# Patient Record
Sex: FEMALE | Race: WHITE | NOT HISPANIC OR LATINO | Employment: OTHER | URBAN - METROPOLITAN AREA
[De-identification: names, ages, dates, MRNs, and addresses within clinical notes are randomized per-mention and may not be internally consistent; named-entity substitution may affect disease eponyms.]

---

## 2023-03-09 DIAGNOSIS — E03.9 HYPOTHYROIDISM, UNSPECIFIED TYPE: Primary | ICD-10-CM

## 2023-03-09 DIAGNOSIS — I10 PRIMARY HYPERTENSION: ICD-10-CM

## 2023-03-09 DIAGNOSIS — K21.9 GASTROESOPHAGEAL REFLUX DISEASE WITHOUT ESOPHAGITIS: ICD-10-CM

## 2023-03-10 RX ORDER — LISINOPRIL 20 MG/1
TABLET ORAL
Qty: 90 TABLET | Refills: 1 | Status: SHIPPED | OUTPATIENT
Start: 2023-03-10 | End: 2023-03-13 | Stop reason: SDUPTHER

## 2023-03-10 RX ORDER — LEVOTHYROXINE SODIUM 100 UG/1
TABLET ORAL
Qty: 90 TABLET | Refills: 1 | Status: SHIPPED | OUTPATIENT
Start: 2023-03-10 | End: 2023-03-13 | Stop reason: SDUPTHER

## 2023-03-10 RX ORDER — PANTOPRAZOLE SODIUM 40 MG/1
40 TABLET, DELAYED RELEASE ORAL
Qty: 90 TABLET | Refills: 1 | Status: SHIPPED | OUTPATIENT
Start: 2023-03-10 | End: 2023-03-13 | Stop reason: SDUPTHER

## 2023-03-14 RX ORDER — LISINOPRIL 20 MG/1
20 TABLET ORAL DAILY
Qty: 90 TABLET | Refills: 1 | Status: SHIPPED | OUTPATIENT
Start: 2023-03-14 | End: 2024-02-26 | Stop reason: SDUPTHER

## 2023-03-14 RX ORDER — PANTOPRAZOLE SODIUM 40 MG/1
40 TABLET, DELAYED RELEASE ORAL
Qty: 90 TABLET | Refills: 1 | Status: SHIPPED | OUTPATIENT
Start: 2023-03-14 | End: 2023-03-16 | Stop reason: SDUPTHER

## 2023-03-14 RX ORDER — LEVOTHYROXINE SODIUM 100 UG/1
100 TABLET ORAL DAILY
Qty: 90 TABLET | Refills: 1 | Status: SHIPPED | OUTPATIENT
Start: 2023-03-14 | End: 2023-10-06 | Stop reason: SDUPTHER

## 2023-03-16 DIAGNOSIS — K21.9 GASTROESOPHAGEAL REFLUX DISEASE WITHOUT ESOPHAGITIS: ICD-10-CM

## 2023-03-17 RX ORDER — PANTOPRAZOLE SODIUM 40 MG/1
40 TABLET, DELAYED RELEASE ORAL 2 TIMES DAILY
Qty: 180 TABLET | Refills: 1 | Status: SHIPPED | OUTPATIENT
Start: 2023-03-17 | End: 2023-11-29

## 2023-04-23 PROBLEM — R39.9 UTI SYMPTOMS: Status: ACTIVE | Noted: 2023-04-23

## 2023-04-23 PROBLEM — K14.8 TONGUE MASS: Status: ACTIVE | Noted: 2023-04-23

## 2023-04-23 PROBLEM — L03.032 PARONYCHIA OF TOENAIL OF LEFT FOOT: Status: ACTIVE | Noted: 2023-04-23

## 2023-04-23 PROBLEM — F32.A DEPRESSION: Status: ACTIVE | Noted: 2023-04-23

## 2023-04-23 PROBLEM — B18.2 CHRONIC VIRAL HEPATITIS C (MULTI): Status: ACTIVE | Noted: 2023-04-23

## 2023-04-23 PROBLEM — I10 HYPERTENSION: Status: ACTIVE | Noted: 2023-04-23

## 2023-04-23 PROBLEM — R29.3 POSTURE IMBALANCE: Status: ACTIVE | Noted: 2023-04-23

## 2023-04-23 PROBLEM — R13.10 DYSPHAGIA: Status: ACTIVE | Noted: 2023-04-23

## 2023-04-23 PROBLEM — M25.569 JOINT PAIN, KNEE: Status: ACTIVE | Noted: 2023-04-23

## 2023-04-23 PROBLEM — M17.11 ARTHRITIS OF RIGHT KNEE: Status: ACTIVE | Noted: 2023-04-23

## 2023-04-23 PROBLEM — R29.898 WEAKNESS OF BOTH LOWER EXTREMITIES: Status: ACTIVE | Noted: 2023-04-23

## 2023-04-23 PROBLEM — I67.1 BRAIN ANEURYSM (HHS-HCC): Status: ACTIVE | Noted: 2023-04-23

## 2023-04-23 PROBLEM — F10.20 ALCOHOLIC (MULTI): Status: ACTIVE | Noted: 2023-04-23

## 2023-04-23 PROBLEM — Z86.39 HISTORY OF ENLARGEMENT OF PITUITARY GLAND: Status: ACTIVE | Noted: 2023-04-23

## 2023-04-23 PROBLEM — E03.9 HYPOTHYROIDISM: Status: ACTIVE | Noted: 2023-04-23

## 2023-04-23 PROBLEM — A08.4 VIRAL GASTROENTERITIS: Status: ACTIVE | Noted: 2023-04-23

## 2023-04-23 PROBLEM — R29.898 NECK TIGHTNESS: Status: ACTIVE | Noted: 2023-04-23

## 2023-04-23 PROBLEM — M50.10 CERVICAL DISC DISORDER WITH RADICULOPATHY: Status: ACTIVE | Noted: 2023-04-23

## 2023-04-23 PROBLEM — M65.30 TRIGGER FINGER: Status: ACTIVE | Noted: 2023-04-23

## 2023-04-23 PROBLEM — H69.93 EUSTACHIAN TUBE DYSFUNCTION, BILATERAL: Status: ACTIVE | Noted: 2023-04-23

## 2023-04-23 PROBLEM — G45.9 TIA (TRANSIENT ISCHEMIC ATTACK): Status: ACTIVE | Noted: 2023-04-23

## 2023-04-23 PROBLEM — R79.89 ELEVATED LFTS: Status: ACTIVE | Noted: 2023-04-23

## 2023-04-23 PROBLEM — E78.5 HYPERLIPIDEMIA: Status: ACTIVE | Noted: 2023-04-23

## 2023-04-23 PROBLEM — K74.60 CIRRHOSIS (MULTI): Status: ACTIVE | Noted: 2023-04-23

## 2023-04-23 PROBLEM — L25.9 CONTACT DERMATITIS: Status: ACTIVE | Noted: 2023-04-23

## 2023-04-23 PROBLEM — T78.40XA ALLERGY: Status: ACTIVE | Noted: 2023-04-23

## 2023-04-23 PROBLEM — B37.31 VAGINAL CANDIDIASIS: Status: ACTIVE | Noted: 2023-04-23

## 2023-04-23 PROBLEM — E83.42 LOW SERUM MAGNESIUM LEVEL: Status: ACTIVE | Noted: 2023-04-23

## 2023-04-23 PROBLEM — M53.82 WEAKNESS OF NECK: Status: ACTIVE | Noted: 2023-04-23

## 2023-04-23 PROBLEM — J01.90 ACUTE SINUSITIS: Status: ACTIVE | Noted: 2023-04-23

## 2023-04-23 PROBLEM — D22.9 SUSPICIOUS NEVUS: Status: ACTIVE | Noted: 2023-04-23

## 2023-04-23 PROBLEM — Z86.19 HEPATITIS C VIRUS INFECTION CURED AFTER ANTIVIRAL DRUG THERAPY: Status: ACTIVE | Noted: 2023-04-23

## 2023-04-23 PROBLEM — H26.9 CATARACT: Status: ACTIVE | Noted: 2023-04-23

## 2023-04-23 PROBLEM — H44.519 ABSOLUTE GLAUCOMA: Status: ACTIVE | Noted: 2023-04-23

## 2023-04-23 PROBLEM — B37.0 THRUSH: Status: ACTIVE | Noted: 2023-04-23

## 2023-04-23 PROBLEM — E55.9 VITAMIN D DEFICIENCY: Status: ACTIVE | Noted: 2023-04-23

## 2023-04-23 PROBLEM — M54.2 NECK PAIN: Status: ACTIVE | Noted: 2023-04-23

## 2023-04-23 PROBLEM — K74.00 LIVER FIBROSIS: Status: ACTIVE | Noted: 2023-04-23

## 2023-04-23 PROBLEM — M53.3 PAIN, COCCYX: Status: ACTIVE | Noted: 2023-04-23

## 2023-04-23 PROBLEM — R55 SYNCOPE AND COLLAPSE: Status: ACTIVE | Noted: 2023-04-23

## 2023-04-23 PROBLEM — K14.6 TONGUE PAIN: Status: ACTIVE | Noted: 2023-04-23

## 2023-04-23 PROBLEM — N39.0 ACUTE UTI: Status: ACTIVE | Noted: 2023-04-23

## 2023-04-23 PROBLEM — M26.649 TMJ ARTHRITIS: Status: ACTIVE | Noted: 2023-04-23

## 2023-04-23 PROBLEM — H69.91 DYSFUNCTION OF RIGHT EUSTACHIAN TUBE: Status: ACTIVE | Noted: 2023-04-23

## 2023-04-23 PROBLEM — K59.09 CONSTIPATION, CHRONIC: Status: ACTIVE | Noted: 2023-04-23

## 2023-04-23 PROBLEM — B02.9 SHINGLES: Status: ACTIVE | Noted: 2023-04-23

## 2023-04-23 PROBLEM — K13.21 ORAL LEUKOPLAKIA: Status: ACTIVE | Noted: 2023-04-23

## 2023-04-23 PROBLEM — S41.109A: Status: ACTIVE | Noted: 2023-04-23

## 2023-04-23 PROBLEM — E23.6 PITUITARY CYST (MULTI): Status: ACTIVE | Noted: 2023-04-23

## 2023-04-23 PROBLEM — R73.01 IMPAIRED FASTING GLUCOSE: Status: ACTIVE | Noted: 2023-04-23

## 2023-04-23 PROBLEM — E87.1 HYPONATREMIA: Status: ACTIVE | Noted: 2023-04-23

## 2023-04-23 PROBLEM — R68.3 CLUBBING OF FINGERS: Status: ACTIVE | Noted: 2023-04-23

## 2023-04-23 PROBLEM — I88.9 LYMPHADENITIS: Status: ACTIVE | Noted: 2023-04-23

## 2023-04-23 PROBLEM — J02.9 SORE THROAT: Status: ACTIVE | Noted: 2023-04-23

## 2023-04-23 PROBLEM — R76.8 ANTI-TPO ANTIBODIES PRESENT: Status: ACTIVE | Noted: 2023-04-23

## 2023-04-23 RX ORDER — IBUPROFEN 600 MG/1
600 TABLET ORAL EVERY 6 HOURS
COMMUNITY
Start: 2023-02-11 | End: 2023-11-02 | Stop reason: ALTCHOICE

## 2023-04-23 RX ORDER — OLOPATADINE HYDROCHLORIDE 2 MG/ML
SOLUTION/ DROPS OPHTHALMIC
COMMUNITY
Start: 2021-11-11

## 2023-04-23 RX ORDER — TIZANIDINE 2 MG/1
TABLET ORAL
COMMUNITY
Start: 2023-01-02 | End: 2023-11-02 | Stop reason: ALTCHOICE

## 2023-04-23 RX ORDER — METHOCARBAMOL 750 MG/1
1 TABLET, FILM COATED ORAL
COMMUNITY
Start: 2023-02-11 | End: 2023-11-02 | Stop reason: ALTCHOICE

## 2023-04-23 RX ORDER — ASPIRIN 81 MG/1
1 TABLET ORAL DAILY
COMMUNITY
Start: 2018-06-26

## 2023-04-23 RX ORDER — ACETAMINOPHEN 500 MG
500 TABLET ORAL EVERY 6 HOURS
COMMUNITY
Start: 2023-02-11

## 2023-04-23 RX ORDER — SIMVASTATIN 10 MG/1
10 TABLET, FILM COATED ORAL NIGHTLY
COMMUNITY
Start: 2023-01-20 | End: 2023-04-24 | Stop reason: ALTCHOICE

## 2023-04-23 RX ORDER — FOLIC ACID 1 MG/1
1 TABLET ORAL DAILY
COMMUNITY

## 2023-04-23 RX ORDER — CALCIUM CARBONATE 600 MG
1200 TABLET ORAL
COMMUNITY

## 2023-04-23 RX ORDER — VIT C/E/ZN/COPPR/LUTEIN/ZEAXAN 250MG-90MG
50 CAPSULE ORAL
COMMUNITY
Start: 2021-12-23

## 2023-04-23 RX ORDER — ATORVASTATIN CALCIUM 40 MG/1
1 TABLET, FILM COATED ORAL NIGHTLY
COMMUNITY
Start: 2023-01-23 | End: 2023-07-17 | Stop reason: SDUPTHER

## 2023-04-23 RX ORDER — CLOPIDOGREL BISULFATE 75 MG/1
1 TABLET ORAL DAILY
COMMUNITY
Start: 2023-01-13 | End: 2024-03-20 | Stop reason: ALTCHOICE

## 2023-04-23 RX ORDER — GLUCOSAMINE/MSM/CHONDROIT SULF 500-166.6
1 TABLET ORAL DAILY
COMMUNITY
Start: 2021-11-11

## 2023-04-23 RX ORDER — VITAMIN B COMPLEX
1 CAPSULE ORAL DAILY
COMMUNITY
Start: 2021-11-11

## 2023-04-24 ENCOUNTER — OFFICE VISIT (OUTPATIENT)
Dept: PRIMARY CARE | Facility: CLINIC | Age: 68
End: 2023-04-24
Payer: MEDICARE

## 2023-04-24 VITALS
SYSTOLIC BLOOD PRESSURE: 137 MMHG | DIASTOLIC BLOOD PRESSURE: 67 MMHG | HEART RATE: 73 BPM | WEIGHT: 149 LBS | BODY MASS INDEX: 26.4 KG/M2 | HEIGHT: 63 IN

## 2023-04-24 DIAGNOSIS — E78.5 HYPERLIPIDEMIA, UNSPECIFIED HYPERLIPIDEMIA TYPE: Primary | ICD-10-CM

## 2023-04-24 DIAGNOSIS — Z12.31 ENCOUNTER FOR SCREENING MAMMOGRAM FOR MALIGNANT NEOPLASM OF BREAST: ICD-10-CM

## 2023-04-24 DIAGNOSIS — E03.9 HYPOTHYROIDISM, UNSPECIFIED TYPE: ICD-10-CM

## 2023-04-24 DIAGNOSIS — G45.9 TIA (TRANSIENT ISCHEMIC ATTACK): ICD-10-CM

## 2023-04-24 DIAGNOSIS — F17.210 CIGARETTE NICOTINE DEPENDENCE WITHOUT COMPLICATION: ICD-10-CM

## 2023-04-24 DIAGNOSIS — F10.20 ALCOHOLIC (MULTI): ICD-10-CM

## 2023-04-24 PROCEDURE — 1159F MED LIST DOCD IN RCRD: CPT | Performed by: INTERNAL MEDICINE

## 2023-04-24 PROCEDURE — 1160F RVW MEDS BY RX/DR IN RCRD: CPT | Performed by: INTERNAL MEDICINE

## 2023-04-24 PROCEDURE — 3075F SYST BP GE 130 - 139MM HG: CPT | Performed by: INTERNAL MEDICINE

## 2023-04-24 PROCEDURE — 99214 OFFICE O/P EST MOD 30 MIN: CPT | Performed by: INTERNAL MEDICINE

## 2023-04-24 PROCEDURE — 4004F PT TOBACCO SCREEN RCVD TLK: CPT | Performed by: INTERNAL MEDICINE

## 2023-04-24 PROCEDURE — 3078F DIAST BP <80 MM HG: CPT | Performed by: INTERNAL MEDICINE

## 2023-04-24 ASSESSMENT — PATIENT HEALTH QUESTIONNAIRE - PHQ9
1. LITTLE INTEREST OR PLEASURE IN DOING THINGS: NOT AT ALL
SUM OF ALL RESPONSES TO PHQ9 QUESTIONS 1 AND 2: 0
2. FEELING DOWN, DEPRESSED OR HOPELESS: NOT AT ALL

## 2023-04-24 NOTE — PATIENT INSTRUCTIONS
Mamm and CT Chest [phone]     Please complete fasting blood work. Fast for 10 hours, black coffee and water the morning of labs are OK.     Think about getting shingle vaccine     Come back in 6 months

## 2023-04-24 NOTE — PROGRESS NOTES
"Subjective   Patient ID: Luz Castillo is a 67 y.o. female who presents for 6 month follow up.    HPI     Following with Dr. Issa for cervical radiculopathy. Had recent MRI, will discuss results with Dr. Issa    Following with Dr. Logan Gee for low back pain with LLE radiculopathy, started on prednisone last week which did help with her back pain.     Noticed a mass on her right thumb about 2 months ago. Not getting bigger. Follows with Dr. NOEL Singh for trigger finger     Reports b/l LE edema cramps started a few months ago. Uses mustard and tonic water that does help.      Review of Systems   All other systems reviewed and are negative.      Objective   /67   Pulse 73   Ht 1.6 m (5' 3\")   Wt 89.4 kg (197 lb)   BMI 34.90 kg/m²     Physical Exam  Constitutional:       Appearance: Normal appearance.   HENT:      Head: Normocephalic and atraumatic.   Cardiovascular:      Rate and Rhythm: Normal rate and regular rhythm.      Heart sounds: Normal heart sounds. No murmur heard.     No gallop.   Pulmonary:      Effort: Pulmonary effort is normal. No respiratory distress.      Breath sounds: No wheezing or rales.   Skin:     General: Skin is warm and dry.      Findings: No rash.   Neurological:      Mental Status: She is alert and oriented to person, place, and time. Mental status is at baseline.   Psychiatric:         Mood and Affect: Mood normal.         Behavior: Behavior normal.         Assessment/Plan       #HLD, TIA   -No new neuro symptoms. Cont ASA and clopidogrel  -Cont atorvastatin 40mg daily      #Cervical radiculopathy and low back pain   -Follows with Dr. Issa and Dr. Logan Gee     #Tongue lesion  -bx benign, close f/u with Dr. Gallardo      #Hypothyroidism, multinodular goiter   -Continue levothyroxine, check TSH      #Hepatitis C,  -s/p Mavyret, Following with hepatology      #HTN  -Well controlled, cont lisinopril      #GERD  -cont PPI      #Polysubstance abuse, in remission   -Cont 12 step " meetings and outpatient treatment        Influenza: Declines   COVID: x2  Prevnar 13: Never   Pneumovax 23: 9/20/21  Shingrix: Never  Mamm: 2/7/22--order today   DEXA: 3/24/22  Pap: Never  Colorectal ca: 10/5/22- 5 years   Lung ca screening: never--ordered      RTC 6 mo

## 2023-04-26 ENCOUNTER — LAB (OUTPATIENT)
Dept: LAB | Facility: LAB | Age: 68
End: 2023-04-26
Payer: MEDICARE

## 2023-04-26 DIAGNOSIS — G45.9 TIA (TRANSIENT ISCHEMIC ATTACK): ICD-10-CM

## 2023-04-26 DIAGNOSIS — E03.9 HYPOTHYROIDISM, UNSPECIFIED TYPE: ICD-10-CM

## 2023-04-26 DIAGNOSIS — E78.5 HYPERLIPIDEMIA, UNSPECIFIED HYPERLIPIDEMIA TYPE: ICD-10-CM

## 2023-04-26 LAB
CHOLESTEROL (MG/DL) IN SER/PLAS: 150 MG/DL (ref 0–199)
CHOLESTEROL IN HDL (MG/DL) IN SER/PLAS: 66.3 MG/DL
CHOLESTEROL/HDL RATIO: 2.3
LDL: 67 MG/DL (ref 0–99)
THYROTROPIN (MIU/L) IN SER/PLAS BY DETECTION LIMIT <= 0.05 MIU/L: 2.56 MIU/L (ref 0.44–3.98)
TRIGLYCERIDE (MG/DL) IN SER/PLAS: 82 MG/DL (ref 0–149)
VLDL: 16 MG/DL (ref 0–40)

## 2023-04-26 PROCEDURE — 80061 LIPID PANEL: CPT

## 2023-04-26 PROCEDURE — 84443 ASSAY THYROID STIM HORMONE: CPT

## 2023-04-26 PROCEDURE — 36415 COLL VENOUS BLD VENIPUNCTURE: CPT

## 2023-07-17 DIAGNOSIS — E78.5 HYPERLIPIDEMIA, UNSPECIFIED HYPERLIPIDEMIA TYPE: Primary | ICD-10-CM

## 2023-07-19 RX ORDER — ATORVASTATIN CALCIUM 40 MG/1
40 TABLET, FILM COATED ORAL NIGHTLY
Qty: 90 TABLET | Refills: 0 | Status: SHIPPED | OUTPATIENT
Start: 2023-07-19 | End: 2023-10-16 | Stop reason: SDUPTHER

## 2023-09-22 RX ORDER — HYDROCODONE BITARTRATE AND ACETAMINOPHEN 5; 325 MG/1; MG/1
1 TABLET ORAL EVERY 6 HOURS PRN
COMMUNITY
End: 2023-11-02 | Stop reason: ALTCHOICE

## 2023-09-22 RX ORDER — METHYLPREDNISOLONE 4 MG/1
TABLET ORAL
COMMUNITY
Start: 2023-04-19 | End: 2023-11-02 | Stop reason: ALTCHOICE

## 2023-09-22 RX ORDER — MAGNESIUM GLYCINATE 100 MG
CAPSULE ORAL
COMMUNITY
Start: 2023-08-08

## 2023-09-22 RX ORDER — AMOXICILLIN 500 MG/1
CAPSULE ORAL
COMMUNITY
Start: 2022-12-29 | End: 2024-03-21 | Stop reason: SDUPTHER

## 2023-09-22 RX ORDER — CYCLOBENZAPRINE HCL 10 MG
10 TABLET ORAL EVERY 8 HOURS PRN
COMMUNITY
Start: 2017-03-25 | End: 2023-11-02 | Stop reason: ALTCHOICE

## 2023-09-22 RX ORDER — OXYCODONE AND ACETAMINOPHEN 5; 325 MG/1; MG/1
1 TABLET ORAL EVERY 4 HOURS PRN
COMMUNITY
Start: 2017-03-25 | End: 2023-11-02 | Stop reason: ALTCHOICE

## 2023-10-02 ENCOUNTER — ANCILLARY PROCEDURE (OUTPATIENT)
Dept: RADIOLOGY | Facility: CLINIC | Age: 68
End: 2023-10-02
Payer: MEDICARE

## 2023-10-02 ENCOUNTER — HOSPITAL ENCOUNTER (OUTPATIENT)
Dept: GASTROENTEROLOGY | Facility: CLINIC | Age: 68
Discharge: HOME | End: 2023-10-02
Payer: MEDICARE

## 2023-10-02 ENCOUNTER — LAB (OUTPATIENT)
Dept: LAB | Facility: LAB | Age: 68
End: 2023-10-02
Payer: MEDICARE

## 2023-10-02 DIAGNOSIS — K74.60 HEPATIC CIRRHOSIS, UNSPECIFIED HEPATIC CIRRHOSIS TYPE, UNSPECIFIED WHETHER ASCITES PRESENT (MULTI): ICD-10-CM

## 2023-10-02 DIAGNOSIS — Z11.59 ENCOUNTER FOR SCREENING FOR OTHER VIRAL DISEASES: ICD-10-CM

## 2023-10-02 DIAGNOSIS — K74.00 LIVER FIBROSIS: ICD-10-CM

## 2023-10-02 DIAGNOSIS — K74.00 HEPATIC FIBROSIS, UNSPECIFIED: ICD-10-CM

## 2023-10-02 DIAGNOSIS — K74.60 UNSPECIFIED CIRRHOSIS OF LIVER (MULTI): ICD-10-CM

## 2023-10-02 DIAGNOSIS — B18.2 CHRONIC VIRAL HEPATITIS C (MULTI): ICD-10-CM

## 2023-10-02 DIAGNOSIS — B18.2 CHRONIC HEPATITIS C WITH HEPATIC COMA (MULTI): ICD-10-CM

## 2023-10-02 DIAGNOSIS — B18.2 CHRONIC VIRAL HEPATITIS C (MULTI): Primary | ICD-10-CM

## 2023-10-02 LAB
ALBUMIN SERPL BCP-MCNC: 4.5 G/DL (ref 3.4–5)
ALP SERPL-CCNC: 47 U/L (ref 33–136)
ALT SERPL W P-5'-P-CCNC: 17 U/L (ref 7–45)
AST SERPL W P-5'-P-CCNC: 15 U/L (ref 9–39)
BILIRUB DIRECT SERPL-MCNC: 0.1 MG/DL (ref 0–0.3)
BILIRUB SERPL-MCNC: 0.3 MG/DL (ref 0–1.2)
HBV SURFACE AB SER-ACNC: 5.5 MIU/ML
PROT SERPL-MCNC: 7.1 G/DL (ref 6.4–8.2)

## 2023-10-02 PROCEDURE — 76705 ECHO EXAM OF ABDOMEN: CPT | Performed by: RADIOLOGY

## 2023-10-02 PROCEDURE — 91200 LIVER ELASTOGRAPHY: CPT | Performed by: INTERNAL MEDICINE

## 2023-10-02 PROCEDURE — 36415 COLL VENOUS BLD VENIPUNCTURE: CPT

## 2023-10-02 PROCEDURE — 80076 HEPATIC FUNCTION PANEL: CPT

## 2023-10-02 PROCEDURE — 76705 ECHO EXAM OF ABDOMEN: CPT

## 2023-10-02 PROCEDURE — 86706 HEP B SURFACE ANTIBODY: CPT

## 2023-10-02 PROCEDURE — 87522 HEPATITIS C REVRS TRNSCRPJ: CPT

## 2023-10-03 LAB
HCV RNA SERPL NAA+PROBE-ACNC: NOT DETECTED K[IU]/ML
HCV RNA SERPL NAA+PROBE-LOG IU: NORMAL {LOG_IU}/ML

## 2023-10-04 NOTE — PROCEDURES
1045)  Patient referred for Fibroscan study for diagnosis of Chronic Viral Hepatitis              Patient identified x 2 and verified the following:                 Has been NPO for at least 3 hours.                 Age 18 or older - yes                 If female, not pregnant - yes                                Fibroscan study completed using XL probe and 10 consecutive valid measurements obtained.             Patient tolerated procedure well.               RESULTS:    Median   =    4.0 kPa                                       IQR/med =  11  %                                 CAP       =  269   dB/m                Patient informed that Dr. AMRIK Isabel will read the study and contact her with the results.                   Chata Hernandez RN-BC    Attending Note:  This was a technically adequate study. The Fibrosis score is consistent with Metavir F0 . The CAP score is consistent with 5-33% hepatocyte steatosis (stage 1 of 3 steatosis).    Tristan Isabel MD   Hepatology

## 2023-10-06 DIAGNOSIS — E03.9 HYPOTHYROIDISM, UNSPECIFIED TYPE: ICD-10-CM

## 2023-10-06 NOTE — TELEPHONE ENCOUNTER
Requested Prescriptions     Pending Prescriptions Disp Refills    levothyroxine (Synthroid, Levoxyl) 100 mcg tablet 90 tablet 1     Sig: Take 1 tablet (100 mcg) by mouth once daily.

## 2023-10-09 RX ORDER — LEVOTHYROXINE SODIUM 100 UG/1
100 TABLET ORAL DAILY
Qty: 90 TABLET | Refills: 1 | Status: SHIPPED | OUTPATIENT
Start: 2023-10-09 | End: 2024-03-20 | Stop reason: SDUPTHER

## 2023-10-16 ENCOUNTER — TELEPHONE (OUTPATIENT)
Dept: PRIMARY CARE | Facility: CLINIC | Age: 68
End: 2023-10-16
Payer: MEDICARE

## 2023-10-16 DIAGNOSIS — E78.5 HYPERLIPIDEMIA, UNSPECIFIED HYPERLIPIDEMIA TYPE: ICD-10-CM

## 2023-10-16 RX ORDER — ATORVASTATIN CALCIUM 40 MG/1
40 TABLET, FILM COATED ORAL NIGHTLY
Qty: 90 TABLET | Refills: 1 | Status: SHIPPED | OUTPATIENT
Start: 2023-10-16 | End: 2024-04-17 | Stop reason: SDUPTHER

## 2023-10-16 NOTE — TELEPHONE ENCOUNTER
Requested Prescriptions     Pending Prescriptions Disp Refills    atorvastatin (Lipitor) 40 mg tablet 90 tablet 1     Sig: Take 1 tablet (40 mg) by mouth once daily at bedtime.

## 2023-10-25 ENCOUNTER — APPOINTMENT (OUTPATIENT)
Dept: PRIMARY CARE | Facility: CLINIC | Age: 68
End: 2023-10-25
Payer: MEDICARE

## 2023-10-26 ENCOUNTER — OFFICE VISIT (OUTPATIENT)
Dept: GASTROENTEROLOGY | Facility: CLINIC | Age: 68
End: 2023-10-26
Payer: MEDICARE

## 2023-10-26 VITALS
BODY MASS INDEX: 26.19 KG/M2 | DIASTOLIC BLOOD PRESSURE: 66 MMHG | SYSTOLIC BLOOD PRESSURE: 107 MMHG | HEIGHT: 63 IN | HEART RATE: 76 BPM | WEIGHT: 147.8 LBS | TEMPERATURE: 97.3 F

## 2023-10-26 DIAGNOSIS — Z71.6 ENCOUNTER FOR SMOKING CESSATION COUNSELING: ICD-10-CM

## 2023-10-26 DIAGNOSIS — K74.00 LIVER FIBROSIS: ICD-10-CM

## 2023-10-26 DIAGNOSIS — Z86.19 HEPATITIS C VIRUS INFECTION CURED AFTER ANTIVIRAL DRUG THERAPY: Primary | ICD-10-CM

## 2023-10-26 DIAGNOSIS — Z71.2 ENCOUNTER TO DISCUSS TEST RESULTS: ICD-10-CM

## 2023-10-26 PROCEDURE — 99406 BEHAV CHNG SMOKING 3-10 MIN: CPT | Performed by: INTERNAL MEDICINE

## 2023-10-26 PROCEDURE — 99214 OFFICE O/P EST MOD 30 MIN: CPT | Performed by: INTERNAL MEDICINE

## 2023-10-26 PROCEDURE — 1126F AMNT PAIN NOTED NONE PRSNT: CPT | Performed by: INTERNAL MEDICINE

## 2023-10-26 PROCEDURE — 1160F RVW MEDS BY RX/DR IN RCRD: CPT | Performed by: INTERNAL MEDICINE

## 2023-10-26 PROCEDURE — 4004F PT TOBACCO SCREEN RCVD TLK: CPT | Performed by: INTERNAL MEDICINE

## 2023-10-26 PROCEDURE — 1159F MED LIST DOCD IN RCRD: CPT | Performed by: INTERNAL MEDICINE

## 2023-10-26 PROCEDURE — 3078F DIAST BP <80 MM HG: CPT | Performed by: INTERNAL MEDICINE

## 2023-10-26 PROCEDURE — 3074F SYST BP LT 130 MM HG: CPT | Performed by: INTERNAL MEDICINE

## 2023-10-26 ASSESSMENT — PAIN SCALES - GENERAL: PAINLEVEL: 0-NO PAIN

## 2023-10-26 NOTE — PATIENT INSTRUCTIONS
"Welcome to Dr. [facility].  Dr. Isabel sees patients at the following sites:  [place] [address] [facility] at [facility], [address] at [facility], [facility] [address]    Dr. Isabel's hepatology care coordinator, Pamela EDMONDS, can be reached at [phone].  Dr. Isabel's , Ashley Ovalles, can be reached at [phone].     Get a Liver Ultrasound in 1 year.  Do not eat or drink anything 6 hours prior to your exam.  Call [phone] to schedule.    Get a FibroSCAN of your liver in 1 year.   Do not eat or drink anything 3 hours prior to your exam.  Schedule on your way out today, or call [phone]. When prompted, stated \"make an appointment\" and then \"gastro.\"     Get blood work in 1 year.     See me back in clinic in 1 year. Please make sure all testing is completed prior to your follow up visit.  Please call around April 2024 to schedule your follow up visit.  "

## 2023-10-26 NOTE — PROGRESS NOTES
Subjective     Reason for visit: follow up for chronic HCV infection and advanced fibrosis.    History of Present Illness:   Luz Castillo is a 68 y.o. female who presents to GI/Liver clinic for follow up of chronic Hepatitis C infection, advanced fibrosis.    Pt is a 68/F with PMH of chronic HCV infection, alcohol dependence in remission x > 2 years, hypothyroidism, HLD, HTN, cocaine dependence in remission who is here for follow up of chronic Hep C.     Pt got infected with Hep C in 90s from IV drug use.   She had GT 1a, had VL 2.48 million IU/mL,   Completed Mavyret x 8 weeks in March 2022 without missed doses, achieved SVR, neg HCV at 3 month and 6 months.     Baseline F3 fibrosis (10.6 kPa) on Fibroscan before treatment which improved to F0-1 following SVR in Sept 2022.  Repeat Fibroscan (10/2/2023): LSM 4.0 /     Pt has been sober from alcohol and quit cocaine since March 2021.   Smokes a pack of cigarettes a day, ~5 pack years in the last 5 years since care home.   Prior to that, she smoked 0.5 pack for 45 years.  Continue to smoke cigarettes.    Today, the pt denies any GI symptoms.     Labs:  AST and ALT elevation ~ 50s in 2021, normalized following treatment. Normal bilirubin, INR and platelets    Imaging:  Fibroscan S0, F0-1 3.7 kPa in Sept 2022  US liver Oct 2022: normal liver, no ascites  Proc  EGD 2018:   Erythematous duodenopathy, biopsy normal  Gastritis, no H pylori  Normal appearing esophagus for dysphagia, neg biopsy     Colonoscopy Oct 2022 (with Dr. Mejía): 10 mm SSA, 5 year follow up.    Continue to smoke, has discussed with me and her other physicians (Dr Driscoll, Cardiology)  2 + years of sobriety from alcohol use. Will be 3 years in March 2024.    Review of Systems  Review of Systems   Constitutional: Negative.    HENT: Negative.     Eyes: Negative.    Cardiovascular: Negative.    Gastrointestinal: Negative.    Endocrine: Negative.    Genitourinary: Negative.    All other  systems reviewed and are negative.      Past Medical History   has a past medical history of Alcohol dependence, in remission (CMS/HCC) (12/26/2019), Alcohol dependence, in remission (CMS/HCC) (09/20/2021), Anxiety disorder, unspecified, Chronic viral hepatitis C (CMS/HCC) (11/29/2021), Other long term (current) drug therapy (05/06/2021), Personal history of diseases of the blood and blood-forming organs and certain disorders involving the immune mechanism, Personal history of other diseases of the circulatory system, Personal history of other diseases of the digestive system (09/20/2021), Personal history of other diseases of the female genital tract, Personal history of other endocrine, nutritional and metabolic disease (03/10/2021), and Personal history of other infectious and parasitic diseases (03/17/2022).     Social History   reports that she has been smoking cigarettes. She has been smoking an average of 1 pack per day. She has never used smokeless tobacco. She reports that she does not drink alcohol and does not use drugs.     Family History  family history is not on file.     Allergies  Allergies   Allergen Reactions    Atorvastatin Hives and Unknown    Celecoxib Unknown     Comments: heart races    Diphenhydramine Unknown    Iodine Itching and Swelling    Pramipexole Unknown    Rofecoxib Other     tacjycardia       Medications  Current Outpatient Medications   Medication Instructions    acetaminophen (TYLENOL) 500 mg, oral, Every 6 hours    amoxicillin (Amoxil) 500 mg capsule oral    aspirin 81 mg EC tablet 1 tablet, oral, Daily    atorvastatin (LIPITOR) 40 mg, oral, Nightly    b complex vitamins capsule 1 capsule, oral, Daily    calcium carbonate 600 mg calcium (1,500 mg) tablet oral    cholecalciferol (VITAMIN D-3) 50 mcg, oral    clopidogrel (Plavix) 75 mg tablet 1 tablet, oral, Daily    cyclobenzaprine (FLEXERIL) 10 mg, oral, Every 8 hours PRN    folic acid (Folvite) 1 mg tablet 1 tablet, oral, Daily     HYDROcodone-acetaminophen (Norco) 5-325 mg tablet 1 tablet, oral, Every 6 hours PRN     mg, oral, Every 6 hours    levothyroxine (SYNTHROID, LEVOXYL) 100 mcg, oral, Daily    lisinopril 20 mg, oral, Daily    magnesium glycinate 100 mg magnesium capsule oral    methocarbamol (Robaxin) 750 mg tablet 1 tablet, oral, Every 6 hours during day    methylPREDNISolone (Medrol Dospak) 4 mg tablets TAKE AS DIRECTED PER PACKAGE    multivit-min/ferrous gluconate (CENTAMIN ORAL) oral    olopatadine (Pataday) 0.2 % ophthalmic solution ophthalmic (eye), Daily RT    omega 3-dha-epa-fish oil 360 mg-108 mg- 180 mg-1,200 mg capsule 1 capsule, oral, Daily    oxyCODONE-acetaminophen (Percocet) 5-325 mg tablet 1 tablet, oral, Every 4 hours PRN    pantoprazole (PROTONIX) 40 mg, oral, 2 times daily, take before breakfast and dinner    tiZANidine (Zanaflex) 2 mg tablet oral        Objective   Visit Vitals  /66   Pulse 76   Temp 36.3 °C (97.3 °F) (Temporal)      Physical Exam  Vitals and nursing note reviewed.   Constitutional:       Appearance: Normal appearance.   HENT:      Head: Normocephalic and atraumatic.      Mouth/Throat:      Mouth: Mucous membranes are moist.      Pharynx: Oropharynx is clear.   Eyes:      General: No scleral icterus.     Extraocular Movements: Extraocular movements intact.      Pupils: Pupils are equal, round, and reactive to light.   Cardiovascular:      Rate and Rhythm: Normal rate and regular rhythm.      Heart sounds: No murmur heard.  Pulmonary:      Effort: Pulmonary effort is normal.      Breath sounds: Normal breath sounds.   Abdominal:      General: Abdomen is flat. Bowel sounds are normal.      Palpations: Abdomen is soft.   Musculoskeletal:         General: No swelling. Normal range of motion.      Right lower leg: No edema.      Left lower leg: No edema.   Skin:     Coloration: Skin is not jaundiced.   Neurological:      General: No focal deficit present.      Mental Status: She is alert  "and oriented to person, place, and time. Mental status is at baseline.   Psychiatric:         Mood and Affect: Mood normal.         Thought Content: Thought content normal.         Judgment: Judgment normal.     Labs            No lab exists for component: \"LABALBU\"        Fibroscan 10/2/23             RESULTS:    Median   =    4.0 kPa                                       IQR/med =  11  %                                 CAP       =  269   dB/m    Liver US 10/2/23  FINDINGS:   LIVER:   The liver is homogeneous in echotexture. No focal hepatic lesion is   identified.  Liver measures 13.2 cm in sagittal dimension.       GALLBLADDER:   Gallbladder contains a round hyperechoic non mobile nonshadowing 3 mm   polyp which is likely benign, similar to prior. No shadowing   gallstones are seen. No gallbladder wall thickening. Sonographic   Reveles sign is negative.     Assessment/Plan   Luz Castillo is a 68 y.o. female who presents to GI / Liver clinic for follow up of chronic HCV infection (cured), advanced fibrosis.    68/ F with chronic Hep C, genotype 1a, s.p Mavyret x 8 weeks, completed 03/2022 with SVR.   She continues to remain sober and abstain from IVDU however continues to smoke.    It was explained to the patient that her Hep C antibody test will stay positive but a negative HCV RNA means her infection is cured.     Fibrosis improved from F3 to F0-1 within a year: pre-treatment fibrosis score/liver stiffness driven by inflammation? Major relevant question revolves around her future liver cancer risk, and the need for ongoing HCC screening and surveillance.     She had remote vaccination to Hep B. Pt has immunity to Hep A but not to Hep B.       Plan   - Counseled re:  smoking cessation   - Recommend booster for Hep B now and recheck HbsAb next year: HB S Ab level is low, can repeat the seriew.   - Follow up with hepatic panel, liver US and fibroscan next year, Sept/Oct 2024.  - Colonoscopy due in 2025 for a " 10 mm SSA     Instructions      Tristan Isabel MD

## 2023-10-29 ASSESSMENT — ENCOUNTER SYMPTOMS
ENDOCRINE NEGATIVE: 1
CONSTITUTIONAL NEGATIVE: 1
EYES NEGATIVE: 1
GASTROINTESTINAL NEGATIVE: 1
CARDIOVASCULAR NEGATIVE: 1

## 2023-11-02 ENCOUNTER — OFFICE VISIT (OUTPATIENT)
Dept: PRIMARY CARE | Facility: CLINIC | Age: 68
End: 2023-11-02
Payer: MEDICARE

## 2023-11-02 VITALS
OXYGEN SATURATION: 97 % | DIASTOLIC BLOOD PRESSURE: 71 MMHG | SYSTOLIC BLOOD PRESSURE: 136 MMHG | HEART RATE: 81 BPM | WEIGHT: 148 LBS | BODY MASS INDEX: 26.22 KG/M2

## 2023-11-02 DIAGNOSIS — Z00.00 ROUTINE GENERAL MEDICAL EXAMINATION AT HEALTH CARE FACILITY: Primary | ICD-10-CM

## 2023-11-02 DIAGNOSIS — E03.9 HYPOTHYROIDISM, UNSPECIFIED TYPE: ICD-10-CM

## 2023-11-02 DIAGNOSIS — E78.5 HYPERLIPIDEMIA, UNSPECIFIED HYPERLIPIDEMIA TYPE: ICD-10-CM

## 2023-11-02 DIAGNOSIS — I10 HYPERTENSION, UNSPECIFIED TYPE: ICD-10-CM

## 2023-11-02 PROCEDURE — 1159F MED LIST DOCD IN RCRD: CPT | Performed by: INTERNAL MEDICINE

## 2023-11-02 PROCEDURE — 3078F DIAST BP <80 MM HG: CPT | Performed by: INTERNAL MEDICINE

## 2023-11-02 PROCEDURE — 1160F RVW MEDS BY RX/DR IN RCRD: CPT | Performed by: INTERNAL MEDICINE

## 2023-11-02 PROCEDURE — 99213 OFFICE O/P EST LOW 20 MIN: CPT | Performed by: INTERNAL MEDICINE

## 2023-11-02 PROCEDURE — 3075F SYST BP GE 130 - 139MM HG: CPT | Performed by: INTERNAL MEDICINE

## 2023-11-02 PROCEDURE — 1170F FXNL STATUS ASSESSED: CPT | Performed by: INTERNAL MEDICINE

## 2023-11-02 PROCEDURE — G0439 PPPS, SUBSEQ VISIT: HCPCS | Performed by: INTERNAL MEDICINE

## 2023-11-02 PROCEDURE — 1126F AMNT PAIN NOTED NONE PRSNT: CPT | Performed by: INTERNAL MEDICINE

## 2023-11-02 PROCEDURE — 4004F PT TOBACCO SCREEN RCVD TLK: CPT | Performed by: INTERNAL MEDICINE

## 2023-11-02 ASSESSMENT — ACTIVITIES OF DAILY LIVING (ADL)
DOING_HOUSEWORK: INDEPENDENT
DRESSING: INDEPENDENT
TAKING_MEDICATION: INDEPENDENT
BATHING: INDEPENDENT
MANAGING_FINANCES: INDEPENDENT
GROCERY_SHOPPING: INDEPENDENT

## 2023-11-02 ASSESSMENT — PATIENT HEALTH QUESTIONNAIRE - PHQ9
SUM OF ALL RESPONSES TO PHQ9 QUESTIONS 1 AND 2: 0
1. LITTLE INTEREST OR PLEASURE IN DOING THINGS: NOT AT ALL
2. FEELING DOWN, DEPRESSED OR HOPELESS: NOT AT ALL

## 2023-11-02 NOTE — PROGRESS NOTES
Subjective   Reason for Visit: Luz Castillo is an 68 y.o. female here for a Medicare Wellness visit.               HPI    Patient Care Team:  Mark Higuera DO as PCP - General (Internal Medicine)  Mark Higuera DO as PCP - [employer] PCP     Review of Systems    Objective   Vitals:  /71   Pulse 81   Wt 67.1 kg (148 lb)   SpO2 97%   BMI 26.22 kg/m²       Physical Exam    Assessment/Plan   Problem List Items Addressed This Visit     Hyperlipidemia    Relevant Orders    Lipid panel    Hypertension - Primary    Relevant Orders    CBC    Comprehensive metabolic panel    Hypothyroidism    Relevant Orders    TSH   Other Visit Diagnoses     Routine general medical examination at health care facility

## 2023-11-02 NOTE — PATIENT INSTRUCTIONS
COVID and RSV vaccines  Recommend over the counter melatonin  Get your fasting blood work done  Return to clinic in 6 months with fasting blood work done a few days prior

## 2023-11-02 NOTE — PROGRESS NOTES
Subjective   Patient ID: Luz Castillo is a 68 y.o. female who presents for Medicare Annual Wellness Visit Subsequent.    HPI   -trigger finger bothering her, right hand, constant ache  -Scheduled to see Dr. Singh on 11/15 about her trigger finger  -he has told her that she cannot get anymore injections and will have to consider surgery as a next step    -saw GI for constipation  -sometimes takes miralax, doculax if that doesn't work, but she dislikes how it makes her feel  -taking 1200iu of calcium per day    -back pain has improved, but notes that she hasn't been keeping up with her home PT exercises    -reports difficulty sleeping, asking about melatonin    Review of Systems   All other systems reviewed and are negative.      Objective   /71   Pulse 81   Wt 67.1 kg (148 lb)   SpO2 97%   BMI 26.22 kg/m²     Physical Exam  Constitutional:       General: She is not in acute distress.     Appearance: Normal appearance. She is not ill-appearing.   HENT:      Head: Normocephalic and atraumatic.      Nose: Nose normal.      Mouth/Throat:      Mouth: Mucous membranes are moist.      Pharynx: Oropharynx is clear.   Eyes:      Conjunctiva/sclera: Conjunctivae normal.   Cardiovascular:      Rate and Rhythm: Normal rate and regular rhythm.      Pulses: Normal pulses.      Heart sounds: Normal heart sounds.   Pulmonary:      Effort: Pulmonary effort is normal.      Breath sounds: Normal breath sounds.   Abdominal:      General: Abdomen is flat.      Palpations: Abdomen is soft.      Tenderness: There is no abdominal tenderness.   Musculoskeletal:      Comments: Reduced extension of third and fourth digits of right hand.   Skin:     General: Skin is warm and dry.   Neurological:      Mental Status: She is alert.   Psychiatric:         Mood and Affect: Mood normal.         Behavior: Behavior normal.         Assessment/Plan   #Insomnia  -recommend otc melatonin    #HLD, TIA  -No new neuro symptoms. Cont ASA and  clopidogrel  -Cont atorvastatin 40mg daily     #Cervical radiculopathy and low back pain   -Follows with Dr. Issa and Dr. Logan Gee  -continue home PT exercises      #Tongue lesion  #Oral leukoplakia  -bx benign, close f/u with Dr. Gallardo      #Hypothyroidism, multinodular goiter   -Continue levothyroxine     #Hepatitis C,  -s/p Mavyret, Following with hepatology who plan to re-US liver and recheck LFTs in 1 year     #HTN  -Well controlled, cont lisinopril      #GERD  -cont PPI    #Constipation  -Following with GI  -Fiber and Miralax     #Polysubstance abuse, in remission   -Cont 12 step meetings and outpatient treatment    #Tobacco use disorder  -Discussed quitting and Chantix. Patient states not ready to try quitting at this time    Influenza: Declines   RSV: recommended  COVID: x2, recommended  Prevnar 13: Never  Pneumovax 23: 21  Shingrix: Never, recommended  Mamm: 23  DEXA: 3/24/22  Pap: Never  Colorectal ca: 10/5/22- 5 years   Lung ca screenin23 - repeat in 1 year    RTC 6 mo       William Combs DO  Internal Medicine PGY1

## 2023-11-29 DIAGNOSIS — K21.9 GASTROESOPHAGEAL REFLUX DISEASE WITHOUT ESOPHAGITIS: ICD-10-CM

## 2023-11-29 RX ORDER — PANTOPRAZOLE SODIUM 40 MG/1
40 TABLET, DELAYED RELEASE ORAL
Qty: 180 TABLET | Refills: 0 | Status: SHIPPED | OUTPATIENT
Start: 2023-11-29 | End: 2024-03-11 | Stop reason: SDUPTHER

## 2023-12-05 ENCOUNTER — OFFICE VISIT (OUTPATIENT)
Dept: OTOLARYNGOLOGY | Facility: CLINIC | Age: 68
End: 2023-12-05
Payer: MEDICARE

## 2023-12-05 VITALS — HEIGHT: 63 IN | WEIGHT: 145 LBS | BODY MASS INDEX: 25.69 KG/M2

## 2023-12-05 DIAGNOSIS — K13.21 LEUKOPLAKIA, TONGUE: Primary | ICD-10-CM

## 2023-12-05 DIAGNOSIS — F17.200 SMOKER: ICD-10-CM

## 2023-12-05 PROCEDURE — 1126F AMNT PAIN NOTED NONE PRSNT: CPT | Performed by: OTOLARYNGOLOGY

## 2023-12-05 PROCEDURE — 1159F MED LIST DOCD IN RCRD: CPT | Performed by: OTOLARYNGOLOGY

## 2023-12-05 PROCEDURE — 4004F PT TOBACCO SCREEN RCVD TLK: CPT | Performed by: OTOLARYNGOLOGY

## 2023-12-05 PROCEDURE — 1160F RVW MEDS BY RX/DR IN RCRD: CPT | Performed by: OTOLARYNGOLOGY

## 2023-12-05 PROCEDURE — 99214 OFFICE O/P EST MOD 30 MIN: CPT | Performed by: OTOLARYNGOLOGY

## 2023-12-05 NOTE — PROGRESS NOTES
Chief Complaint   Patient presents with    Follow-up     EP(6/13/23)- 6 MO F/U TONGUE MASS, LEUKOPLAKIA      HPI:  Luz Castillo is a 68 y.o. female who presents in follow-up today for continued evaluation of chronic left lateral tongue lesion initially seen and biopsied in May 2022.   She reports no changes.   She reports no change. No sore throat, hoarseness, dysphagia, neck pain, neck mass.  Continues to do well without any symptoms.  She is doing well without any pain. Only occasionally she will notice it when of some hard food touches it or pokes into it.  No changes  Bx 5/2022 showed some chronic inflammation and mild atypia. No obvious dysplasia or malignancy. Biopsy per report was adequate but superficial and malignancy cannot be entirely ruled out.      She is a smoker. 1 pack a day.   Clean and sober for over a year now.   Having surgery on her hand next week.    5/2022 Path:  TONGUE, LEFT LATERAL, EXCISION:        CHRONICALLY INFLAMED SQUAMOUS MUCOSA SHOWING PROLIFERATIVE CHANGES             WITH MILD ATYPIA,      HYPERKERATOSIS AND PARAKERATOSIS, SEE   COMMENT.     Comment: The excised tissue is relatively superficial and the lesion is   partially transected at the deep aspect and the base of the lesion is   not entirely represented. A neoplastic process is not excluded. Given   the clinical findings of mass, the current biopsy may not be   representative of the pathologic process. Clinical correlation is   recommended.   PMH:  Past Medical History:   Diagnosis Date    Alcohol dependence, in remission (CMS/HCC) 12/26/2019    History of alcoholism    Alcohol dependence, in remission (CMS/HCC) 09/20/2021    History of alcoholism    Anxiety disorder, unspecified     Anxiety and depression    Chronic viral hepatitis C (CMS/HCC) 11/29/2021    Chronic viral hepatitis C    Other long term (current) drug therapy 05/06/2021    High risk medication use    Personal history of diseases of the blood and  blood-forming organs and certain disorders involving the immune mechanism     History of autoimmune disorder    Personal history of other diseases of the circulatory system     History of hypertension    Personal history of other diseases of the digestive system 09/20/2021    History of gastroesophageal reflux (GERD)    Personal history of other diseases of the female genital tract     History of endometriosis    Personal history of other endocrine, nutritional and metabolic disease 03/10/2021    History of goiter    Personal history of other infectious and parasitic diseases 03/17/2022    History of hepatitis C virus infection     Past Surgical History:   Procedure Laterality Date    MR HEAD ANGIO WO IV CONTRAST  1/12/2023    MR HEAD ANGIO WO IV CONTRAST 1/12/2023 [facility]    MR HEAD ANGIO WO IV CONTRAST  4/19/2018    MR HEAD ANGIO WO IV CONTRAST [facility]    MR NECK ANGIO WO IV CONTRAST  1/12/2023    MR NECK ANGIO WO IV CONTRAST 1/12/2023 [facility]    OTHER SURGICAL HISTORY  11/11/2021    Appendectomy    OTHER SURGICAL HISTORY  11/11/2021    Hernia repair    OTHER SURGICAL HISTORY  11/11/2021    Laparoscopy    OTHER SURGICAL HISTORY  11/11/2021    Shoulder replacement    OTHER SURGICAL HISTORY  11/11/2021    Shoulder arthroscopy    OTHER SURGICAL HISTORY  11/11/2021    Tubal ligation    OTHER SURGICAL HISTORY  11/11/2021    Colonic polypectomy    OTHER SURGICAL HISTORY  11/11/2021    Colonoscopy         Medications:     Current Outpatient Medications:     acetaminophen (Tylenol) 500 mg tablet, Take 1 tablet (500 mg) by mouth every 6 hours., Disp: , Rfl:     amoxicillin (Amoxil) 500 mg capsule, Take by mouth., Disp: , Rfl:     aspirin 81 mg EC tablet, Take 1 tablet (81 mg) by mouth once daily., Disp: , Rfl:     atorvastatin (Lipitor) 40 mg tablet, Take 1 tablet (40 mg) by mouth once daily at bedtime., Disp: 90 tablet, Rfl: 1    b complex vitamins capsule, Take 1 capsule by mouth  "once daily., Disp: , Rfl:     calcium carbonate 600 mg calcium (1,500 mg) tablet, Take 2 tablets (1,200 mg) by mouth., Disp: , Rfl:     cholecalciferol (Vitamin D-3) 25 MCG (1000 UT) capsule, Take 2 capsules (50 mcg) by mouth., Disp: , Rfl:     clopidogrel (Plavix) 75 mg tablet, Take 1 tablet (75 mg) by mouth once daily., Disp: , Rfl:     folic acid (Folvite) 1 mg tablet, Take 1 tablet (1 mg) by mouth once daily., Disp: , Rfl:     levothyroxine (Synthroid, Levoxyl) 100 mcg tablet, Take 1 tablet (100 mcg) by mouth once daily., Disp: 90 tablet, Rfl: 1    lisinopril 20 mg tablet, Take 1 tablet (20 mg) by mouth once daily., Disp: 90 tablet, Rfl: 1    magnesium glycinate 100 mg magnesium capsule, Take by mouth., Disp: , Rfl:     multivit-min/ferrous gluconate (CENTAMIN ORAL), Take by mouth., Disp: , Rfl:     olopatadine (Pataday) 0.2 % ophthalmic solution, Administer into affected eye(s) once daily., Disp: , Rfl:     omega 3-dha-epa-fish oil 360 mg-108 mg- 180 mg-1,200 mg capsule, Take 1 capsule by mouth once daily., Disp: , Rfl:     pantoprazole (ProtoNix) 40 mg EC tablet, TAKE 1 TABLET BY MOUTH TWICE A DAY. 30 MINUTES BEFORE BREAKFAST AND DINNER, Disp: 180 tablet, Rfl: 0     Allergies:  Allergies   Allergen Reactions    Atorvastatin Hives and Unknown    Celecoxib Unknown     Comments: heart races    Diphenhydramine Unknown    Iodine Itching and Swelling    Pramipexole Unknown    Rofecoxib Other     tacjycardia        ROS:  Review of systems normal unless stated otherwise in the HPI and/or PMH.    Physical Exam:  Height 1.6 m (5' 3\"), weight 65.8 kg (145 lb). Body mass index is 25.69 kg/m².     GENERAL APPEARANCE: Well developed and well nourished.  Alert and oriented in no acute distress.  Normal vocal quality.      HEAD/FACE: No erythema or edema or facial tenderness.  Normal facial nerve function bilaterally.    EAR:       EXTERNAL: Normal pinnas and external auditory canals without lesion or obstructing wax.       " MIDDLE EAR: Tympanic membranes intact and mobile with normal landmarks.  Middle ear space appears well aerated.       TUBE STATUS: N/A       MASTOID CAVITY: N/A       HEARING: Gross hearing assessment is within normal limits.      NOSE:       VISUALIZED USING: Anterior rhinoscopy with headlight and nasal speculum.       DORSUM: Midline, nontraumatic appearance.       MUCOSA: Normal-appearing.       SECRETIONS: Normal.       SEPTUM: Midline and nonobstructing.       INFERIOR TURBINATES: Normal.       MIDDLE TURBINATES/MEATUS: N/A       BLEEDING: N/A         ORAL CAVITY/PHARYNX:       TEETH: Adequate dentition.       TONGUE: No change in the approximate 2 x 4 cm left lateral tongue plaque-like area of leukoplakia.  Without any palpable depth.  No ulceration.  No tenderness.  Normal mobility.       FLOOR OF MOUTH: No mass or lesion.       PALATE: Normal hard palate, soft palate, and uvula.       OROPHARYNX: Normal without mass or lesion.       BUCCAL MUCOSA/GBS: Normal without mass or lesion.       LIPS: Normal.    LARYNX/HYPOPHARYNX/NASOPHARYNX: N/A    NECK: No palpable masses or abnormal adenopathy.  Trachea is midline.    THYROID: No thyromegaly or palpable nodule.    SALIVARY GLANDS: Normal bilateral parotid and submandibular glands by inspection and palpation.    TMJ's: Normal.    NEURO: Cranial nerve exam grossly normal bilaterally.       Assessment/Plan   Luz was seen today for follow-up.  Diagnoses and all orders for this visit:  Leukoplakia, tongue (Primary)  Smoker   No changes. She continues to be aware that dysplasia, malignancy, or transformation to either of those can never be ruled out without surgical excision. She continues to not wish to do this. I do not see any changes and I think we can continue observation. In the future we may do another biopsy to keep on top of this to make sure there is no changes. We will see her again in 6 months or sooner with any changes she notices. Try to stop  smoking   Follow up in about 6 months (around 6/5/2024) for Recheck, sooner with changes or worsening symptoms.     Toy Gallardo MD

## 2024-02-26 DIAGNOSIS — I10 PRIMARY HYPERTENSION: ICD-10-CM

## 2024-02-27 RX ORDER — LISINOPRIL 20 MG/1
20 TABLET ORAL DAILY
Qty: 90 TABLET | Refills: 1 | Status: SHIPPED | OUTPATIENT
Start: 2024-02-27

## 2024-03-06 DIAGNOSIS — R73.01 IFG (IMPAIRED FASTING GLUCOSE): ICD-10-CM

## 2024-03-06 DIAGNOSIS — I10 HYPERTENSION, UNSPECIFIED TYPE: ICD-10-CM

## 2024-03-06 NOTE — PROGRESS NOTES
Orders Placed This Encounter   Procedures    Hemoglobin A1C     Standing Status:   Future     Standing Expiration Date:   3/6/2025     Order Specific Question:   Release result to [facility]     Answer:   Immediate

## 2024-03-11 DIAGNOSIS — K21.9 GASTROESOPHAGEAL REFLUX DISEASE WITHOUT ESOPHAGITIS: ICD-10-CM

## 2024-03-11 RX ORDER — PANTOPRAZOLE SODIUM 40 MG/1
40 TABLET, DELAYED RELEASE ORAL
Qty: 180 TABLET | Refills: 0 | Status: SHIPPED | OUTPATIENT
Start: 2024-03-11 | End: 2024-06-09

## 2024-03-13 ENCOUNTER — LAB (OUTPATIENT)
Dept: LAB | Facility: LAB | Age: 69
End: 2024-03-13
Payer: MEDICARE

## 2024-03-13 DIAGNOSIS — E78.5 HYPERLIPIDEMIA, UNSPECIFIED HYPERLIPIDEMIA TYPE: ICD-10-CM

## 2024-03-13 DIAGNOSIS — R73.01 IFG (IMPAIRED FASTING GLUCOSE): ICD-10-CM

## 2024-03-13 DIAGNOSIS — I10 HYPERTENSION, UNSPECIFIED TYPE: ICD-10-CM

## 2024-03-13 DIAGNOSIS — E03.9 HYPOTHYROIDISM, UNSPECIFIED TYPE: ICD-10-CM

## 2024-03-13 LAB
ALBUMIN SERPL BCP-MCNC: 4.5 G/DL (ref 3.4–5)
ALP SERPL-CCNC: 54 U/L (ref 33–136)
ALT SERPL W P-5'-P-CCNC: 68 U/L (ref 7–45)
ANION GAP SERPL CALC-SCNC: 17 MMOL/L (ref 10–20)
AST SERPL W P-5'-P-CCNC: 46 U/L (ref 9–39)
BILIRUB SERPL-MCNC: 0.3 MG/DL (ref 0–1.2)
BUN SERPL-MCNC: 16 MG/DL (ref 6–23)
CALCIUM SERPL-MCNC: 9.5 MG/DL (ref 8.6–10.3)
CHLORIDE SERPL-SCNC: 101 MMOL/L (ref 98–107)
CHOLEST SERPL-MCNC: 159 MG/DL (ref 0–199)
CHOLESTEROL/HDL RATIO: 2.5
CO2 SERPL-SCNC: 22 MMOL/L (ref 21–32)
CREAT SERPL-MCNC: 0.83 MG/DL (ref 0.5–1.05)
EGFRCR SERPLBLD CKD-EPI 2021: 77 ML/MIN/1.73M*2
ERYTHROCYTE [DISTWIDTH] IN BLOOD BY AUTOMATED COUNT: 12.7 % (ref 11.5–14.5)
EST. AVERAGE GLUCOSE BLD GHB EST-MCNC: 114 MG/DL
GLUCOSE SERPL-MCNC: 97 MG/DL (ref 74–99)
HBA1C MFR BLD: 5.6 %
HCT VFR BLD AUTO: 44.5 % (ref 36–46)
HDLC SERPL-MCNC: 63.5 MG/DL
HGB BLD-MCNC: 15 G/DL (ref 12–16)
LDLC SERPL CALC-MCNC: 79 MG/DL
MCH RBC QN AUTO: 31.9 PG (ref 26–34)
MCHC RBC AUTO-ENTMCNC: 33.7 G/DL (ref 32–36)
MCV RBC AUTO: 95 FL (ref 80–100)
NON HDL CHOLESTEROL: 96 MG/DL (ref 0–149)
NRBC BLD-RTO: 0 /100 WBCS (ref 0–0)
PLATELET # BLD AUTO: 234 X10*3/UL (ref 150–450)
POTASSIUM SERPL-SCNC: 4.5 MMOL/L (ref 3.5–5.3)
PROT SERPL-MCNC: 7.3 G/DL (ref 6.4–8.2)
RBC # BLD AUTO: 4.7 X10*6/UL (ref 4–5.2)
SODIUM SERPL-SCNC: 135 MMOL/L (ref 136–145)
TRIGL SERPL-MCNC: 82 MG/DL (ref 0–149)
TSH SERPL-ACNC: 0.27 MIU/L (ref 0.44–3.98)
VLDL: 16 MG/DL (ref 0–40)
WBC # BLD AUTO: 5 X10*3/UL (ref 4.4–11.3)

## 2024-03-13 PROCEDURE — 84443 ASSAY THYROID STIM HORMONE: CPT

## 2024-03-13 PROCEDURE — 80061 LIPID PANEL: CPT

## 2024-03-13 PROCEDURE — 80053 COMPREHEN METABOLIC PANEL: CPT

## 2024-03-13 PROCEDURE — 36415 COLL VENOUS BLD VENIPUNCTURE: CPT

## 2024-03-13 PROCEDURE — 85027 COMPLETE CBC AUTOMATED: CPT

## 2024-03-13 PROCEDURE — 83036 HEMOGLOBIN GLYCOSYLATED A1C: CPT

## 2024-03-18 PROBLEM — F17.200 SMOKES TOBACCO DAILY: Status: ACTIVE | Noted: 2024-03-18

## 2024-03-18 PROBLEM — Z86.79 HISTORY OF HYPERTENSION: Status: ACTIVE | Noted: 2024-03-18

## 2024-03-18 PROBLEM — F17.200 NICOTINE DEPENDENCE: Status: ACTIVE | Noted: 2024-03-18

## 2024-03-18 PROBLEM — K21.9 GASTROESOPHAGEAL REFLUX DISEASE: Status: ACTIVE | Noted: 2024-03-18

## 2024-03-18 PROBLEM — E66.9 OBESITY WITH BODY MASS INDEX 30 OR GREATER: Status: ACTIVE | Noted: 2024-03-18

## 2024-03-18 NOTE — PROGRESS NOTES
Subjective   Patient ID: Luz Castillo is a 68 y.o. female who presents for Follow-up (6 month follow up /) and Sinusitis.    HPI     Reports b/l ear pressure, worse on L> R. +sinus pressure and congestions. No fever, chills, night sweats  Need to review labs that were recently     Smoking PPD, not intersted in quitting     Otherwise she is doing well. Need to review labs that were recently completed     Review of Systems   All other systems reviewed and are negative.      Objective   Wt 66.2 kg (146 lb)   BMI 25.86 kg/m²     Physical Exam  Constitutional:       Appearance: Normal appearance.   HENT:      Head: Normocephalic and atraumatic.      Right Ear: Tympanic membrane, ear canal and external ear normal.      Left Ear: Tympanic membrane, ear canal and external ear normal.   Cardiovascular:      Rate and Rhythm: Normal rate and regular rhythm.      Heart sounds: Normal heart sounds. No murmur heard.     No gallop.   Pulmonary:      Effort: Pulmonary effort is normal. No respiratory distress.      Breath sounds: No wheezing or rales.   Skin:     General: Skin is warm and dry.      Findings: No rash.   Neurological:      Mental Status: She is alert and oriented to person, place, and time. Mental status is at baseline.   Psychiatric:         Mood and Affect: Mood normal.         Behavior: Behavior normal.         Assessment/Plan        #b/l ETD  -Recommended OTC Flonase      #HLD, TIA  -No new neuro symptoms. Cont ASA and clopidogrel  -Cont atorvastatin 40mg daily     #Cervical radiculopathy and low back pain   -Follows with Dr. Issa and Dr. Logan Gee     #Tongue lesion  #Oral leukoplakia  -bx benign, close f/u with Dr. Gallardo      #Hypothyroidism, multinodular goiter   -Decrease levothyroxine 100mcg to 6 days weekly      #Hepatitis C,  -s/p Mavyret, Following with hepatology   -Mild ALT/AST elevation- will see Dr. Isabel in October      #HTN  -Well controlled, cont lisinopril      #GERD  -cont PPI      #Constipation  -Following with GI  -Fiber and Miralax     #Polysubstance abuse, in remission   -Cont 12 step meetings and outpatient treatment     #Tobacco use disorder  -Discussed quitting and Chantix. Patient states not ready to try quitting at this time     Influenza: Declines   RSV: recommended  COVID: x2, recommended  Prevnar 13: Never  Pneumovax 23: 21  Shingrix: Never, recommended  Mamm: 23--ordered   DEXA: 3/24/22--ordered   Pap: Never  Colorectal ca: 10/5/22- 5 years   Lung ca screenin23 - ordered     RTC 6 mo

## 2024-03-20 ENCOUNTER — OFFICE VISIT (OUTPATIENT)
Dept: PRIMARY CARE | Facility: CLINIC | Age: 69
End: 2024-03-20
Payer: MEDICARE

## 2024-03-20 VITALS
SYSTOLIC BLOOD PRESSURE: 133 MMHG | OXYGEN SATURATION: 97 % | DIASTOLIC BLOOD PRESSURE: 80 MMHG | HEART RATE: 73 BPM | WEIGHT: 146 LBS | BODY MASS INDEX: 25.86 KG/M2

## 2024-03-20 DIAGNOSIS — M85.80 OSTEOPENIA, UNSPECIFIED LOCATION: ICD-10-CM

## 2024-03-20 DIAGNOSIS — Z12.31 ENCOUNTER FOR SCREENING MAMMOGRAM FOR MALIGNANT NEOPLASM OF BREAST: ICD-10-CM

## 2024-03-20 DIAGNOSIS — F17.210 CIGARETTE NICOTINE DEPENDENCE WITHOUT COMPLICATION: ICD-10-CM

## 2024-03-20 DIAGNOSIS — E03.9 HYPOTHYROIDISM, UNSPECIFIED TYPE: Primary | ICD-10-CM

## 2024-03-20 DIAGNOSIS — H69.93 DYSFUNCTION OF BOTH EUSTACHIAN TUBES: ICD-10-CM

## 2024-03-20 PROCEDURE — 99214 OFFICE O/P EST MOD 30 MIN: CPT | Performed by: INTERNAL MEDICINE

## 2024-03-20 PROCEDURE — 3079F DIAST BP 80-89 MM HG: CPT | Performed by: INTERNAL MEDICINE

## 2024-03-20 PROCEDURE — 3075F SYST BP GE 130 - 139MM HG: CPT | Performed by: INTERNAL MEDICINE

## 2024-03-20 PROCEDURE — 1160F RVW MEDS BY RX/DR IN RCRD: CPT | Performed by: INTERNAL MEDICINE

## 2024-03-20 PROCEDURE — 1159F MED LIST DOCD IN RCRD: CPT | Performed by: INTERNAL MEDICINE

## 2024-03-20 RX ORDER — LEVOTHYROXINE SODIUM 100 UG/1
TABLET ORAL
Qty: 90 TABLET | Refills: 1 | Status: SHIPPED | OUTPATIENT
Start: 2024-03-20

## 2024-03-20 NOTE — PATIENT INSTRUCTIONS
A1c and BP are normal  Decrease levothyroxine to 6 days /week  Use Flonase for sinus'  CT chest, mamm/DEXA [phone]  I will reach out to Dr. Isabel regarding elevated livery enzymes     6 months

## 2024-03-21 DIAGNOSIS — Z00.00 HEALTHCARE MAINTENANCE: Primary | ICD-10-CM

## 2024-03-21 RX ORDER — AMOXICILLIN 500 MG/1
CAPSULE ORAL
Qty: 4 CAPSULE | Refills: 0 | Status: SHIPPED | OUTPATIENT
Start: 2024-03-21

## 2024-03-21 NOTE — TELEPHONE ENCOUNTER
Requested Prescriptions     Pending Prescriptions Disp Refills    amoxicillin (Amoxil) 500 mg capsule 4 capsule 0     Sig: Take 4 tablets one time for dental procedure      \  
no

## 2024-04-17 DIAGNOSIS — E78.5 HYPERLIPIDEMIA, UNSPECIFIED HYPERLIPIDEMIA TYPE: ICD-10-CM

## 2024-04-18 RX ORDER — ATORVASTATIN CALCIUM 40 MG/1
40 TABLET, FILM COATED ORAL NIGHTLY
Qty: 90 TABLET | Refills: 1 | Status: SHIPPED | OUTPATIENT
Start: 2024-04-18

## 2024-05-02 ENCOUNTER — APPOINTMENT (OUTPATIENT)
Dept: PRIMARY CARE | Facility: CLINIC | Age: 69
End: 2024-05-02
Payer: MEDICARE

## 2024-05-06 ENCOUNTER — HOSPITAL ENCOUNTER (OUTPATIENT)
Dept: RADIOLOGY | Facility: HOSPITAL | Age: 69
Discharge: HOME | End: 2024-05-06
Payer: MEDICARE

## 2024-05-06 VITALS — WEIGHT: 148 LBS | HEIGHT: 63 IN | BODY MASS INDEX: 26.22 KG/M2

## 2024-05-06 DIAGNOSIS — M85.80 OSTEOPENIA, UNSPECIFIED LOCATION: ICD-10-CM

## 2024-05-06 DIAGNOSIS — F17.210 CIGARETTE NICOTINE DEPENDENCE WITHOUT COMPLICATION: ICD-10-CM

## 2024-05-06 DIAGNOSIS — Z12.31 ENCOUNTER FOR SCREENING MAMMOGRAM FOR MALIGNANT NEOPLASM OF BREAST: ICD-10-CM

## 2024-05-06 PROCEDURE — 77063 BREAST TOMOSYNTHESIS BI: CPT | Performed by: RADIOLOGY

## 2024-05-06 PROCEDURE — 77080 DXA BONE DENSITY AXIAL: CPT

## 2024-05-06 PROCEDURE — 71271 CT THORAX LUNG CANCER SCR C-: CPT

## 2024-05-06 PROCEDURE — 77080 DXA BONE DENSITY AXIAL: CPT | Performed by: STUDENT IN AN ORGANIZED HEALTH CARE EDUCATION/TRAINING PROGRAM

## 2024-05-06 PROCEDURE — 77067 SCR MAMMO BI INCL CAD: CPT | Performed by: RADIOLOGY

## 2024-05-06 PROCEDURE — 77067 SCR MAMMO BI INCL CAD: CPT

## 2024-05-09 ENCOUNTER — OFFICE VISIT (OUTPATIENT)
Dept: PRIMARY CARE | Facility: CLINIC | Age: 69
End: 2024-05-09
Payer: MEDICARE

## 2024-05-09 VITALS
OXYGEN SATURATION: 97 % | DIASTOLIC BLOOD PRESSURE: 72 MMHG | HEART RATE: 72 BPM | SYSTOLIC BLOOD PRESSURE: 134 MMHG | WEIGHT: 150 LBS | BODY MASS INDEX: 26.57 KG/M2

## 2024-05-09 DIAGNOSIS — M54.2 NECK PAIN: Primary | ICD-10-CM

## 2024-05-09 PROCEDURE — 1159F MED LIST DOCD IN RCRD: CPT | Performed by: INTERNAL MEDICINE

## 2024-05-09 PROCEDURE — 99213 OFFICE O/P EST LOW 20 MIN: CPT | Performed by: INTERNAL MEDICINE

## 2024-05-09 PROCEDURE — 1160F RVW MEDS BY RX/DR IN RCRD: CPT | Performed by: INTERNAL MEDICINE

## 2024-05-09 PROCEDURE — 3075F SYST BP GE 130 - 139MM HG: CPT | Performed by: INTERNAL MEDICINE

## 2024-05-09 PROCEDURE — 3078F DIAST BP <80 MM HG: CPT | Performed by: INTERNAL MEDICINE

## 2024-05-09 RX ORDER — PREDNISONE 20 MG/1
20 TABLET ORAL DAILY
Qty: 5 TABLET | Refills: 0 | Status: SHIPPED | OUTPATIENT
Start: 2024-05-09 | End: 2024-05-14

## 2024-05-09 NOTE — PATIENT INSTRUCTIONS
Take prednisone      Physical Therapy main number [phone]  [facility] ([facility]) [phone]    Get OTC  or see dentist for teeth grinding

## 2024-05-09 NOTE — PROGRESS NOTES
Subjective   Patient ID: Luz Castillo is a 68 y.o. female who presents for No chief complaint on file..    HPI   Patient is here for issue with neck that began on Sunday.   She has pain in right occipital region that radiates to her right jaw. Most of the time the pain is dull but sometimes gets a shooting sharp pain. Has radiation down to shoulder, but not down arm. She has been taking ibuprofen and using a heating pad, they have both been helping. Moving her head from side to side causes a popping noise in her neck so she has tried to limit neck movement. She has been getting mild headaches once a day, depending on how she moves her head throughout the day. The headaches start in occipital region and moves to frontal region. She has had a similar neck pain in the past but usually it resolves within a couple of days. Was experiencing right sided jaw pain that radiated down to her mandible, also had achy teeth. She thinks she grinds her teeth at night and endorses popping in jaw  She went to physical therapy years ago and it helped. She has not been doing the exercises  Denies numbness, tingling, vision changes, speech changes.  Denies chest pain, SOB, leg edema.    April 2023 MRI cervical spine showed Degenerative changes of the cervical spine as above described worst at C5-6 where there is minimal flattening of the ventral spinal cord due to disc osteophyte complex without spinal cord signal abnormality identified with additional degenerative changes as above     Imaging results from 5/6/24:  Mammogram showed no evidence of malignancy  DEXA showed osteopenia  CT lung showed extensive smoking-related parenchymal changes without suspicious lung nodules. Repeat in 1 year    Review of Systems    Objective   There were no vitals taken for this visit.    Physical Exam  CONSTITUTIONAL - alert, in no acute distress, not ill-appearing  SKIN - normal skin color, warm  HEAD - no trauma, normocephalic  CARDIAC - regular  rate and regular rhythm, no murmur  EXTREMITIES - no edema, no deformities  MUSCULOSKELETAL: right occipital and trapezius hypertonicity. Reduced AROM of cervical spine on left rotation, flexion, left and right side bending  NEUROLOGICAL - alert, oriented x3 and no acute focal signs  PSYCHIATRIC - alert, pleasant and cordial, age-appropriate       Assessment/Plan   #Cervicalgia, TMJ  -Begin Prednisone 20 mg once a day for 5 days  -Physical therapy referral placed  -Instructed on use of nightguard  -Follow up with ENT in June    Note written by CHAD Phillip-III. Medical student note requires physician co-sign.

## 2024-05-14 ENCOUNTER — TELEPHONE (OUTPATIENT)
Dept: PRIMARY CARE | Facility: CLINIC | Age: 69
End: 2024-05-14
Payer: MEDICARE

## 2024-05-29 ENCOUNTER — OFFICE VISIT (OUTPATIENT)
Dept: PAIN MEDICINE | Facility: CLINIC | Age: 69
End: 2024-05-29
Payer: MEDICARE

## 2024-05-29 VITALS
DIASTOLIC BLOOD PRESSURE: 54 MMHG | BODY MASS INDEX: 26.58 KG/M2 | HEIGHT: 63 IN | WEIGHT: 150 LBS | OXYGEN SATURATION: 98 % | SYSTOLIC BLOOD PRESSURE: 110 MMHG | RESPIRATION RATE: 18 BRPM | HEART RATE: 58 BPM

## 2024-05-29 DIAGNOSIS — G44.86 CERVICOGENIC HEADACHE: ICD-10-CM

## 2024-05-29 DIAGNOSIS — M47.812 ARTHROPATHY OF CERVICAL FACET JOINT: ICD-10-CM

## 2024-05-29 DIAGNOSIS — M54.81 OCCIPITAL NEURALGIA OF RIGHT SIDE: Primary | ICD-10-CM

## 2024-05-29 PROCEDURE — 99214 OFFICE O/P EST MOD 30 MIN: CPT | Performed by: STUDENT IN AN ORGANIZED HEALTH CARE EDUCATION/TRAINING PROGRAM

## 2024-05-29 PROCEDURE — 99204 OFFICE O/P NEW MOD 45 MIN: CPT | Performed by: STUDENT IN AN ORGANIZED HEALTH CARE EDUCATION/TRAINING PROGRAM

## 2024-05-29 PROCEDURE — 3074F SYST BP LT 130 MM HG: CPT | Performed by: STUDENT IN AN ORGANIZED HEALTH CARE EDUCATION/TRAINING PROGRAM

## 2024-05-29 PROCEDURE — 3078F DIAST BP <80 MM HG: CPT | Performed by: STUDENT IN AN ORGANIZED HEALTH CARE EDUCATION/TRAINING PROGRAM

## 2024-05-29 PROCEDURE — 1125F AMNT PAIN NOTED PAIN PRSNT: CPT | Performed by: STUDENT IN AN ORGANIZED HEALTH CARE EDUCATION/TRAINING PROGRAM

## 2024-05-29 PROCEDURE — 1159F MED LIST DOCD IN RCRD: CPT | Performed by: STUDENT IN AN ORGANIZED HEALTH CARE EDUCATION/TRAINING PROGRAM

## 2024-05-29 PROCEDURE — 1160F RVW MEDS BY RX/DR IN RCRD: CPT | Performed by: STUDENT IN AN ORGANIZED HEALTH CARE EDUCATION/TRAINING PROGRAM

## 2024-05-29 RX ORDER — MELOXICAM 15 MG/1
15 TABLET ORAL DAILY
Qty: 30 TABLET | Refills: 2 | Status: SHIPPED | OUTPATIENT
Start: 2024-05-29 | End: 2024-08-27

## 2024-05-29 ASSESSMENT — PAIN SCALES - GENERAL
PAINLEVEL_OUTOF10: 4
PAINLEVEL: 4

## 2024-05-29 ASSESSMENT — PAIN DESCRIPTION - DESCRIPTORS: DESCRIPTORS: TIGHTNESS;SHARP

## 2024-05-29 ASSESSMENT — PAIN - FUNCTIONAL ASSESSMENT: PAIN_FUNCTIONAL_ASSESSMENT: 0-10

## 2024-05-29 ASSESSMENT — LIFESTYLE VARIABLES: TOTAL SCORE: 7

## 2024-05-29 NOTE — PROGRESS NOTES
"[facility]  New Patient Office Visit Note 2024    Patient Information: Luz Castillo, MRN: [id number], : 1955   Primary Care/Referring Physician: Mark Higuera DO, [id number] [facility] [address] [facility] [address]     Chief Complaint: Right neck and jaw pain  History of Pain: Ms. Luz Castillo is a 68 y.o. female with a PMHx of prior HTN, HLD, GERD, cirrhosis, depression, EtOH use/polysubstance use disorder (3 years sober) who presents for right neck and jaw pain.    She reports issues with neck pain for many years, but it has generally been self limited and improves with conservative care. She states that on 24 this flared up again. She woke up in the morning and had significant pain and stiffness. The pain starts in her right occiput and radiates to her right jaw and occasionally over the top of her right head. She describes the pain as \"annoying\" and it occasionally is sharp. She reports worsening of pain with extension of her neck, and with lateral bending of her head in either direction. She denies any vision or hearing changes when she has headaches. She occasionally has pain radiating to the right shoulder but not further down the arm. She denies any jaw clicking but feels \"crackling\" in her neck    Current Pain Medications: OTC Ibuprofen 400/600 mg  Previously Tried Pain Medications: PO steroids - cause her to be angry, Tylenol - no benefit, Tramadol. Thinks she has tried muscle relaxants in the past    Relevant Surgeries: Denies cervical spine surgery. History of bilateral shoulder replacement  Injections: Denies  Physical/Occupational Therapy: Is planning to start PT for this in a 1 week    Medications:   Current Outpatient Medications   Medication Instructions    acetaminophen (TYLENOL) 500 mg, oral, Every 6 hours    amoxicillin (Amoxil) 500 mg capsule Take 4 tablets one time for dental procedure    aspirin 81 mg EC tablet 1 tablet, oral, Daily    atorvastatin (LIPITOR) 40 " mg, oral, Nightly    b complex vitamins capsule 1 capsule, oral, Daily    calcium carbonate 1,200 mg, oral    cholecalciferol (VITAMIN D-3) 50 mcg, oral    folic acid (Folvite) 1 mg tablet 1 tablet, oral, Daily    levothyroxine (Synthroid, Levoxyl) 100 mcg tablet Take one tablet by mouth 6 days per week    lisinopril 20 mg, oral, Daily    magnesium glycinate 100 mg magnesium capsule oral    meloxicam (MOBIC) 15 mg, oral, Daily    multivit-min/ferrous gluconate (CENTAMIN ORAL) oral    olopatadine (Pataday) 0.2 % ophthalmic solution ophthalmic (eye), Daily RT    omega 3-dha-epa-fish oil 360 mg-108 mg- 180 mg-1,200 mg capsule 1 capsule, oral, Daily    pantoprazole (PROTONIX) 40 mg, oral, 2 times daily before meals, Take 30 minutes before breakfast and dinner    TURMERIC ORAL 500 mg, oral      Allergies:   Allergies   Allergen Reactions    Atorvastatin Hives and Unknown    Celecoxib Unknown     Comments: heart races    Diphenhydramine Unknown    Iodine Itching and Swelling    Pramipexole Unknown    Rofecoxib Other     tacjycardia       Past Medical & Surgical History:  Past Medical History:   Diagnosis Date    Alcohol dependence, in remission (Multi) 12/26/2019    History of alcoholism    Alcohol dependence, in remission (Multi) 09/20/2021    History of alcoholism    Anxiety disorder, unspecified     Anxiety and depression    Chronic viral hepatitis C (Multi) 11/29/2021    Chronic viral hepatitis C    Hepatitis C     Osteopenia     Other long term (current) drug therapy 05/06/2021    High risk medication use    Personal history of diseases of the blood and blood-forming organs and certain disorders involving the immune mechanism     History of autoimmune disorder    Personal history of other diseases of the circulatory system     History of hypertension    Personal history of other diseases of the digestive system 09/20/2021    History of gastroesophageal reflux (GERD)    Personal history of other diseases of the female  genital tract     History of endometriosis    Personal history of other endocrine, nutritional and metabolic disease 03/10/2021    History of goiter    Personal history of other infectious and parasitic diseases 03/17/2022    History of hepatitis C virus infection      Past Surgical History:   Procedure Laterality Date    MR HEAD ANGIO WO IV CONTRAST  1/12/2023    MR HEAD ANGIO WO IV CONTRAST 1/12/2023 [facility]    MR HEAD ANGIO WO IV CONTRAST  4/19/2018    MR HEAD ANGIO WO IV CONTRAST [facility]    MR NECK ANGIO WO IV CONTRAST  1/12/2023    MR NECK ANGIO WO IV CONTRAST 1/12/2023 [facility]    OTHER SURGICAL HISTORY  11/11/2021    Appendectomy    OTHER SURGICAL HISTORY  11/11/2021    Hernia repair    OTHER SURGICAL HISTORY  11/11/2021    Laparoscopy    OTHER SURGICAL HISTORY  11/11/2021    Shoulder replacement    OTHER SURGICAL HISTORY  11/11/2021    Shoulder arthroscopy    OTHER SURGICAL HISTORY  11/11/2021    Tubal ligation    OTHER SURGICAL HISTORY  11/11/2021    Colonic polypectomy    OTHER SURGICAL HISTORY  11/11/2021    Colonoscopy       Family History   Problem Relation Name Age of Onset    Heart disease Father      Diabetes Father       Social History     Socioeconomic History    Marital status: Single     Spouse name: Not on file    Number of children: Not on file    Years of education: Not on file    Highest education level: Not on file   Occupational History    Not on file   Tobacco Use    Smoking status: Every Day     Current packs/day: 1.00     Types: Cigarettes    Smokeless tobacco: Never   Substance and Sexual Activity    Alcohol use: Never    Drug use: Never    Sexual activity: Not on file   Other Topics Concern    Not on file   Social History Narrative    Not on file     Social Determinants of Health     Financial Resource Strain: Not on file   Food Insecurity: Not on file   Transportation Needs: Not on file   Physical Activity: Not on file   Stress: Not on file  "  Social Connections: Not on file   Intimate Partner Violence: Not on file   Housing Stability: Not on file       Problems, Past medical history, past surgical history, Medications, allergies, social and family history reviewed and as per the electronic medical record from today's encounter    Review of Systems:  CONST: No fever, chills, fatigue, weight changes  EYES: No loss of vision  ENT: No hearing loss, tinnitus  CV: No chest pain, palpitations  RESP: No dyspnea, shortness of breath, cough  GI: No stool incontinence, nausea, vomiting  : No urinary incontinence  MSK: No joint swelling  SKIN: No rash, no hives  NEURO: No dizziness, weakness, paresthesias  PSYCH: Hx of depression  HEM/LYMPH: No easy bruising or bleeding  All other systems reviewed are negative     Physical Exam:  Vitals: /54   Pulse 58   Resp 18   Ht 1.6 m (5' 3\")   Wt 68 kg (150 lb)   SpO2 98%   BMI 26.57 kg/m²   General: No apparent distress. Alert, appropriate, oriented x 3. Mood and affect normal. Speaking in full sentences.  HENT: Normocephalic, atraumatic. Hearing intact.  Eyes: Extraocular movements grossly intact. Pupils equal and round.   Neck: Supple, trachea midline.  Lungs: Symmetric respiratory excursion on visual exam, nonlabored breathing.  Extremities: No clubbing, cyanosis, or edema noted in arms or legs.  Skin: No rashes, lesions, alopecia noted on back or extremities.   Neck: Reports pain with extension and lateral  bending in either direction (R<L). No tenderness over the spinous processes, cervical paraspinal muscles, or bilateral trapezius muscles. Reports pain to palpation of her right occiput.   Neuro: Alert and appropriate. Motor strength 5/5 throughout bilateral upper extremities. Sensory intact to light touch bilateral upper extremities. Gait within normal limits. Bulk and tone within normal limits.    Laboratory Data:  The following laboratory data were reviewed during this visit:   Lab Results   Component " Value Date    WBC 5.0 03/13/2024    RBC 4.70 03/13/2024    HGB 15.0 03/13/2024    HCT 44.5 03/13/2024     03/13/2024      Lab Results   Component Value Date    INR 1.0 02/03/2023    INR 1.0 09/12/2022    INR 1.0 11/11/2021     Lab Results   Component Value Date    CREATININE 0.83 03/13/2024    HGBA1C 5.6 03/13/2024       Imaging:  The following imaging impressions were reviewed by me during this visit:    -4/20/23 cervical spine MRI shows C3-C4 uncovertebral joint hypertrophy, facet hypertrophy, and mild posterior disc protrusion along the ventral thecal sac with resulting mild canal stenosis and moderate bilateral neural foraminal stenosis. C4-C5 mild uncovertebral joint hypertrophy, facet hypertrophy, and mild posterior disc bulge/pseudo bulge. C5-C6 prominent disc osteophyte complex including disc bulge/pseudo bulge minimally flattening the ventral spinal cord with mild to moderate canal stenosis and mild-to-moderate left neural foraminal narrowing.     I also personally reviewed the images from the above studies myself. These images and my interpretation of them contributed to the management and decision making of the patient's medical plan.    ASSESSMENT:  Ms. Luz Castillo is a 68 y.o. female with right neck and jaw pain that is consistent with:    1. Occipital neuralgia of right side    2. Arthropathy of cervical facet joint    3. Cervicogenic headache        PLAN:    Diagnostics:   - I reviewed her most recent cervical spine MRI (see above for details). No further diagnostics are indicated at this time.    Physical Therapy and Rehabilitation:     - She is planning to start PT soon    Psychologically:  - No needs at this time    Medications  - Recommend trial of Meloxicam 15 mg daily.  Education on this medication provided today.  Side effects reviewed.  She will avoid all other NSAIDs while taking this.    Duration  - Multiple years, with significant worsening 1 month ago    Interventions:  - I  suspect her neck pain and headaches are secondary to occipital neuralgia, with possible contributions from cervical facet arthropathy and cervicogenic headache. I did recommend an ultrasound guided right greater/lesser occipital nerve block for diagnostic and therapeutic purposes but she would like to think about this  - Would consider right cervical mbb's/RFA in the future        Sincerely,  Cesar Sawant MD  [facility] - [place]

## 2024-06-03 ENCOUNTER — APPOINTMENT (OUTPATIENT)
Dept: RADIOLOGY | Facility: HOSPITAL | Age: 69
End: 2024-06-03
Payer: MEDICARE

## 2024-06-03 ENCOUNTER — HOSPITAL ENCOUNTER (EMERGENCY)
Facility: HOSPITAL | Age: 69
Discharge: HOME | End: 2024-06-04
Attending: STUDENT IN AN ORGANIZED HEALTH CARE EDUCATION/TRAINING PROGRAM
Payer: MEDICARE

## 2024-06-03 DIAGNOSIS — M19.90 OSTEOARTHRITIS, UNSPECIFIED OSTEOARTHRITIS TYPE, UNSPECIFIED SITE: ICD-10-CM

## 2024-06-03 DIAGNOSIS — M25.551 PAIN OF RIGHT HIP: Primary | ICD-10-CM

## 2024-06-03 PROCEDURE — 99283 EMERGENCY DEPT VISIT LOW MDM: CPT

## 2024-06-03 PROCEDURE — 73502 X-RAY EXAM HIP UNI 2-3 VIEWS: CPT | Mod: RT

## 2024-06-03 PROCEDURE — 2500000001 HC RX 250 WO HCPCS SELF ADMINISTERED DRUGS (ALT 637 FOR MEDICARE OP): Performed by: STUDENT IN AN ORGANIZED HEALTH CARE EDUCATION/TRAINING PROGRAM

## 2024-06-03 RX ORDER — OXYCODONE HYDROCHLORIDE 5 MG/1
5 TABLET ORAL ONCE
Status: COMPLETED | OUTPATIENT
Start: 2024-06-03 | End: 2024-06-03

## 2024-06-03 RX ADMIN — OXYCODONE HYDROCHLORIDE 5 MG: 5 TABLET ORAL at 23:59

## 2024-06-03 ASSESSMENT — COLUMBIA-SUICIDE SEVERITY RATING SCALE - C-SSRS
1. IN THE PAST MONTH, HAVE YOU WISHED YOU WERE DEAD OR WISHED YOU COULD GO TO SLEEP AND NOT WAKE UP?: NO
2. HAVE YOU ACTUALLY HAD ANY THOUGHTS OF KILLING YOURSELF?: NO
6. HAVE YOU EVER DONE ANYTHING, STARTED TO DO ANYTHING, OR PREPARED TO DO ANYTHING TO END YOUR LIFE?: NO

## 2024-06-03 ASSESSMENT — LIFESTYLE VARIABLES
TOTAL SCORE: 0
HAVE PEOPLE ANNOYED YOU BY CRITICIZING YOUR DRINKING: NO
EVER HAD A DRINK FIRST THING IN THE MORNING TO STEADY YOUR NERVES TO GET RID OF A HANGOVER: NO
HAVE YOU EVER FELT YOU SHOULD CUT DOWN ON YOUR DRINKING: NO
EVER FELT BAD OR GUILTY ABOUT YOUR DRINKING: NO

## 2024-06-03 ASSESSMENT — PAIN - FUNCTIONAL ASSESSMENT: PAIN_FUNCTIONAL_ASSESSMENT: 0-10

## 2024-06-03 ASSESSMENT — PAIN SCALES - GENERAL: PAINLEVEL_OUTOF10: 7

## 2024-06-04 ENCOUNTER — TELEPHONE (OUTPATIENT)
Dept: OTOLARYNGOLOGY | Facility: CLINIC | Age: 69
End: 2024-06-04

## 2024-06-04 ENCOUNTER — OFFICE VISIT (OUTPATIENT)
Dept: OTOLARYNGOLOGY | Facility: CLINIC | Age: 69
End: 2024-06-04
Payer: MEDICARE

## 2024-06-04 VITALS
OXYGEN SATURATION: 97 % | HEART RATE: 52 BPM | RESPIRATION RATE: 18 BRPM | TEMPERATURE: 97.5 F | WEIGHT: 150 LBS | DIASTOLIC BLOOD PRESSURE: 55 MMHG | BODY MASS INDEX: 26.58 KG/M2 | HEIGHT: 63 IN | SYSTOLIC BLOOD PRESSURE: 124 MMHG

## 2024-06-04 VITALS — WEIGHT: 153.6 LBS | TEMPERATURE: 96.9 F | BODY MASS INDEX: 27.21 KG/M2 | HEIGHT: 63 IN

## 2024-06-04 DIAGNOSIS — F17.200 SMOKER: ICD-10-CM

## 2024-06-04 DIAGNOSIS — K13.21 LEUKOPLAKIA, TONGUE: Primary | ICD-10-CM

## 2024-06-04 PROCEDURE — 1159F MED LIST DOCD IN RCRD: CPT | Performed by: OTOLARYNGOLOGY

## 2024-06-04 PROCEDURE — 4004F PT TOBACCO SCREEN RCVD TLK: CPT | Performed by: OTOLARYNGOLOGY

## 2024-06-04 PROCEDURE — 1160F RVW MEDS BY RX/DR IN RCRD: CPT | Performed by: OTOLARYNGOLOGY

## 2024-06-04 PROCEDURE — 73502 X-RAY EXAM HIP UNI 2-3 VIEWS: CPT | Mod: RIGHT SIDE | Performed by: RADIOLOGY

## 2024-06-04 PROCEDURE — 99214 OFFICE O/P EST MOD 30 MIN: CPT | Performed by: OTOLARYNGOLOGY

## 2024-06-04 RX ORDER — HYDROCODONE BITARTRATE AND ACETAMINOPHEN 5; 325 MG/1; MG/1
1 TABLET ORAL EVERY 6 HOURS PRN
Qty: 5 TABLET | Refills: 0 | Status: SHIPPED | OUTPATIENT
Start: 2024-06-04 | End: 2024-06-07

## 2024-06-04 NOTE — ED PROVIDER NOTES
History/Exam limitations: none  HPI was provided by patient    HPI:    Chief Complaint   Patient presents with    Hip Pain        Luz Castillo is a 69 y.o. female presents with chief complaint of hip pain.  Patient's pain is to the right hip.  Began a few hours ago.  She tried taking her meloxicam she takes for her neck pain but has had minor to little improvement.  States she has had pain in her leg before but none to this extent.  Denies any trauma or exacerbating but states she was just sitting and then went to get up and felt the pain in the hip.  Denies any leg swelling recent travel recent trauma recent mobilization.  Pain worse with movement and walking.      ROS: All other review of systems are negative except as noted above and HPI or ROS.   CONSTITUTIONAL: fever, chills  CARDIOVASCULAR: chest pain, palpitations, swelling  RESPIRATORY: cough, wheeze, shortness of breath  MUSCULOSKELETAL: deformity, neck pain,  hip pain  SKIN: rash, color change  NEUROLOGIC: headache, numbness, focal weakness  NOTES: All systems reviewed, negative except as described above       Physical Exam:  GENERAL: Alert, oriented , cooperative,  in no acute distress.  HEAD: normocephalic, atraumatic  SKIN: Intact,  dry skin, no lesions.  PULMONARY: Clear bilaterally. No crackles, rhonchi, wheezing.  No respiratory distress.  No work of breathing.  CARDIAC: Regular rate and rhythm.  Pulses 2+ and radials.  No murmur, rub.  MUSCULOSKELETAL: Full range of motion throughout, no deformity.   Right hip without erythema, swelling, crepitus, pain with active or passive range of motion.    No tenderness palpation.   Compartments soft  NEUROLOGICAL:  no focal neuro deficits.  Exam limited secondary to pain elicited with passive and active movement.  Neurovascular intact distal to injury  PSYCHIATRIC: Appropriate mood and affect. Calm.       MDM/ED COURSE:    The patient presented for evaluation hip pain.  Differential includes but not  limited to fracture, sprain, strain, dislocation.  Imaging studies if performed were independently reviewed and interpreted by myself and confirmed by radiologist. X-ray shows degenerative findings.  Patient was able to ambulate without any problem here in the ED department.  She was given Percocet here for pain with good improvement.  Plan to be discharged home.  She was given a few Norco for breakthrough pain at home.  Advised to continue anti-inflammatories as needed.   The patient  has stable vs and will be discharge home.   The patient is in agreement with the plan and given instructions to follow up with their orthopedist.          I discussed the differential, results and plan with the patient and/or family/friend/caregiver if present.       I emphasized the importance of follow-up with the physician I referred them to in the timeframe recommended.  I explained reasons for the patient to return to the Emergency Department. Additional verbal discharge instructions were also given and discussed with the patient to supplement those generated by the EMR. We also discussed medications that were prescribed (if any) including common side effects and interactions. The patient was advised to abstain from driving, operating heavy machinery or making significant decisions while taking medications such as opiates and muscle relaxers that may impair this. All questions were addressed.  They understand return precautions and discharge instructions. The patient and/or family/friend/caregiver expressed understanding.     Note: This note was dictated by speech recognition. Minor errors in transcription may be present.    Diagnoses as of 06/04/24 2115   Pain of right hip   Osteoarthritis, unspecified osteoarthritis type, unspecified site         Past Medical History:   Diagnosis Date    Alcohol dependence, in remission (Multi) 12/26/2019    History of alcoholism    Alcohol dependence, in remission (Multi) 09/20/2021    History  of alcoholism    Anxiety disorder, unspecified     Anxiety and depression    Chronic viral hepatitis C (Multi) 11/29/2021    Chronic viral hepatitis C    Hepatitis C     Osteopenia     Other long term (current) drug therapy 05/06/2021    High risk medication use    Personal history of diseases of the blood and blood-forming organs and certain disorders involving the immune mechanism     History of autoimmune disorder    Personal history of other diseases of the circulatory system     History of hypertension    Personal history of other diseases of the digestive system 09/20/2021    History of gastroesophageal reflux (GERD)    Personal history of other diseases of the female genital tract     History of endometriosis    Personal history of other endocrine, nutritional and metabolic disease 03/10/2021    History of goiter    Personal history of other infectious and parasitic diseases 03/17/2022    History of hepatitis C virus infection      Social History     Socioeconomic History    Marital status: Single     Spouse name: None    Number of children: None    Years of education: None    Highest education level: None   Occupational History    None   Tobacco Use    Smoking status: Every Day     Current packs/day: 1.00     Types: Cigarettes    Smokeless tobacco: Never   Substance and Sexual Activity    Alcohol use: Never    Drug use: Never    Sexual activity: None   Other Topics Concern    None   Social History Narrative    None     Social Determinants of Health     Financial Resource Strain: Not on file   Food Insecurity: Not on file   Transportation Needs: Not on file   Physical Activity: Not on file   Stress: Not on file   Social Connections: Not on file   Intimate Partner Violence: Not on file   Housing Stability: Not on file     Current Outpatient Medications   Medication Instructions    acetaminophen (TYLENOL) 500 mg, oral, Every 6 hours    amoxicillin (Amoxil) 500 mg capsule Take 4 tablets one time for dental  procedure    aspirin 81 mg EC tablet 1 tablet, oral, Daily    atorvastatin (LIPITOR) 40 mg, oral, Nightly    b complex vitamins capsule 1 capsule, oral, Daily    calcium carbonate 1,200 mg, oral    cholecalciferol (VITAMIN D-3) 50 mcg, oral    folic acid (Folvite) 1 mg tablet 1 tablet, oral, Daily    HYDROcodone-acetaminophen (Norco) 5-325 mg tablet 1 tablet, oral, Every 6 hours PRN    levothyroxine (Synthroid, Levoxyl) 100 mcg tablet Take one tablet by mouth 6 days per week    lisinopril 20 mg, oral, Daily    magnesium glycinate 100 mg magnesium capsule oral    meloxicam (MOBIC) 15 mg, oral, Daily    multivit-min/ferrous gluconate (CENTAMIN ORAL) oral    olopatadine (Pataday) 0.2 % ophthalmic solution ophthalmic (eye), Daily RT    omega 3-dha-epa-fish oil 360 mg-108 mg- 180 mg-1,200 mg capsule 1 capsule, oral, Daily    pantoprazole (PROTONIX) 40 mg, oral, 2 times daily before meals, Take 30 minutes before breakfast and dinner    TURMERIC ORAL 500 mg, oral     Allergies   Allergen Reactions    Atorvastatin Hives and Unknown    Celecoxib Unknown     Comments: heart races    Diphenhydramine Unknown    Iodine Itching and Swelling    Pramipexole Unknown    Rofecoxib Other     tacjycardia             ED Triage Vitals [06/03/24 2343]   Temperature Heart Rate Respirations BP   36.4 °C (97.5 °F) 54 18 155/72      Pulse Ox Temp src Heart Rate Source Patient Position   98 % -- -- --      BP Location FiO2 (%)     -- --               Labs and Imaging  XR hip right with pelvis when performed 2 or 3 views   Final Result   Degenerative changes present. No acute osseous abnormality identified.             MACRO:   None        Signed by: Kandi Mishra 6/4/2024 4:13 AM   Dictation workstation:   [id number]        Labs Reviewed - No data to display        Procedure  Procedures                  Shiraz Cuevas DO  06/04/24 [id number]

## 2024-06-04 NOTE — TELEPHONE ENCOUNTER
L/M that I will contact to schedule within the next 2-3 weeks. Enc to call with further questions.

## 2024-06-04 NOTE — ED TRIAGE NOTES
"Pt presents ambulatory via POV for right hip pain that started at aprox 2100 today. Pt denies any fall/injury, states she has \"a bad hip\" on the right side.   "

## 2024-06-04 NOTE — PROGRESS NOTES
Chief Complaint   Patient presents with    Follow-up     FU TONGUE CHECK     HPI:  Luz Castillo is a 69 y.o. female who presents in follow-up today for continued evaluation of chronic left lateral tongue lesion initially seen and biopsied in May 2022.   She reports no changes.   She reports no change. No sore throat, hoarseness, dysphagia, neck pain, neck mass.  Continues to do well without any symptoms.  She is doing well without any pain. Only occasionally she will notice it when of some hard food touches it or pokes into it.    Bx 5/2022 showed some chronic inflammation and mild atypia. No obvious dysplasia or malignancy. Biopsy per report was adequate but superficial and malignancy cannot be entirely ruled out.  She is aware of this and has elected not to pursue any excision.      She is a smoker. 1 pack a day.   Was in the emergency room last night for right hip pain.    5/2022 Path:  TONGUE, LEFT LATERAL, EXCISION:        CHRONICALLY INFLAMED SQUAMOUS MUCOSA SHOWING PROLIFERATIVE CHANGES             WITH MILD ATYPIA,      HYPERKERATOSIS AND PARAKERATOSIS, SEE   COMMENT.     Comment: The excised tissue is relatively superficial and the lesion is   partially transected at the deep aspect and the base of the lesion is   not entirely represented. A neoplastic process is not excluded. Given   the clinical findings of mass, the current biopsy may not be   representative of the pathologic process. Clinical correlation is   recommended.   PMH:  Past Medical History:   Diagnosis Date    Alcohol dependence, in remission (Multi) 12/26/2019    History of alcoholism    Alcohol dependence, in remission (Multi) 09/20/2021    History of alcoholism    Anxiety disorder, unspecified     Anxiety and depression    Chronic viral hepatitis C (Multi) 11/29/2021    Chronic viral hepatitis C    Hepatitis C     Osteopenia     Other long term (current) drug therapy 05/06/2021    High risk medication use    Personal history of  diseases of the blood and blood-forming organs and certain disorders involving the immune mechanism     History of autoimmune disorder    Personal history of other diseases of the circulatory system     History of hypertension    Personal history of other diseases of the digestive system 09/20/2021    History of gastroesophageal reflux (GERD)    Personal history of other diseases of the female genital tract     History of endometriosis    Personal history of other endocrine, nutritional and metabolic disease 03/10/2021    History of goiter    Personal history of other infectious and parasitic diseases 03/17/2022    History of hepatitis C virus infection     Past Surgical History:   Procedure Laterality Date    MR HEAD ANGIO WO IV CONTRAST  1/12/2023    MR HEAD ANGIO WO IV CONTRAST 1/12/2023 [facility]    MR HEAD ANGIO WO IV CONTRAST  4/19/2018    MR HEAD ANGIO WO IV CONTRAST [facility]    MR NECK ANGIO WO IV CONTRAST  1/12/2023    MR NECK ANGIO WO IV CONTRAST 1/12/2023 [facility]    OTHER SURGICAL HISTORY  11/11/2021    Appendectomy    OTHER SURGICAL HISTORY  11/11/2021    Hernia repair    OTHER SURGICAL HISTORY  11/11/2021    Laparoscopy    OTHER SURGICAL HISTORY  11/11/2021    Shoulder replacement    OTHER SURGICAL HISTORY  11/11/2021    Shoulder arthroscopy    OTHER SURGICAL HISTORY  11/11/2021    Tubal ligation    OTHER SURGICAL HISTORY  11/11/2021    Colonic polypectomy    OTHER SURGICAL HISTORY  11/11/2021    Colonoscopy         Medications:     Current Outpatient Medications:     acetaminophen (Tylenol) 500 mg tablet, Take 1 tablet (500 mg) by mouth every 6 hours., Disp: , Rfl:     amoxicillin (Amoxil) 500 mg capsule, Take 4 tablets one time for dental procedure, Disp: 4 capsule, Rfl: 0    aspirin 81 mg EC tablet, Take 1 tablet (81 mg) by mouth once daily., Disp: , Rfl:     atorvastatin (Lipitor) 40 mg tablet, Take 1 tablet (40 mg) by mouth once daily at bedtime., Disp: 90  tablet, Rfl: 1    b complex vitamins capsule, Take 1 capsule by mouth once daily., Disp: , Rfl:     calcium carbonate 600 mg calcium (1,500 mg) tablet, Take 2 tablets (1,200 mg) by mouth., Disp: , Rfl:     cholecalciferol (Vitamin D-3) 25 MCG (1000 UT) capsule, Take 2 capsules (50 mcg) by mouth., Disp: , Rfl:     folic acid (Folvite) 1 mg tablet, Take 1 tablet (1 mg) by mouth once daily., Disp: , Rfl:     levothyroxine (Synthroid, Levoxyl) 100 mcg tablet, Take one tablet by mouth 6 days per week, Disp: 90 tablet, Rfl: 1    lisinopril 20 mg tablet, Take 1 tablet (20 mg) by mouth once daily., Disp: 90 tablet, Rfl: 1    magnesium glycinate 100 mg magnesium capsule, Take by mouth., Disp: , Rfl:     meloxicam (Mobic) 15 mg tablet, Take 1 tablet (15 mg) by mouth once daily., Disp: 30 tablet, Rfl: 2    multivit-min/ferrous gluconate (CENTAMIN ORAL), Take by mouth., Disp: , Rfl:     olopatadine (Pataday) 0.2 % ophthalmic solution, Administer into affected eye(s) once daily., Disp: , Rfl:     omega 3-dha-epa-fish oil 360 mg-108 mg- 180 mg-1,200 mg capsule, Take 1 capsule by mouth once daily., Disp: , Rfl:     pantoprazole (ProtoNix) 40 mg EC tablet, Take 1 tablet (40 mg) by mouth 2 times a day before meals. Take 30 minutes before breakfast and dinner, Disp: 180 tablet, Rfl: 0    TURMERIC ORAL, Take 500 mg by mouth., Disp: , Rfl:     HYDROcodone-acetaminophen (Norco) 5-325 mg tablet, Take 1 tablet by mouth every 6 hours if needed for severe pain (7 - 10) for up to 3 days., Disp: 5 tablet, Rfl: 0  No current facility-administered medications for this visit.     Allergies:  Allergies   Allergen Reactions    Atorvastatin Hives and Unknown    Celecoxib Unknown     Comments: heart races    Diphenhydramine Unknown    Iodine Itching and Swelling    Pramipexole Unknown    Rofecoxib Other     tacjycardia        ROS:  Review of systems normal unless stated otherwise in the HPI and/or PMH.    Physical Exam:  Temperature 36.1 °C (96.9  "°F), height 1.6 m (5' 3\"), weight 69.7 kg (153 lb 9.6 oz). Body mass index is 27.21 kg/m².     GENERAL APPEARANCE: Well developed and well nourished.  Alert and oriented in no acute distress.  Normal vocal quality.      HEAD/FACE: No erythema or edema or facial tenderness.  Normal facial nerve function bilaterally.    EAR:       EXTERNAL: Normal pinnas and external auditory canals without lesion or obstructing wax.       MIDDLE EAR: Tympanic membranes intact and mobile with normal landmarks.  Middle ear space appears well aerated.       TUBE STATUS: N/A       MASTOID CAVITY: N/A       HEARING: Gross hearing assessment is within normal limits.      NOSE:       VISUALIZED USING: Anterior rhinoscopy with headlight and nasal speculum.       DORSUM: Midline, nontraumatic appearance.       MUCOSA: Normal-appearing.       SECRETIONS: Normal.       SEPTUM: Midline and nonobstructing.       INFERIOR TURBINATES: Normal.       MIDDLE TURBINATES/MEATUS: N/A       BLEEDING: N/A         ORAL CAVITY/PHARYNX:       TEETH: Adequate dentition.       TONGUE: No change in the approximate 2 x 4 cm left lateral tongue plaque-like area of leukoplakia.  Without any palpable depth.  No ulceration.  No tenderness.  Normal mobility.  No change       FLOOR OF MOUTH: No mass or lesion.       PALATE: Normal hard palate, soft palate, and uvula.       OROPHARYNX: Normal without mass or lesion.       BUCCAL MUCOSA/GBS: Normal without mass or lesion.       LIPS: Normal.    LARYNX/HYPOPHARYNX/NASOPHARYNX: N/A    NECK: No palpable masses or abnormal adenopathy.  Trachea is midline.    THYROID: No thyromegaly or palpable nodule.    SALIVARY GLANDS: Normal bilateral parotid and submandibular glands by inspection and palpation.    TMJ's: Normal.    NEURO: Cranial nerve exam grossly normal bilaterally.       Assessment/Plan   Luz was seen today for follow-up.  Diagnoses and all orders for this visit:  Leukoplakia, tongue (Primary)  Smoker     No " changes. She continues to be aware that dysplasia, malignancy, or transformation to either of those can never be ruled out without surgical excision. She continues to not wish to do this.   Is been 2 years since her last biopsy I recommend we go ahead and rebiopsy a few areas that to continue to confirm there is no current malignancy.  She understands and agrees.  We will schedule something in the office within the next month or 2.  Follow up in about 6 months (around 12/4/2024) for Recheck- biopsy prior.     Toy Gallardo MD

## 2024-06-04 NOTE — TELEPHONE ENCOUNTER
----- Message from Toy Gallardo MD sent at 6/4/2024  9:40 AM EDT -----  Regarding: Tongue biopsy  Please call her and put her on the list for a Wednesday morning tongue biopsy in the office.

## 2024-06-05 ENCOUNTER — EVALUATION (OUTPATIENT)
Dept: PHYSICAL THERAPY | Facility: CLINIC | Age: 69
End: 2024-06-05
Payer: MEDICARE

## 2024-06-05 DIAGNOSIS — M54.2 NECK PAIN: Primary | ICD-10-CM

## 2024-06-05 DIAGNOSIS — M62.89 MUSCLE TIGHTNESS: ICD-10-CM

## 2024-06-05 PROCEDURE — 97110 THERAPEUTIC EXERCISES: CPT | Mod: GP | Performed by: PHYSICAL THERAPIST

## 2024-06-05 PROCEDURE — 97161 PT EVAL LOW COMPLEX 20 MIN: CPT | Mod: GP | Performed by: PHYSICAL THERAPIST

## 2024-06-05 ASSESSMENT — ENCOUNTER SYMPTOMS
OCCASIONAL FEELINGS OF UNSTEADINESS: 0
DEPRESSION: 0
LOSS OF SENSATION IN FEET: 0

## 2024-06-05 NOTE — PROGRESS NOTES
Physical Therapy    Physical Therapy Evaluation    Patient Name: Luz Castillo  MRN: [id number]  Today's Date: 6/5/2024  Time Calculation  Start Time: 1015  Stop Time: 1100  Time Calculation (min): 45 min    Assessment: The patient is a 69 y.o. female with chief complaint of pain in right mastoid/occipital and suboccipital area that radiates to her jaw and up around her head causing headaches behind her right eye.  This is constant at low level, but increases with with cervical spine movement.  She presents with forward head and rounded shoulder posture, impaired muscular endurance to maintain good posture, impaired cervical ROM and significant tightness and tenderness in the right upper trapezius muscles, cervical paraspinals and suboccipitals. These physical impairments are negatively impacting her ability to sleep and drive,  She will likely benefit from skilled physical therapy to address these impairments and reduce pain to return to PLOF and improve QOL.        Plan  Treatment/Interventions: Cryotherapy, Education/ Instruction, Electrical stimulation, Hot pack, Manual therapy, Neuromuscular re-education, Self care/ home management, Taping techniques, Therapeutic activities, Therapeutic exercises  PT Plan: Skilled PT  PT Frequency: 2 times per week  Duration: 4-6 weeks  Onset Date: 05/09/24  Certification Period Start Date: 06/05/24  Certification Period End Date: 09/03/24  Number of Treatments Authorized: No Authorization Required  Rehab Potential: Good  Plan of Care Agreement: Patient    Current Problem  1. Neck pain  Referral to Physical Therapy    Follow Up In Physical Therapy      2. Muscle tightness            Subjective   General: Woke up one morning last month (May)  and had pain in bone behind ear and radiates to the jaw and right side of neck.  Some times to comes up over head and gives her a headache.  Started on Meloxicam, which is helpful  If she turns her head too fast, she will feel pain. Saw  "a pain management doctor who prescribed to meloxicam.  Will consider an injection to occipital nerve if therapy isn't helpful.  Unable to take Prednisone.  Is also on hydrocodone , as she has an episode of right hip pain and was in the ER 2 days ago.  Denies N/T in arms or hands. No current xray taken. This has not been the first episode, but this time it just doesn't want to go away. Denies dizziness Goal: get rid of pain.   General  Reason for Referral: Neck pain  Referred By: Dr. Higuera  Past Medical History Relevant to Rehab: HTN, Osteopenia, Thyroid Disorder, Liver Disease, Hepatitis, R/L TSR, Hernia Repair, Cateract removal  Precautions: R/L TSR, denies hx of CA and denies pacemaker, osteopenia, OA and pain in right hip        Pain: 2/10 currently at rest, At worst 5-6/10, feels sharp tinge of pain then goes away.      Home Living: Lives with significant other, independent in all areas, Multi-story home, no problem with stairs.      Prior Function Per Pt/Caregiver Report:        Objective   Shoulder ROM: WFL R/L, limited at end range due to B TSR    Cervical AROM:  Flex: 53 deg  Ext: 52 deg  SB R:25 deg  SB L:22 deg  Rot R:55 deg   Rot L:52 deg    UE/Scapular strength:  Shoulder Flex:4+  Shoulder Ext: 4+  Shoulder Abd: 4  Elbow Flex: 5  Elbow Ext:5  Rhomboids: strong retraction in sitting    Posture: forward head, rounded shoulders, slight dowagers, loss of lordosis.           Flexibility: + tightness in left> right upper traps, cervical paraspinals and suboccipital muscles.      Palpation: +TTP right suboccipitals, cervical paraspinal muscles, C6-7 TP, Upper traps R>L     Special Tests: No change in pain with cervical joint compression, no change in pain with manual traction \"feels good.\"     Gait: WNL, independent               Bed Mobility: independent     Transfers: Independent         Outcome Measures:  Other Measures  Neck Disability Index: 8/50(16%)     OP EDUCATION: Patient performed the following " exercises and was provided with printed handout.  She was educated on importance of maintaining good posture to reduce symptoms.      Access Code: [email or web address]  URL: [email or web address]  Date: 06/05/2024  Prepared by: Griselda Garrett    Exercises  - Seated Cervical Retraction  - 1-2 x daily - 7 x weekly - 1 sets - 10 reps - 5 seconds hold  - Seated Upper Trapezius Stretch  - 1-2 x daily - 7 x weekly - 1 sets - 5 reps - 10 seconds hold  - Seated Scapular Retraction  - 1-2 x daily - 7 x weekly - 1 sets - 10 reps  Outpatient Education  Individual(s) Educated: Patient  Education Provided: Anatomy, Home Exercise Program, POC, Posture  Patient/Caregiver Demonstrated Understanding: yes  Plan of Care Discussed and Agreed Upon: yes  Patient Response to Education: Patient/Caregiver Verbalized Understanding of Information, Patient/Caregiver Performed Return Demonstration of Exercises/Activities  Education Comment: Patient was advised to cease any exercise that increases neck or HA or Jaw pain.  She was instructed to use a heating pad to neck for 10-15 minutes prior to performing the exercises.    Goals:  Active       Neck Pain       Patient will report reduction of neck,occipital and jaw pain by 75% or greater to enhance ability to sleep and perform cervical motion.        Start:  06/05/24    Expected End:  07/17/24            Patient will demonstrate an 8% Improvement on NDI, indicating clinically important difference in function from reduction of neck pain.        Start:  06/05/24    Expected End:  07/17/24            Patient will be independent and adherent to HEP to enhance functional progress and long term management of condition.        Start:  06/05/24    Expected End:  07/17/24            Patient will demonstrate pain-free and full cervical ROM in all planes.       Start:  06/05/24    Expected End:  07/17/24            Patient will demonstrate good working knowledge of proper posture in relation to  symptom management/reduction.        Start:  06/05/24    Expected End:  07/17/24            Patient will demonstrate resolution of tight tender myofascial restrictions in cervical musculature.        Start:  06/05/24    Expected End:  07/17/24

## 2024-06-05 NOTE — LETTER
June 5, 2024    [place]  [id number]   [facility]  [facility] [id number]    Patient: Luz Castillo   YOB: 1955   Date of Visit: 6/5/2024       Dear Mark Higuera DO  [address] [facility] [address]  [place] [address]    The attached plan of care is being sent to you because your patient’s medical reimbursement requires that you certify the plan of care. Your signature is required to allow uninterrupted insurance coverage.      You may indicate your approval by signing below and faxing this form back to us at Dept Fax: [phone].    Please call Dept: [phone] with any questions or concerns.    Thank you for this referral,        Griselda Garrett, PT  [facility] [address] [facility]  [address] [facility] [address]    Payer: Payor: [employer] MEDICARE / Plan: [employer] MEDICARE / Product Type: *No Product type* /                                                                         Date:     Dear Griselda Garrett, PT,     Re: Ms. Luz Castillo, MRN:[id number]    I certify that I have reviewed the attached plan of care and it is medically necessary for Ms. Luz Castillo (1955) who is under my care.          ______________________________________                    _________________  Provider name and credentials                                           Date and time                                                                                           Plan of Care 6/5/24   Effective from: 6/5/2024  Effective to: 9/3/2024    Plan ID: [id number]            Participants as of Finalize on 6/5/2024    Name Type Comments Contact Info    Mark Higuera DO PCP - General  [phone]    Griselda Garrett, PT Physical Therapist  [phone]       Last Plan Note     Author: Griselda Garrett PT Status: Incomplete Last edited: 6/5/2024 10:15 AM       Physical Therapy    Physical Therapy Evaluation    Patient Name: Luz GARAY  Jonathan  MRN: [id number]  Today's Date: 6/5/2024  Time Calculation  Start Time: 1015  Stop Time: 1100  Time Calculation (min): 45 min    Assessment: The patient is a 69 y.o. female with chief complaint of pain in right mastoid/occipital and suboccipital area that radiates to her jaw and up around her head causing headaches behind her right eye.  This is constant at low level, but increases with with cervical spine movement.  She presents with forward head and rounded shoulder posture, impaired muscular endurance to maintain good posture, impaired cervical ROM and significant tightness and tenderness in the right upper trapezius muscles, cervical paraspinals and suboccipitals. These physical impairments are negatively impacting her ability to sleep and drive,  She will likely benefit from skilled physical therapy to address these impairments and reduce pain to return to PLOF and improve QOL.        Plan  Treatment/Interventions: Cryotherapy, Education/ Instruction, Electrical stimulation, Hot pack, Manual therapy, Neuromuscular re-education, Self care/ home management, Taping techniques, Therapeutic activities, Therapeutic exercises  PT Plan: Skilled PT  PT Frequency: 2 times per week  Duration: 4-6 weeks  Onset Date: 05/09/24  Certification Period Start Date: 06/05/24  Certification Period End Date: 09/03/24  Number of Treatments Authorized: No Authorization Required  Rehab Potential: Good  Plan of Care Agreement: Patient    Current Problem  1. Neck pain  Referral to Physical Therapy    Follow Up In Physical Therapy      2. Muscle tightness            Subjective  General: Woke up one morning last month (May)  and had pain in bone behind ear and radiates to the jaw and right side of neck.  Some times to comes up over head and gives her a headache.  Started on Meloxicam, which is helpful  If she turns her head too fast, she will feel pain. Saw a pain management doctor who prescribed to meloxicam.  Will consider an  "injection to occipital nerve if therapy isn't helpful.  Unable to take Prednisone.  Is also on hydrocodone , as she has an episode of right hip pain and was in the ER 2 days ago.  Denies N/T in arms or hands. No current xray taken. This has not been the first episode, but this time it just doesn't want to go away. Denies dizziness Goal: get rid of pain.   General  Reason for Referral: Neck pain  Referred By: Dr. Higuera  Past Medical History Relevant to Rehab: HTN, Osteopenia, Thyroid Disorder, Liver Disease, Hepatitis, R/L TSR, Hernia Repair, Cateract removal  Precautions: R/L TSR, denies hx of CA and denies pacemaker, osteopenia, OA and pain in right hip        Pain: 2/10 currently at rest, At worst 5-6/10, feels sharp tinge of pain then goes away.      Home Living: Lives with significant other, independent in all areas, Multi-story home, no problem with stairs.      Prior Function Per Pt/Caregiver Report:        Objective  Shoulder ROM: WFL R/L, limited at end range due to B TSR    Cervical AROM:  Flex: 53 deg  Ext: 52 deg  SB R:25 deg  SB L:22 deg  Rot R:55 deg   Rot L:52 deg    UE/Scapular strength:  Shoulder Flex:4+  Shoulder Ext: 4+  Shoulder Abd: 4  Elbow Flex: 5  Elbow Ext:5  Rhomboids: strong retraction in sitting    Posture: forward head, rounded shoulders, slight dowagers, loss of lordosis.           Flexibility: + tightness in left> right upper traps, cervical paraspinals and suboccipital muscles.      Palpation: +TTP right suboccipitals, cervical paraspinal muscles, C6-7 TP, Upper traps R>L     Special Tests: No change in pain with cervical joint compression, no change in pain with manual traction \"feels good.\"     Gait: WNL, independent               Bed Mobility: independent     Transfers: Independent         Outcome Measures:  Other Measures  Neck Disability Index: 8/50(16%)     OP EDUCATION: Patient performed the following exercises and was provided with printed handout.  She was educated on " importance of maintaining good posture to reduce symptoms.      Access Code: [email or web address]  URL: [email or web address]  Date: 06/05/2024  Prepared by: Griselda Garrett    Exercises  - Seated Cervical Retraction  - 1-2 x daily - 7 x weekly - 1 sets - 10 reps - 5 seconds hold  - Seated Upper Trapezius Stretch  - 1-2 x daily - 7 x weekly - 1 sets - 5 reps - 10 seconds hold  - Seated Scapular Retraction  - 1-2 x daily - 7 x weekly - 1 sets - 10 reps  Outpatient Education  Individual(s) Educated: Patient  Education Provided: Anatomy, Home Exercise Program, POC, Posture  Patient/Caregiver Demonstrated Understanding: yes  Plan of Care Discussed and Agreed Upon: yes  Patient Response to Education: Patient/Caregiver Verbalized Understanding of Information, Patient/Caregiver Performed Return Demonstration of Exercises/Activities  Education Comment: Patient was advised to cease any exercise that increases neck or HA or Jaw pain.  She was instructed to use a heating pad to neck for 10-15 minutes prior to performing the exercises.    Goals:  Active       Neck Pain       Patient will report reduction of neck,occipital and jaw pain by 75% or greater to enhance ability to sleep and perform cervical motion.        Start:  06/05/24    Expected End:  07/17/24            Patient will demonstrate an 8% Improvement on NDI, indicating clinically important difference in function from reduction of neck pain.        Start:  06/05/24    Expected End:  07/17/24            Patient will be independent and adherent to HEP to enhance functional progress and long term management of condition.        Start:  06/05/24    Expected End:  07/17/24            Patient will demonstrate pain-free and full cervical ROM in all planes.       Start:  06/05/24    Expected End:  07/17/24            Patient will demonstrate good working knowledge of proper posture in relation to symptom management/reduction.        Start:  06/05/24    Expected  End:  07/17/24            Patient will demonstrate resolution of tight tender myofascial restrictions in cervical musculature.        Start:  06/05/24    Expected End:  07/17/24                                Current Participants as of 6/5/2024    Name Type Comments Contact Info    Mark Higuera DO PCP - General  [phone]    Signature pending    Griselda Garrett PT Physical Therapist  [phone]    Signature pending

## 2024-06-08 DIAGNOSIS — K21.9 GASTROESOPHAGEAL REFLUX DISEASE WITHOUT ESOPHAGITIS: ICD-10-CM

## 2024-06-09 RX ORDER — PANTOPRAZOLE SODIUM 40 MG/1
TABLET, DELAYED RELEASE ORAL
Qty: 180 TABLET | Refills: 1 | Status: SHIPPED | OUTPATIENT
Start: 2024-06-09

## 2024-06-10 ENCOUNTER — TREATMENT (OUTPATIENT)
Dept: PHYSICAL THERAPY | Facility: CLINIC | Age: 69
End: 2024-06-10
Payer: MEDICARE

## 2024-06-10 DIAGNOSIS — M54.2 NECK PAIN: ICD-10-CM

## 2024-06-10 PROCEDURE — 97110 THERAPEUTIC EXERCISES: CPT | Mod: GP

## 2024-06-10 NOTE — PROGRESS NOTES
Physical Therapy    Physical Therapy Treatment    Patient Name: Luz Castillo  MRN: [id number]    Today's Date: 6/10/2024  Time Calculation  Start Time: 0845  Stop Time: 0933  Time Calculation (min): 48 min     PT Therapeutic Procedures Time Entry  Manual Therapy Time Entry: 3  Therapeutic Exercise Time Entry: 45    Assessment:  Initially during cervical retraction,  patient reported  increase in R suboccipital pain, but no worse afterwards indicative of Andrea classification of cervical dysfunction.  By the end of session after exercises, the patient reported increase in R suboccipital pain to 5/10 with movement.  Patient advised to apply heat to cervical region if needed when she gets home.  Patient educated in expected  response to ROM exercises, and  advised pain level may increase for several hours today but should return to baseline or less.   She will likely benefit from skilled physical therapy to address impairments and reduce pain to return to PLOF and improve QOL.     Plan:  Assess response to today's session and duration of increased pain and adjust POC as needed.  Continue per POC , progressing as tolerated.      Current Problem  1. Neck pain  Referral to Physical Therapy     Follow Up In Physical Therapy       2. Muscle tightness       General  General  Reason for Referral: Neck pain  Referred By: Dr. Higuera  Past Medical History Relevant to Rehab: HTN, Osteopenia, Thyroid Disorder, Liver Disease, Hepatitis, R/L TSR, Hernia Repair, Cateract removal   Visit 2        Subjective    States pain medication is helping somewhat. Less pain at rest.  Pain  presently rated 3-4/10 right mastoid/occipital and suboccipital area that radiates to her jaw. Slight headache present.     Precautions  R/L TSR, denies hx of CA and denies pacemaker, osteopenia, OA and pain in right hip     Pain   3-4/10 at the start of session with movement  5/10 at the end of session with movement.    Treatments:  Therapeutic  "Exercise  Therapeutic Exercise Performed: Yes  Therapeutic Exercise Activity 1: seated cervical retraction 5' hold x 10 reps  Therapeutic Exercise Activity 2: seated cervical retraction with self overpressure 5\" hold 2 x 10 reps  Therapeutic Exercise Activity 3: seated B shoulder shrugs 1 x 20 reps  Therapeutic Exercise Activity 4: seated B shoulder circles BWD 1 x 20 reps  Therapeutic Exercise Activity 5: supine cervical retraction 5\" hold 2 x 10 reps  Therapeutic Exercise Activity 6: supine B scapular retraction 5\" hold 2 x 10 reps  Therapeutic Exercise Activity 7: supine R/L cervical rotation with self overpressure 5\" hold x 10 reps  Therapeutic Exercise Activity 8: Supine B shoulder horizontal ABD with palms up/down  with orange theraband 2 x 10 reps  Therapeutic Exercise Activity 9: supine B shoulder ER with orange theraband 2 x 10 reps  Therapeutic Exercise Activity 10: second time: seated cervical retraction with self overpressure 5\" hold 2 x 10 reps  Therapeutic Exercise Activity 11: seated R/L levator scapulae stretch 2 x 20\" hold    Manual therapy  TP release to R scalenes, SCM in sitting x 3 mins.    OP EDUCATION:  Access Code: [email or web address]  URL: [email or web address]  Date: 06/05/2024  Prepared by: Griselda Garrett     Exercises  - Seated Cervical Retraction  - 1-2 x daily - 7 x weekly - 1 sets - 10 reps - 5 seconds hold  - Seated Upper Trapezius Stretch  - 1-2 x daily - 7 x weekly - 1 sets - 5 reps - 10 seconds hold  - Seated Scapular Retraction  - 1-2 x daily - 7 x weekly - 1 sets - 10 reps    Reminded patient of the following 6/10/24:  Education Comment: Patient was advised to cease any exercise that increases neck or HA or Jaw pain.  She was instructed to use a heating pad to neck for 10-15 minutes prior to performing the exercises.     Goals:  Active       Neck Pain       Patient will report reduction of neck,occipital and jaw pain by 75% or greater to enhance ability to sleep and " perform cervical motion.        Start:  06/05/24    Expected End:  07/17/24            Patient will demonstrate an 8% Improvement on NDI, indicating clinically important difference in function from reduction of neck pain.        Start:  06/05/24    Expected End:  07/17/24            Patient will be independent and adherent to HEP to enhance functional progress and long term management of condition.        Start:  06/05/24    Expected End:  07/17/24            Patient will demonstrate pain-free and full cervical ROM in all planes.       Start:  06/05/24    Expected End:  07/17/24            Patient will demonstrate good working knowledge of proper posture in relation to symptom management/reduction.        Start:  06/05/24    Expected End:  07/17/24            Patient will demonstrate resolution of tight tender myofascial restrictions in cervical musculature.        Start:  06/05/24    Expected End:  07/17/24

## 2024-06-25 NOTE — PROGRESS NOTES
"Formerly [facility], later [facility]  Follow Up Office Visit Note 2024    Patient Information: Luz Castillo, MRN: [id number], : 1955   Primary Care/Referring Physician: Mark Higuera DO, [id number] [address] [address]     Chief Complaint: Right neck and jaw pain    Interval History:   Ms. Castillo presents today for follow up. At her last visit I started Meloxicam and she was about to start PT    She has since started PT and feels it is providing some relief so far. She reports mild improvement since starting Meloxicam. She denies any side effects from this.     Of note, she states that she is likely getting her right hip replaced soon but is awaiting an MRI     Brief History of Pain: Ms. Luz Castillo is a 69 y.o. female with a PMHx of prior HTN, HLD, GERD, cirrhosis, depression, EtOH use/polysubstance use disorder (3 years sober) who presents for right neck and jaw pain.    She reports issues with neck pain for many years, but it has generally been self limited and improves with conservative care. She states that on 24 this flared up again. She woke up in the morning and had significant pain and stiffness. The pain starts in her right occiput and radiates to her right jaw and occasionally over the top of her right head. She describes the pain as \"annoying\" and it occasionally is sharp. She reports worsening of pain with extension of her neck, and with lateral bending of her head in either direction. She denies any vision or hearing changes when she has headaches. She occasionally has pain radiating to the right shoulder but not further down the arm. She denies any jaw clicking but feels \"crackling\" in her neck    Current Pain Medications: Meloxicam 15 daily  Previously Tried Pain Medications: PO steroids - cause her to be angry, Tylenol - no benefit, Tramadol. Thinks she has tried muscle relaxants in the past, Ibuprofen    Relevant Surgeries: Denies cervical spine surgery. History of " bilateral shoulder replacement  Injections: Denies  Physical/Occupational Therapy: Is planning to start PT for this in a 1 week    Medications:   Current Outpatient Medications   Medication Instructions    acetaminophen (TYLENOL) 500 mg, oral, Every 6 hours    amoxicillin (Amoxil) 500 mg capsule Take 4 tablets one time for dental procedure    aspirin 81 mg EC tablet 1 tablet, oral, Daily    atorvastatin (LIPITOR) 40 mg, oral, Nightly    b complex vitamins capsule 1 capsule, oral, Daily    calcium carbonate 1,200 mg, oral    cholecalciferol (VITAMIN D-3) 50 mcg, oral    folic acid (Folvite) 1 mg tablet 1 tablet, oral, Daily    levothyroxine (Synthroid, Levoxyl) 100 mcg tablet Take one tablet by mouth 6 days per week    lisinopril 20 mg, oral, Daily    magnesium glycinate 100 mg magnesium capsule oral    meloxicam (MOBIC) 15 mg, oral, Daily    multivit-min/ferrous gluconate (CENTAMIN ORAL) oral    olopatadine (Pataday) 0.2 % ophthalmic solution ophthalmic (eye), Daily RT    omega 3-dha-epa-fish oil 360 mg-108 mg- 180 mg-1,200 mg capsule 1 capsule, oral, Daily    pantoprazole (ProtoNix) 40 mg EC tablet TAKE ONE TABLET BY MOUTH TWO TIMES A DAY BEFORE MEALS. TAKE 30 MINUTES BEFORE BREAKFAST AND DINNER.    TURMERIC ORAL 500 mg, oral      Allergies:   Allergies   Allergen Reactions    Atorvastatin Hives and Unknown    Celecoxib Unknown     Comments: heart races    Diphenhydramine Unknown    Iodine Itching and Swelling    Pramipexole Unknown    Rofecoxib Other     tacjycardia       Past Medical & Surgical History:  Past Medical History:   Diagnosis Date    Alcohol dependence, in remission (Multi) 12/26/2019    History of alcoholism    Alcohol dependence, in remission (Multi) 09/20/2021    History of alcoholism    Anxiety disorder, unspecified     Anxiety and depression    Chronic viral hepatitis C (Multi) 11/29/2021    Chronic viral hepatitis C    Hepatitis C     Osteopenia     Other long term (current) drug therapy  05/06/2021    High risk medication use    Personal history of diseases of the blood and blood-forming organs and certain disorders involving the immune mechanism     History of autoimmune disorder    Personal history of other diseases of the circulatory system     History of hypertension    Personal history of other diseases of the digestive system 09/20/2021    History of gastroesophageal reflux (GERD)    Personal history of other diseases of the female genital tract     History of endometriosis    Personal history of other endocrine, nutritional and metabolic disease 03/10/2021    History of goiter    Personal history of other infectious and parasitic diseases 03/17/2022    History of hepatitis C virus infection      Past Surgical History:   Procedure Laterality Date    MR HEAD ANGIO WO IV CONTRAST  1/12/2023    MR HEAD ANGIO WO IV CONTRAST 1/12/2023 [facility]    MR HEAD ANGIO WO IV CONTRAST  4/19/2018    MR HEAD ANGIO WO IV CONTRAST [facility]    MR NECK ANGIO WO IV CONTRAST  1/12/2023    MR NECK ANGIO WO IV CONTRAST 1/12/2023 [facility]    OTHER SURGICAL HISTORY  11/11/2021    Appendectomy    OTHER SURGICAL HISTORY  11/11/2021    Hernia repair    OTHER SURGICAL HISTORY  11/11/2021    Laparoscopy    OTHER SURGICAL HISTORY  11/11/2021    Shoulder replacement    OTHER SURGICAL HISTORY  11/11/2021    Shoulder arthroscopy    OTHER SURGICAL HISTORY  11/11/2021    Tubal ligation    OTHER SURGICAL HISTORY  11/11/2021    Colonic polypectomy    OTHER SURGICAL HISTORY  11/11/2021    Colonoscopy       Family History   Problem Relation Name Age of Onset    Heart disease Father      Diabetes Father       Social History     Socioeconomic History    Marital status: Single     Spouse name: Not on file    Number of children: Not on file    Years of education: Not on file    Highest education level: Not on file   Occupational History    Not on file   Tobacco Use    Smoking status: Every Day      "Current packs/day: 1.00     Types: Cigarettes    Smokeless tobacco: Never   Substance and Sexual Activity    Alcohol use: Never    Drug use: Never    Sexual activity: Not on file   Other Topics Concern    Not on file   Social History Narrative    Not on file     Social Determinants of Health     Financial Resource Strain: Not on file   Food Insecurity: Not on file   Transportation Needs: Not on file   Physical Activity: Not on file   Stress: Not on file   Social Connections: Not on file   Intimate Partner Violence: Not on file   Housing Stability: Not on file       Problems, Past medical history, past surgical history, Medications, allergies, social and family history reviewed and as per the electronic medical record from today's encounter    Review of Systems:  CONST: No fever, chills, fatigue, weight changes  EYES: No loss of vision  ENT: No hearing loss, tinnitus  CV: No chest pain, palpitations  RESP: No dyspnea, shortness of breath, cough  GI: No stool incontinence, nausea, vomiting  : No urinary incontinence  MSK: No joint swelling  SKIN: No rash, no hives  NEURO: No dizziness, weakness, paresthesias  PSYCH: Hx of depression  HEM/LYMPH: No easy bruising or bleeding  All other systems reviewed are negative     Physical Exam:  Vitals: /82   Pulse 86   Resp 18   Ht 1.6 m (5' 3\")   Wt 69.4 kg (153 lb)   SpO2 98%   BMI 27.10 kg/m²   General: No apparent distress. Alert, appropriate, oriented x 3. Mood and affect normal. Speaking in full sentences.  HENT: Normocephalic, atraumatic. Hearing intact.  Eyes: Extraocular movements grossly intact. Pupils equal and round.   Neck: Supple, trachea midline.  Lungs: Symmetric respiratory excursion on visual exam, nonlabored breathing.  Extremities: No clubbing, cyanosis, or edema noted in arms or legs.  Skin: No rashes, lesions, alopecia noted on back or extremities.   Neuro: Alert and appropriate. Gait within normal limits. Bulk and tone within normal " limits.    Laboratory Data:  The following laboratory data were reviewed during this visit:   Lab Results   Component Value Date    WBC 5.0 03/13/2024    RBC 4.70 03/13/2024    HGB 15.0 03/13/2024    HCT 44.5 03/13/2024     03/13/2024      Lab Results   Component Value Date    INR 1.0 02/03/2023    INR 1.0 09/12/2022    INR 1.0 11/11/2021     Lab Results   Component Value Date    CREATININE 0.83 03/13/2024    HGBA1C 5.6 03/13/2024       Imaging:  The following imaging impressions were reviewed by me during this visit:    -4/20/23 cervical spine MRI shows C3-C4 uncovertebral joint hypertrophy, facet hypertrophy, and mild posterior disc protrusion along the ventral thecal sac with resulting mild canal stenosis and moderate bilateral neural foraminal stenosis. C4-C5 mild uncovertebral joint hypertrophy, facet hypertrophy, and mild posterior disc bulge/pseudo bulge. C5-C6 prominent disc osteophyte complex including disc bulge/pseudo bulge minimally flattening the ventral spinal cord with mild to moderate canal stenosis and mild-to-moderate left neural foraminal narrowing.     I also personally reviewed the images from the above studies myself. These images and my interpretation of them contributed to the management and decision making of the patient's medical plan.    ASSESSMENT:  Ms. Luz Castillo is a 69 y.o. female with right neck and jaw pain that is consistent with:    1. Occipital neuralgia of right side    2. Arthropathy of cervical facet joint    3. Cervicogenic headache          PLAN:    Diagnostics:   - I reviewed her most recent cervical spine MRI (see above for details). No further diagnostics are indicated at this time.    Physical Therapy and Rehabilitation:     - Continue current PT    Psychologically:  - No needs at this time    Medications  - Continue Meloxicam 15 mg daily.  Refills provided today    Duration  - Multiple years, with significant worsening 1 month ago    Interventions:  - I  suspect her neck pain and headaches are secondary to occipital neuralgia, with possible contributions from cervical facet arthropathy and cervicogenic headache. I did recommend an ultrasound guided right greater/lesser occipital nerve block for diagnostic and therapeutic purposes but she would like to think about this  - Would consider right cervical mbb's/RFA in the future        Sincerely,  Cesar Sawant MD  [facility] [place]

## 2024-06-26 ENCOUNTER — OFFICE VISIT (OUTPATIENT)
Dept: PAIN MEDICINE | Facility: CLINIC | Age: 69
End: 2024-06-26
Payer: MEDICARE

## 2024-06-26 VITALS
DIASTOLIC BLOOD PRESSURE: 82 MMHG | SYSTOLIC BLOOD PRESSURE: 126 MMHG | RESPIRATION RATE: 18 BRPM | BODY MASS INDEX: 27.11 KG/M2 | WEIGHT: 153 LBS | HEART RATE: 86 BPM | OXYGEN SATURATION: 98 % | HEIGHT: 63 IN

## 2024-06-26 DIAGNOSIS — G44.86 CERVICOGENIC HEADACHE: ICD-10-CM

## 2024-06-26 DIAGNOSIS — M54.81 OCCIPITAL NEURALGIA OF RIGHT SIDE: Primary | ICD-10-CM

## 2024-06-26 DIAGNOSIS — M47.812 ARTHROPATHY OF CERVICAL FACET JOINT: ICD-10-CM

## 2024-06-26 PROCEDURE — 99214 OFFICE O/P EST MOD 30 MIN: CPT | Performed by: STUDENT IN AN ORGANIZED HEALTH CARE EDUCATION/TRAINING PROGRAM

## 2024-06-26 PROCEDURE — 1160F RVW MEDS BY RX/DR IN RCRD: CPT | Performed by: STUDENT IN AN ORGANIZED HEALTH CARE EDUCATION/TRAINING PROGRAM

## 2024-06-26 PROCEDURE — 4004F PT TOBACCO SCREEN RCVD TLK: CPT | Performed by: STUDENT IN AN ORGANIZED HEALTH CARE EDUCATION/TRAINING PROGRAM

## 2024-06-26 PROCEDURE — 1125F AMNT PAIN NOTED PAIN PRSNT: CPT | Performed by: STUDENT IN AN ORGANIZED HEALTH CARE EDUCATION/TRAINING PROGRAM

## 2024-06-26 PROCEDURE — 1159F MED LIST DOCD IN RCRD: CPT | Performed by: STUDENT IN AN ORGANIZED HEALTH CARE EDUCATION/TRAINING PROGRAM

## 2024-06-26 PROCEDURE — 3079F DIAST BP 80-89 MM HG: CPT | Performed by: STUDENT IN AN ORGANIZED HEALTH CARE EDUCATION/TRAINING PROGRAM

## 2024-06-26 PROCEDURE — 3074F SYST BP LT 130 MM HG: CPT | Performed by: STUDENT IN AN ORGANIZED HEALTH CARE EDUCATION/TRAINING PROGRAM

## 2024-06-26 RX ORDER — MELOXICAM 15 MG/1
15 TABLET ORAL DAILY
Qty: 30 TABLET | Refills: 2 | Status: SHIPPED | OUTPATIENT
Start: 2024-06-26 | End: 2024-09-24

## 2024-06-26 ASSESSMENT — PATIENT HEALTH QUESTIONNAIRE - PHQ9
1. LITTLE INTEREST OR PLEASURE IN DOING THINGS: NOT AT ALL
2. FEELING DOWN, DEPRESSED OR HOPELESS: NOT AT ALL
SUM OF ALL RESPONSES TO PHQ9 QUESTIONS 1 AND 2: 0

## 2024-06-26 ASSESSMENT — PAIN DESCRIPTION - DESCRIPTORS: DESCRIPTORS: ACHING

## 2024-06-26 ASSESSMENT — PAIN - FUNCTIONAL ASSESSMENT: PAIN_FUNCTIONAL_ASSESSMENT: 0-10

## 2024-06-26 ASSESSMENT — PAIN SCALES - GENERAL
PAINLEVEL: 2
PAINLEVEL_OUTOF10: 2

## 2024-07-02 ENCOUNTER — TREATMENT (OUTPATIENT)
Dept: PHYSICAL THERAPY | Facility: CLINIC | Age: 69
End: 2024-07-02
Payer: MEDICARE

## 2024-07-02 DIAGNOSIS — M62.89 MUSCLE TIGHTNESS: Primary | ICD-10-CM

## 2024-07-02 DIAGNOSIS — M54.2 NECK PAIN: ICD-10-CM

## 2024-07-02 PROCEDURE — 97140 MANUAL THERAPY 1/> REGIONS: CPT | Mod: GP | Performed by: PHYSICAL THERAPIST

## 2024-07-02 PROCEDURE — 97110 THERAPEUTIC EXERCISES: CPT | Mod: GP | Performed by: PHYSICAL THERAPIST

## 2024-07-02 NOTE — PROGRESS NOTES
Physical Therapy    Physical Therapy Treatment    Patient Name: Luz Castillo  MRN: [id number]    Today's Date: 7/2/2024  Time Calculation  Start Time: 0916  Stop Time: 0956  Time Calculation (min): 40 min     PT Therapeutic Procedures Time Entry  Manual Therapy Time Entry: 8  Therapeutic Exercise Time Entry: 32                   Assessment: The patient tolerated the treatment well, with slight left shoulder discomfort with band exercises, which improved with modifications as noted.  She is reporting significant reduction of pain in neck and in jaw, despite decreased adherence with her home program.  She continues to have muscle tightness and tenderness in shoulder and neck muscles and will continue to benefit from stretching and manual therapy to reduce.     Plan: Continue per POC, progress as tolerated.        Current Problem  1. Muscle tightness        2. Neck pain  Follow Up In Physical Therapy          General:  General  Reason for Referral: Neck pain  Referred By: Dr. Higuera  Past Medical History Relevant to Rehab: HTN, Osteopenia, Thyroid Disorder, Liver Disease, Hepatitis, R/L TSR, Hernia Repair, Cateract removal   Visit 3          Subjective  Patient states that she had a follow up with the pain management doctor.  She feels she wants to continue with PT before considering an injection.  Did not take Meloxicam today due to problems with constipation.  Neck pain is feeling much better and jaw pain is nearly gone.  Has been difficult to keep up with cervical retractions every 2 hours.   Has not her HEP in about 1-2 weeks. Will see surgeon in September for her hip.    Precautions: R/L TSR, denies hx of CA and denies pacemaker, osteopenia, OA and pain in right hip         Pain: 0/10 at beginning of session.  1-2/10 only with movement of the neck at end of session.       Objective   Treatments:  Therapeutic Exercise Performed: Yes x 32 minutes  Therapeutic Exercise Activity 1: seated cervical retraction 5' hold  "x 10 reps  Therapeutic Exercise Activity 2: seated cervical retraction with self overpressure 5\" hold 1 x 10 reps  Therapeutic Exercise Activity 3: seated B shoulder shrugs 1 x 20 reps  Therapeutic Exercise Activity 4: seated B shoulder circles BWD 1 x 20 reps  Therapeutic Exercise Activity 5: supine cervical retraction 5\" hold 1 x 10 reps  Therapeutic Exercise Activity 6: supine B scapular retraction 5\" hold 2 x 10 reps  Therapeutic Exercise Activity 7: supine R/L cervical rotation with gentle self overpressure 5\" hold x 10 reps  Therapeutic Exercise Activity 8: Supine B shoulder horizontal ABD with palms up/down  with orange theraband 2 x 10 reps  Therapeutic Exercise Activity 9: supine B shoulder ER with orange theraband 2 x 10 reps (holding left still for isometric due to pain with movement)  Therapeutic Exercise Activity 10: second time: seated cervical retraction with self overpressure 5\" hold 2 x 10 reps  Therapeutic Exercise Activity 11: seated R/L levator scapulae stretch 2 x 20\" hold      Manual therapy: x 8 minutes  STM to R/L upper trapezius, levator scapulae, cervical paraspinals, patient was seated, used fragrance-free massage lotion.     OP EDUCATION: continue with current HEP, with more regular performance of cervical retractions as prescribed by previous therapist.        Goals:  Active       Neck Pain       Patient will report reduction of neck,occipital and jaw pain by 75% or greater to enhance ability to sleep and perform cervical motion.        Start:  06/05/24    Expected End:  07/17/24            Patient will demonstrate an 8% Improvement on NDI, indicating clinically important difference in function from reduction of neck pain.        Start:  06/05/24    Expected End:  07/17/24            Patient will be independent and adherent to HEP to enhance functional progress and long term management of condition.        Start:  06/05/24    Expected End:  07/17/24            Patient will demonstrate " pain-free and full cervical ROM in all planes.       Start:  06/05/24    Expected End:  07/17/24            Patient will demonstrate good working knowledge of proper posture in relation to symptom management/reduction.        Start:  06/05/24    Expected End:  07/17/24            Patient will demonstrate resolution of tight tender myofascial restrictions in cervical musculature.        Start:  06/05/24    Expected End:  07/17/24

## 2024-07-08 ENCOUNTER — APPOINTMENT (OUTPATIENT)
Dept: PHYSICAL THERAPY | Facility: CLINIC | Age: 69
End: 2024-07-08
Payer: MEDICARE

## 2024-07-10 ENCOUNTER — APPOINTMENT (OUTPATIENT)
Dept: OTOLARYNGOLOGY | Facility: CLINIC | Age: 69
End: 2024-07-10
Payer: MEDICARE

## 2024-07-10 VITALS — BODY MASS INDEX: 27.11 KG/M2 | HEIGHT: 63 IN | WEIGHT: 153 LBS

## 2024-07-10 DIAGNOSIS — K13.21 LEUKOPLAKIA, TONGUE: Primary | ICD-10-CM

## 2024-07-10 PROCEDURE — 41100 BIOPSY OF TONGUE: CPT | Performed by: OTOLARYNGOLOGY

## 2024-07-10 PROCEDURE — 88305 TISSUE EXAM BY PATHOLOGIST: CPT

## 2024-07-10 NOTE — PROGRESS NOTES
Luz Castillo is a 69 y.o. female who presents in follow-up today for continued evaluation of chronic left lateral tongue lesion initially seen and biopsied in May 2022.   She reports no changes.   She reports no change. No sore throat, hoarseness, dysphagia, neck pain, neck mass.  Continues to do well without any symptoms.  She is doing well without any pain. Only occasionally she will notice it when of some hard food touches it or pokes into it.    Bx 5/2022 showed some chronic inflammation and mild atypia. No obvious dysplasia or malignancy. Biopsy per report was adequate but superficial and malignancy cannot be entirely ruled out.  She is aware of this and has elected not to pursue any excision.      She is a smoker. 1 pack a day.   Was in the emergency room last night for right hip pain.     5/2022 Path:  TONGUE, LEFT LATERAL, EXCISION:        CHRONICALLY INFLAMED SQUAMOUS MUCOSA SHOWING PROLIFERATIVE CHANGES             WITH MILD ATYPIA,      HYPERKERATOSIS AND PARAKERATOSIS, SEE   COMMENT.     Comment: The excised tissue is relatively superficial and the lesion is   partially transected at the deep aspect and the base of the lesion is   not entirely represented. A neoplastic process is not excluded. Given   the clinical findings of mass, the current biopsy may not be   representative of the pathologic process. Clinical correlation is   recommended.

## 2024-07-10 NOTE — PROGRESS NOTES
Luz Castillo is a 69 y.o. female who presents today for left lateral tongue biopsy.    She has a history of a chronic left lateral tongue lesion initially seen and biopsied in May 2022.   She reports no changes.   She reports no change. No sore throat, hoarseness, dysphagia, neck pain, neck mass.  Continues to do well without any symptoms.  She is doing well without any pain. Only occasionally she will notice it when of some hard food touches it or pokes into it.    Bx 5/2022 showed some chronic inflammation and mild atypia. No obvious dysplasia or malignancy. Biopsy per report was adequate but superficial and malignancy cannot be entirely ruled out.  She is aware of this and has elected not to pursue any excision.      She is a smoker. 1 pack a day.      5/2022 Path:  TONGUE, LEFT LATERAL, EXCISION:        CHRONICALLY INFLAMED SQUAMOUS MUCOSA SHOWING PROLIFERATIVE CHANGES             WITH MILD ATYPIA,      HYPERKERATOSIS AND PARAKERATOSIS, SEE   COMMENT.     Comment: The excised tissue is relatively superficial and the lesion is   partially transected at the deep aspect and the base of the lesion is   not entirely represented. A neoplastic process is not excluded. Given   the clinical findings of mass, the current biopsy may not be   representative of the pathologic process. Clinical correlation is   recommended.     Procedure:  Diagnosis of left lateral tongue lesion/leukoplakia  Procedure biopsy of left lateral tongue lesion, x 2  Local anesthesia with 1% Xylocaine with epinephrine  Specimens were sent, 2 samples from different areas of the same lesion sent in 1 specimen cup.  No significant bleeding  No complication  She is aware the procedure including the risk such as bleeding and infection.  She does give consent.  I anesthetized the left 8 lateral tongue with 1% Xylocaine with epinephrine.  After period of 5 minutes use a large punch biopsy to create 2 large punch incisions in the left lateral tongue  lesion.  1 was more anterior 1 is more mid to posterior of the lesion.  Both specimens were then removed with pickups and scissors and placed in the same specimen cup.  Care was taken to go deeper down to the muscular layer.  Both areas needed closure with suture and I placed a 3-0 chromic horizontal mattress suture in each of the lesions to close it.  No significant bleeding.    Instructions were given.  Follow-up in 2 weeks.

## 2024-07-12 ENCOUNTER — TREATMENT (OUTPATIENT)
Dept: PHYSICAL THERAPY | Facility: CLINIC | Age: 69
End: 2024-07-12
Payer: MEDICARE

## 2024-07-12 DIAGNOSIS — M54.2 NECK PAIN: ICD-10-CM

## 2024-07-12 PROCEDURE — 97110 THERAPEUTIC EXERCISES: CPT | Mod: GP | Performed by: PHYSICAL THERAPIST

## 2024-07-12 PROCEDURE — 97140 MANUAL THERAPY 1/> REGIONS: CPT | Mod: GP | Performed by: PHYSICAL THERAPIST

## 2024-07-12 NOTE — PROGRESS NOTES
"Physical Therapy    Physical Therapy Treatment    Patient Name: Luz Castillo  MRN: [id number]    Today's Date: 7/12/2024  Time Calculation  Start Time: 0930  Stop Time: 1013  Time Calculation (min): 43 min     PT Therapeutic Procedures Time Entry  Manual Therapy Time Entry: 10  Therapeutic Exercise Time Entry: 33                 Onset Date:5/9/24  Insurance: No Auth  Visit Number: 4  Start/Stop Time:  9:30-10:13    Assessment: Patient continues to be non-adherent with HEP, but reports improved neck and jaw pain.  She reported no pain throughout the session.  She had a palpable trigger point in right UT that loosened somewhat with manual therapy.  She left in no distress.  She will continue to benefit from therapy to address physical impairments and pain identified on the initial evaluation.      Plan: Continue with POC.  Consider early recheck next Thursday with evaluating therapist if symptoms of neck and jaw pain remain at current level.         Current Problem  1. Neck pain  Follow Up In Physical Therapy          General:  General  Reason for Referral: Neck pain  Referred By: Dr. Higuera  Past Medical History Relevant to Rehab: HTN, Osteopenia, Thyroid Disorder, Liver Disease, Hepatitis, R/L TSR, Hernia Repair, Cateract removal   Visit 4          Subjective  Had biopsy of tongue on Wednesday is having pain and swelling.  Has not been doing her exercises, but notes that her neck and jaw pain have not been bothering her much. She can't remember the last time she had jaw pain. She felt fine after last session.     Precautions: R/L TSR, denies hx of CA and denies pacemaker, osteopenia, OA and pain in right hip         Pain: 0/10        Objective   Treatments:  Therapeutic Exercise Performed: Yes x  33 minutes  Therapeutic Exercise Activity 1: seated cervical retraction 5' hold x 10 reps  Therapeutic Exercise Activity 2: seated cervical retraction with self overpressure 5\" hold 1 x 10 reps  Therapeutic Exercise " "Activity 3: seated B shoulder shrugs 1 x 20 reps  Therapeutic Exercise Activity 4: seated B shoulder circles BWD 1 x 20 reps  Therapeutic Exercise Activity 5: supine cervical retraction 5\" hold 1 x 10 reps  Therapeutic Exercise Activity 6: supine B scapular retraction 5\" hold 2 x 10 reps  Therapeutic Exercise Activity 7: supine R/L cervical rotation with gentle self overpressure 5\" hold x 10 reps  Therapeutic Exercise Activity 8: Supine B shoulder horizontal ABD with palms up/down  with orange theraband 2 x 10 reps  Therapeutic Exercise Activity 9: supine B shoulder ER with orange theraband 2 x 10 reps (holding left still for isometric due to pain with movement)  Therapeutic Exercise Activity 10: second time: seated cervical retraction with self overpressure 5\" hold 2 x 10 reps  Therapeutic Exercise Activity 11: seated R/L levator scapulae stretch 2 x 20\" hold   -Standing row with orange tband 2 x 10     Manual therapy: x 10 minutes  STM to R/L upper trapezius, levator scapulae, cervical paraspinals, patient was seated, used fragrance-free massage lotion.     OP EDUCATION: Importance of adherence with HEP for long-term relief.        Goals:  Active       Neck Pain       Patient will report reduction of neck,occipital and jaw pain by 75% or greater to enhance ability to sleep and perform cervical motion.        Start:  06/05/24    Expected End:  07/17/24            Patient will demonstrate an 8% Improvement on NDI, indicating clinically important difference in function from reduction of neck pain.        Start:  06/05/24    Expected End:  07/17/24            Patient will be independent and adherent to HEP to enhance functional progress and long term management of condition.        Start:  06/05/24    Expected End:  07/17/24            Patient will demonstrate pain-free and full cervical ROM in all planes.       Start:  06/05/24    Expected End:  07/17/24            Patient will demonstrate good working knowledge of " proper posture in relation to symptom management/reduction.        Start:  06/05/24    Expected End:  07/17/24            Patient will demonstrate resolution of tight tender myofascial restrictions in cervical musculature.        Start:  06/05/24    Expected End:  07/17/24

## 2024-07-15 ENCOUNTER — TREATMENT (OUTPATIENT)
Dept: PHYSICAL THERAPY | Facility: CLINIC | Age: 69
End: 2024-07-15
Payer: MEDICARE

## 2024-07-15 ENCOUNTER — TELEPHONE (OUTPATIENT)
Dept: OTOLARYNGOLOGY | Facility: CLINIC | Age: 69
End: 2024-07-15

## 2024-07-15 DIAGNOSIS — M54.2 NECK PAIN: ICD-10-CM

## 2024-07-15 PROCEDURE — 97140 MANUAL THERAPY 1/> REGIONS: CPT | Mod: GP

## 2024-07-15 PROCEDURE — 97110 THERAPEUTIC EXERCISES: CPT | Mod: GP

## 2024-07-15 NOTE — PROGRESS NOTES
Physical Therapy    Physical Therapy Treatment    Patient Name: Luz Castillo  MRN: [id number]    Today's Date: 7/15/2024  Time Calculation  Start Time: 0845  Stop Time: 0929  Time Calculation (min): 44 min     PT Therapeutic Procedures Time Entry  Manual Therapy Time Entry: 10  Therapeutic Exercise Time Entry: 34                   Assessment:  Patient reports tongue and jaw pain and minimal neck pain today.  States she plans to contact MD today regarding tongue pain and swelling.  No reports of increased pain during or at the end of session.  Again, she had a palpable trigger point in right UT and muscle tightness R>L that loosened somewhat with manual therapy.  She left in no distress.  She will continue to benefit from therapy to address physical impairments and pain identified on the initial evaluation.      Plan:  Continue with POC.  Consider early recheck next Thursday with evaluating therapist if symptoms of neck and jaw pain remain at current level.            Current Problem  1. Neck pain  Follow Up In Physical Therapy          General  General  Reason for Referral: Neck pain  Referred By: Dr. Higuera  Past Medical History Relevant to Rehab: HTN, Osteopenia, Thyroid Disorder, Liver Disease, Hepatitis, R/L TSR, Hernia Repair, Cateract removal   Visit 5    Subjective    Patient reports she had a biopsy of tongue last week.  Reports pain and swelling left side of tongue, along with left ear and jaw.  Reports a lot of stress yesterday. Today has 1-2/10 pain right neck.  Denies shoulder pain.  No problems reported following last session.     Precautions   R/L TSR, denies hx of CA and denies pacemaker, osteopenia, OA and pain in right hip     Pain  1-2/10 at the start of session.  Better at the end of session.     Treatments:  Therapeutic Exercise  Therapeutic Exercise Performed: Yes  Therapeutic Exercise Activity 1: seated cervical retraction 5' hold x 10 reps  Therapeutic Exercise Activity 2: seated cervical  "retraction with self overpressure 5\" hold 2 x 10 reps  Therapeutic Exercise Activity 3: seated cervical extension 2 x 10 reps  Therapeutic Exercise Activity 4: seated B shoulder shrugs 1 x 20 reps  Therapeutic Exercise Activity 5: seated B circles BWD x 20 reps  Therapeutic Exercise Activity 6: supine cervical retraction 5\" hold 2 x 10 reps  Therapeutic Exercise Activity 7: supine B scapular retraction 5\" hold 2 x 10 reps  Therapeutic Exercise Activity 8: Supine B shoulder horizontal ABD with palms up/down  with orange theraband 2 x 10 reps  Therapeutic Exercise Activity 9: supine R>L  shoulder ER with orange theraband 1 x 12  reps  Therapeutic Exercise Activity 10: second time: seated cervical retraction with self overpressure 5\" hold 1  x 10 reps  Therapeutic Exercise Activity 11: seated R/L levator scapulae stretch 3 x 20\" hold     Manual therapy: x 10 minutes  STM to R/L scalenes, upper trapezius, levator scapulae, cervical paraspinals, patient was seated, used fragrance-free massage lotion.     OP EDUCATION:  Reviewed importance of adherence with HEP for long-term relief.        Goals:  Active       Neck Pain       Patient will report reduction of neck,occipital and jaw pain by 75% or greater to enhance ability to sleep and perform cervical motion.        Start:  06/05/24    Expected End:  07/17/24            Patient will demonstrate an 8% Improvement on NDI, indicating clinically important difference in function from reduction of neck pain.        Start:  06/05/24    Expected End:  07/17/24            Patient will be independent and adherent to HEP to enhance functional progress and long term management of condition.        Start:  06/05/24    Expected End:  07/17/24            Patient will demonstrate pain-free and full cervical ROM in all planes.       Start:  06/05/24    Expected End:  07/17/24            Patient will demonstrate good working knowledge of proper posture in relation to symptom " management/reduction.        Start:  06/05/24    Expected End:  07/17/24            Patient will demonstrate resolution of tight tender myofascial restrictions in cervical musculature.        Start:  06/05/24    Expected End:  07/17/24

## 2024-07-15 NOTE — TELEPHONE ENCOUNTER
Pt called and said that the back of her tongue started to hurt on and off yesterday. No other problems just if she moves it a certain way there is pain. It does not last long. It is just a quick stab of pain.  I told her to keep her follow up for next week and take tylenol. Watch how she is moving her tongue.

## 2024-07-17 LAB
LABORATORY COMMENT REPORT: NORMAL
PATH REPORT.FINAL DX SPEC: NORMAL
PATH REPORT.GROSS SPEC: NORMAL
PATH REPORT.RELEVANT HX SPEC: NORMAL
PATH REPORT.TOTAL CANCER: NORMAL

## 2024-07-18 ENCOUNTER — TREATMENT (OUTPATIENT)
Dept: PHYSICAL THERAPY | Facility: CLINIC | Age: 69
End: 2024-07-18
Payer: MEDICARE

## 2024-07-18 DIAGNOSIS — M54.2 NECK PAIN: ICD-10-CM

## 2024-07-18 PROCEDURE — 97110 THERAPEUTIC EXERCISES: CPT | Mod: GP | Performed by: PHYSICAL THERAPIST

## 2024-07-18 PROCEDURE — 97140 MANUAL THERAPY 1/> REGIONS: CPT | Mod: GP | Performed by: PHYSICAL THERAPIST

## 2024-07-18 PROCEDURE — 97530 THERAPEUTIC ACTIVITIES: CPT | Mod: GP,59 | Performed by: PHYSICAL THERAPIST

## 2024-07-18 NOTE — PROGRESS NOTES
Physical Therapy    Physical Therapy Treatment/Recheck    Patient Name: Luz Castillo  MRN: [id number]  Today's Date: 7/18/2024    Time Entry:   Time Calculation  Start Time: 0900  Stop Time: 0950  Time Calculation (min): 50 min     PT Therapeutic Procedures Time Entry  Manual Therapy Time Entry: 15  Therapeutic Exercise Time Entry: 10  Therapeutic Activity Time Entry: 25                 Onset Date: 5/9/24  Insurance: No Auth Required  Visit Number:6  Start/Stop Time:  09:00-9:50    Assessment: The patient has completed 6 visits of physical therapy focusing on cervical ROM, stretching, extension-biased exercises and postural muscle strengthening and postural education to address neck and right tate pain.  She demonstrated improved sidebending ROM and reports significant reduction of pain in neck and right jaw.  She scored 2 points better on the NDI survey. She continues to require cueing for good posture, although she has better awareness.  She has persistent trigger points in upper traps. She lacks adherence to her home program, but despite this she has very little pain, over 75% improved.  She will benefit from completion of 2 more schedule visits, focusing on these residual impairments and then discharge to home program.     Plan: Complete 2 visits, focusing on remaining impariments and finalizing home program.       Current Problem  1. Neck pain  Follow Up In Physical Therapy          General:  Reason for Referral: Neck pain  Referred By: Dr. Higuera  Past Medical History Relevant to Rehab: HTN, Osteopenia, Thyroid Disorder, Liver Disease, Hepatitis, R/L TSR, Hernia Repair, Cateract removal   Visit 6          Subjective  Her right jaw has not bothered grace, and her neck only bothers her if she turns the wrong way. No HA's.    Precautions:  R/L TSR, denies hx of CA and denies pacemaker, osteopenia, OA and pain in right hip         Pain: very slight pain in neck.       Objective     Treatments:  Therapeutic  Activity: Reassessment measures, completion of NDI survey,   Cervical AROM:  Flex: 55  Ext: 52  SB R: 32  SB L: 36  Rot R:56  Rot L:55    NDI: 8/50    Manual therapy: x 15 minutes  -STM to R/L upper trapezius, levator scapulae, cervical paraspinals, patient was seated, used fragrance-free massage lotion.     Therapeutic Exercises:  -Standing low row with orange t-band 2 x 10  -Standing LAE with yellow tband 2 x 10   -HEP update as seen below      OP EDUCATION: Updated her HEP and provided printed handout of exercises.   Access Code: [email or web address]  URL: [email or web address]  Date: 07/18/2024  Prepared by: Griselda Garrett    Exercises  - Shoulder extension with resistance - Neutral  - 1 x daily - 5-7 x weekly - 2-3 sets - 10 reps  - Standing Shoulder Row with Anchored Resistance  - 1 x daily - 5-7 x weekly - 2-3 sets - 10 reps  - Supine Shoulder Horizontal Abduction with Resistance  - 1 x daily - 5-7 x weekly - 2-3 sets - 10 reps  - Supine Shoulder External Rotation with Resistance  - 1 x daily - 5-7 x weekly - 2 sets - 10 reps     Goals:  Active       Neck Pain       Patient will report reduction of neck,occipital and jaw pain by 75% or greater to enhance ability to sleep and perform cervical motion.  (Met)       Start:  06/05/24    Expected End:  07/17/24    Resolved:  07/18/24         Patient will demonstrate an 8% Improvement on NDI, indicating clinically important difference in function from reduction of neck pain.  (Progressing)       Start:  06/05/24    Expected End:  07/17/24            Patient will be independent and adherent to HEP to enhance functional progress and long term management of condition.  (Progressing)       Start:  06/05/24    Expected End:  07/17/24            Patient will demonstrate pain-free and full cervical ROM in all planes. (Progressing)       Start:  06/05/24    Expected End:  07/17/24            Patient will demonstrate good working knowledge of proper posture in  relation to symptom management/reduction.  (Progressing)       Start:  06/05/24    Expected End:  07/17/24            Patient will demonstrate resolution of tight tender myofascial restrictions in cervical musculature.  (Progressing)       Start:  06/05/24    Expected End:  07/17/24

## 2024-07-22 ENCOUNTER — TREATMENT (OUTPATIENT)
Dept: PHYSICAL THERAPY | Facility: CLINIC | Age: 69
End: 2024-07-22
Payer: MEDICARE

## 2024-07-22 DIAGNOSIS — M54.2 NECK PAIN: ICD-10-CM

## 2024-07-22 PROCEDURE — 97140 MANUAL THERAPY 1/> REGIONS: CPT | Mod: GP

## 2024-07-22 PROCEDURE — 97110 THERAPEUTIC EXERCISES: CPT | Mod: GP

## 2024-07-22 NOTE — PROGRESS NOTES
"Physical Therapy    Physical Therapy Treatment    Patient Name: Luz Castillo  MRN: [id number]  Today's Date: 7/22/2024    Time Entry:   Time Calculation  Start Time: 0849  Stop Time: 0930  Time Calculation (min): 41 min     PT Therapeutic Procedures Time Entry  Manual Therapy Time Entry: 10  Therapeutic Exercise Time Entry: 31    Assessment:Patient tolerated treatment well without increased complaints of pain during or after session. Significant muscle tighntess and TP right UT.  Better following manual therapy.        Plan:   Complete 1 visit, focusing on remaining impariments and finalizing home program.        Current Problem  1. Neck pain  Follow Up In Physical Therapy          General  Reason for Referral: Neck pain  Referred By: Dr. Higuera  Past Medical History Relevant to Rehab: HTN, Osteopenia, Thyroid Disorder, Liver Disease, Hepatitis, R/L TSR, Hernia Repair, Cateract removal   Visit 6     Subjective    Returns to ENT tomorrow to discuss results of tongue biopsy and POC.  States they identified pre-cancerous cells. Patient concerned with findings.  Presently reports 1-2/10 right neck pain.     Precautions   R/L TSR, denies hx of CA and denies pacemaker, osteopenia, OA and pain in right hip      General   Reason for Referral: Neck pain  Referred By: Dr. Higuera  Past Medical History Relevant to Rehab: HTN, Osteopenia, Thyroid Disorder, Liver Disease, Hepatitis, R/L TSR, Hernia Repair, Cateract removal   Visit 7     Pain  1-2/10 right neck at the start of session  0-1/10 at the end of session.       PTreatments:  Therapeutic exercises    Seated  Cervical retraction 3\" hold 1 x 10 reps; with self overpressure 3-5\" hold 2 x 10 reps  Cervical retraction extension 1 x 10 reps  Cervical retraction extension with self overpressure (R/L rotation) 2 x 10 reps  Cervical rotation R with self overpressure 3-5\" hold 2 x 10 reps   B shoulder circles BWD 1 x 20 reps   B scapular retraction with shoulder ER 5\" hold 2 x 10 " reps    Standing  B mid rows with red theratubing 2 x 10 reps  B LAE with red theratubing 1 x 10 reps    Manual therapy  -STM to R/L upper trapezius, levator scapulae, cervical paraspinals, patient was seated, used fragrance-free massage lotion.       OP EDUCATION:  Access Code: [email or web address]  URL: [email or web address]  Date: 07/18/2024  Prepared by: Griselda Garrett     Exercises  - Shoulder extension with resistance - Neutral  - 1 x daily - 5-7 x weekly - 2-3 sets - 10 reps  - Standing Shoulder Row with Anchored Resistance  - 1 x daily - 5-7 x weekly - 2-3 sets - 10 reps  - Supine Shoulder Horizontal Abduction with Resistance  - 1 x daily - 5-7 x weekly - 2-3 sets - 10 reps  - Supine Shoulder External Rotation with Resistance  - 1 x daily - 5-7 x weekly - 2 sets - 10 reps        Goals:  Active       Neck Pain       Patient will report reduction of neck,occipital and jaw pain by 75% or greater to enhance ability to sleep and perform cervical motion.  (Met)       Start:  06/05/24    Expected End:  07/17/24    Resolved:  07/18/24         Patient will demonstrate an 8% Improvement on NDI, indicating clinically important difference in function from reduction of neck pain.  (Progressing)       Start:  06/05/24    Expected End:  07/17/24            Patient will be independent and adherent to HEP to enhance functional progress and long term management of condition.  (Progressing)       Start:  06/05/24    Expected End:  07/17/24            Patient will demonstrate pain-free and full cervical ROM in all planes. (Progressing)       Start:  06/05/24    Expected End:  07/17/24            Patient will demonstrate good working knowledge of proper posture in relation to symptom management/reduction.  (Progressing)       Start:  06/05/24    Expected End:  07/17/24            Patient will demonstrate resolution of tight tender myofascial restrictions in cervical musculature.  (Progressing)       Start:  06/05/24     Expected End:  07/17/24

## 2024-07-23 ENCOUNTER — APPOINTMENT (OUTPATIENT)
Dept: OTOLARYNGOLOGY | Facility: CLINIC | Age: 69
End: 2024-07-23
Payer: MEDICARE

## 2024-07-23 VITALS — TEMPERATURE: 96.4 F | BODY MASS INDEX: 26.22 KG/M2 | WEIGHT: 148 LBS | HEIGHT: 63 IN

## 2024-07-23 DIAGNOSIS — F17.200 SMOKER: ICD-10-CM

## 2024-07-23 DIAGNOSIS — K13.21 LEUKOPLAKIA, TONGUE: ICD-10-CM

## 2024-07-23 DIAGNOSIS — K14.9 TONGUE DYSPLASIA: Primary | ICD-10-CM

## 2024-07-23 PROCEDURE — 4004F PT TOBACCO SCREEN RCVD TLK: CPT | Performed by: OTOLARYNGOLOGY

## 2024-07-23 PROCEDURE — 99214 OFFICE O/P EST MOD 30 MIN: CPT | Performed by: OTOLARYNGOLOGY

## 2024-07-23 PROCEDURE — 1160F RVW MEDS BY RX/DR IN RCRD: CPT | Performed by: OTOLARYNGOLOGY

## 2024-07-23 PROCEDURE — 3008F BODY MASS INDEX DOCD: CPT | Performed by: OTOLARYNGOLOGY

## 2024-07-23 PROCEDURE — 1159F MED LIST DOCD IN RCRD: CPT | Performed by: OTOLARYNGOLOGY

## 2024-07-23 NOTE — PROGRESS NOTES
Chief Complaint   Patient presents with    Follow-up     Follow up on tongue bx   doing fine     HPI:  Luz Castillo is a 69 y.o. female who presents in follow-up today for continued evaluation of chronic left lateral tongue lesion initially seen and biopsied in May 2022.   Also just recently biopsied July 10, 2024.  This showed severe dysplasia.  Healing well without any surgical complications.  She reports no change. No sore throat, hoarseness, dysphagia, neck pain, neck mass.  Continues to do well without any symptoms.  She is doing well without any pain. Only occasionally she will notice it when of some hard food touches it or pokes into it.    Bx 5/2022 showed some chronic inflammation and mild atypia. No obvious dysplasia or malignancy. Biopsy per report was adequate but superficial and malignancy cannot be entirely ruled out.        She is a smoker. 1 pack a day.     7/10/24 Path:  FINAL DIAGNOSIS   Tongue, left ventrolateral, biopsy:  - Severe dysplasia with verrucous hyperkeratosis, see note.     Note: Severe dysplasia involves both tissue fragments.       5/2022 Path:  TONGUE, LEFT LATERAL, EXCISION:        CHRONICALLY INFLAMED SQUAMOUS MUCOSA SHOWING PROLIFERATIVE CHANGES             WITH MILD ATYPIA,      HYPERKERATOSIS AND PARAKERATOSIS, SEE   COMMENT.     Comment: The excised tissue is relatively superficial and the lesion is   partially transected at the deep aspect and the base of the lesion is   not entirely represented. A neoplastic process is not excluded. Given   the clinical findings of mass, the current biopsy may not be   representative of the pathologic process. Clinical correlation is   recommended.   PMH:  Past Medical History:   Diagnosis Date    Alcohol dependence, in remission (Multi) 12/26/2019    History of alcoholism    Alcohol dependence, in remission (Multi) 09/20/2021    History of alcoholism    Anxiety disorder, unspecified     Anxiety and depression    Chronic viral  hepatitis C (Multi) 11/29/2021    Chronic viral hepatitis C    Hepatitis C     Osteopenia     Other long term (current) drug therapy 05/06/2021    High risk medication use    Personal history of diseases of the blood and blood-forming organs and certain disorders involving the immune mechanism     History of autoimmune disorder    Personal history of other diseases of the circulatory system     History of hypertension    Personal history of other diseases of the digestive system 09/20/2021    History of gastroesophageal reflux (GERD)    Personal history of other diseases of the female genital tract     History of endometriosis    Personal history of other endocrine, nutritional and metabolic disease 03/10/2021    History of goiter    Personal history of other infectious and parasitic diseases 03/17/2022    History of hepatitis C virus infection     Past Surgical History:   Procedure Laterality Date    MR HEAD ANGIO WO IV CONTRAST  1/12/2023    MR HEAD ANGIO WO IV CONTRAST 1/12/2023 [facility]    MR HEAD ANGIO WO IV CONTRAST  4/19/2018    MR HEAD ANGIO WO IV CONTRAST [facility]    MR NECK ANGIO WO IV CONTRAST  1/12/2023    MR NECK ANGIO WO IV CONTRAST 1/12/2023 [facility]    OTHER SURGICAL HISTORY  11/11/2021    Appendectomy    OTHER SURGICAL HISTORY  11/11/2021    Hernia repair    OTHER SURGICAL HISTORY  11/11/2021    Laparoscopy    OTHER SURGICAL HISTORY  11/11/2021    Shoulder replacement    OTHER SURGICAL HISTORY  11/11/2021    Shoulder arthroscopy    OTHER SURGICAL HISTORY  11/11/2021    Tubal ligation    OTHER SURGICAL HISTORY  11/11/2021    Colonic polypectomy    OTHER SURGICAL HISTORY  11/11/2021    Colonoscopy         Medications:     Current Outpatient Medications:     acetaminophen (Tylenol) 500 mg tablet, Take 1 tablet (500 mg) by mouth every 6 hours., Disp: , Rfl:     aspirin 81 mg EC tablet, Take 1 tablet (81 mg) by mouth once daily., Disp: , Rfl:     atorvastatin (Lipitor)  40 mg tablet, Take 1 tablet (40 mg) by mouth once daily at bedtime., Disp: 90 tablet, Rfl: 1    b complex vitamins capsule, Take 1 capsule by mouth once daily., Disp: , Rfl:     calcium carbonate 600 mg calcium (1,500 mg) tablet, Take 2 tablets (1,200 mg) by mouth., Disp: , Rfl:     cholecalciferol (Vitamin D-3) 25 MCG (1000 UT) capsule, Take 2 capsules (50 mcg) by mouth., Disp: , Rfl:     folic acid (Folvite) 1 mg tablet, Take 1 tablet (1 mg) by mouth once daily., Disp: , Rfl:     levothyroxine (Synthroid, Levoxyl) 100 mcg tablet, Take one tablet by mouth 6 days per week, Disp: 90 tablet, Rfl: 1    lisinopril 20 mg tablet, Take 1 tablet (20 mg) by mouth once daily., Disp: 90 tablet, Rfl: 1    magnesium glycinate 100 mg magnesium capsule, Take by mouth., Disp: , Rfl:     meloxicam (Mobic) 15 mg tablet, Take 1 tablet (15 mg) by mouth once daily., Disp: 30 tablet, Rfl: 2    multivit-min/ferrous gluconate (CENTAMIN ORAL), Take by mouth., Disp: , Rfl:     olopatadine (Pataday) 0.2 % ophthalmic solution, Administer into affected eye(s) once daily., Disp: , Rfl:     omega 3-dha-epa-fish oil 360 mg-108 mg- 180 mg-1,200 mg capsule, Take 1 capsule by mouth once daily., Disp: , Rfl:     pantoprazole (ProtoNix) 40 mg EC tablet, TAKE ONE TABLET BY MOUTH TWO TIMES A DAY BEFORE MEALS. TAKE 30 MINUTES BEFORE BREAKFAST AND DINNER., Disp: 180 tablet, Rfl: 1    TURMERIC ORAL, Take 500 mg by mouth., Disp: , Rfl:     amoxicillin (Amoxil) 500 mg capsule, Take 4 tablets one time for dental procedure (Patient not taking: Reported on 7/23/2024), Disp: 4 capsule, Rfl: 0     Allergies:  Allergies   Allergen Reactions    Atorvastatin Hives and Unknown    Celecoxib Unknown     Comments: heart races    Diphenhydramine Unknown    Iodine Itching and Swelling    Pramipexole Unknown    Rofecoxib Other     tacjycardia        ROS:  Review of systems normal unless stated otherwise in the HPI and/or PMH.    Physical Exam:  Temperature 35.8 °C (96.4  "°F), height 1.6 m (5' 3\"), weight 67.1 kg (148 lb). Body mass index is 26.22 kg/m².     GENERAL APPEARANCE: Well developed and well nourished.  Alert and oriented in no acute distress.  Normal vocal quality.      HEAD/FACE: No erythema or edema or facial tenderness.  Normal facial nerve function bilaterally.    EAR:       EXTERNAL: Normal pinnas and external auditory canals without lesion or obstructing wax.       MIDDLE EAR: Tympanic membranes intact and mobile with normal landmarks.  Middle ear space appears well aerated.       TUBE STATUS: N/A       MASTOID CAVITY: N/A       HEARING: Gross hearing assessment is within normal limits.      NOSE:       VISUALIZED USING: Anterior rhinoscopy with headlight and nasal speculum.       DORSUM: Midline, nontraumatic appearance.       MUCOSA: Normal-appearing.       SECRETIONS: Normal.       SEPTUM: Midline and nonobstructing.       INFERIOR TURBINATES: Normal.       MIDDLE TURBINATES/MEATUS: N/A       BLEEDING: N/A         ORAL CAVITY/PHARYNX:       TEETH: Adequate dentition.       TONGUE: No change in the approximate 2 x 4 cm left lateral tongue plaque-like area of leukoplakia.  Without any palpable depth.  No ulceration.  No tenderness.  Normal mobility.  No change.  Biopsy sites are healed.       FLOOR OF MOUTH: No mass or lesion.       PALATE: Normal hard palate, soft palate, and uvula.       OROPHARYNX: Normal without mass or lesion.       BUCCAL MUCOSA/GBS: Normal without mass or lesion.       LIPS: Normal.    LARYNX/HYPOPHARYNX/NASOPHARYNX: N/A    NECK: No palpable masses or abnormal adenopathy.  Trachea is midline.    THYROID: No thyromegaly or palpable nodule.    SALIVARY GLANDS: Normal bilateral parotid and submandibular glands by inspection and palpation.    TMJ's: Normal.    NEURO: Cranial nerve exam grossly normal bilaterally.       Assessment/Plan   Luz was seen today for follow-up.  Diagnoses and all orders for this visit:  Tongue dysplasia " (Primary)  Leukoplakia, tongue  Smoker    Provide education support about the diagnosis of the dysplasia and that this is a precancerous lesion.  This certainly can develop into malignancy in the future and cause some significant problems.  We did go over options of watchful waiting with close clinical observation versus surgical removal versus [place] opinion [place] [facility] [place]  At this point knowing the pros and cons of each approach including the risk of delayed diagnosis and morbidity and mortality associated with that, she does wish to continue active observation and does not wish any invasive procedure or consultation at this time.  I will see her again in 4 months or sooner with any changes such as but not limited to pain, ulceration, neck mass.  Would also be nice to see the dentist to make sure that there is no teeth rubbing on it.  She is aware that she should definitely stop smoking.  Follow up in about 4 months (around 11/23/2024) for Recheck, sooner with changes or worsening symptoms.     Toy Gallardo MD

## 2024-07-25 ENCOUNTER — APPOINTMENT (OUTPATIENT)
Dept: PHYSICAL THERAPY | Facility: CLINIC | Age: 69
End: 2024-07-25
Payer: MEDICARE

## 2024-07-30 ENCOUNTER — APPOINTMENT (OUTPATIENT)
Dept: PHYSICAL THERAPY | Facility: CLINIC | Age: 69
End: 2024-07-30
Payer: MEDICARE

## 2024-07-30 DIAGNOSIS — M54.2 NECK PAIN: Primary | ICD-10-CM

## 2024-07-30 PROCEDURE — 97140 MANUAL THERAPY 1/> REGIONS: CPT | Mod: GP

## 2024-07-30 PROCEDURE — 97530 THERAPEUTIC ACTIVITIES: CPT | Mod: GP

## 2024-07-30 NOTE — PROGRESS NOTES
Physical Therapy    Physical Therapy Treatment/PT Discharge Summary    Patient Name: Luz Castillo  MRN: [id number]  Today's Date: 7/30/2024    Time Entry:   Time Calculation  Start Time: 1730  Stop Time: 1800  Time Calculation (min): 30 min     PT Therapeutic Procedures Time Entry  Manual Therapy Time Entry: 10  Therapeutic Activity Time Entry: 20       Assessment:  Patient has completed 8 sessions of PT.  She reports 75% improvement in pain and states neck pain is now 2/10 and intermittent.  HA and jaw pain much improved. Improvements have been noted in cervical AROM, NDI survey score and resolution of trigger points in upper traps. See below for final goal status.     Plan:  Discharge PT.  Patient to continue indep with HEP and follow up with MD as needed.        Current Problem  1. Neck pain            General  Reason for Referral: Neck pain  Referred By: Dr. Higuera  Past Medical History Relevant to Rehab: HTN, Osteopenia, Thyroid Disorder, Liver Disease, Hepatitis, R/L TSR, Hernia Repair, Cateract removal   Visit 8     Subjective    No neck pain at present.  States she is doing a few of her home exercises maybe 2-3x/week.  She states her right jaw has not bothered her recently.  Denies HA and neck pain only bothers her occasionally.  Rates pain at worst 2/10. Reports overall her neck feels 75% better. Tongue is feeling better, but was diagnosed with pre-cancerous cells and may search out a second opinion.     Precautions  R/L TSR, denies hx of CA and denies pacemaker, osteopenia, OA and pain in right hip     Pain  0/10 pain at the start and end of session       Objective   Posture  Unchanged postural alignment.    Cervical AROM in sitting  Flexion 55 deg A, no pain  Extension 53 deg A, no pain  SB R 30 deg A , no pain  SB L 42 deg A, no pain, just tightness  Rotation R 60 deg A, no pain  Rotation L  65 deg A, no pain     Shoulder AROM  Forward flexion R/L WFL  Abduction R/L to <90 deg due to previous B reverse  TSA    Treatments:  Therapeutic activity  Re-check measurements and completion of NDI survey. See above for details.     Manual therapy  STM to R/L upper trapezius, levator scapulae, scalenes, cervical paraspinals, patient was seated, used fragrance-free massage lotion.     Other Measures  Neck Disability Index: 2/50 (4%) (improved from 8/50 (16%)       OP EDUCATION:  Patient encouraged to complete HEP as instructed.   Access Code: [email or web address]  URL: [email or web address]  Date: 07/18/2024  Prepared by: Griselda Garrett     Exercises  - Shoulder extension with resistance - Neutral  - 1 x daily - 5-7 x weekly - 2-3 sets - 10 reps  - Standing Shoulder Row with Anchored Resistance  - 1 x daily - 5-7 x weekly - 2-3 sets - 10 reps  - Supine Shoulder Horizontal Abduction with Resistance  - 1 x daily - 5-7 x weekly - 2-3 sets - 10 reps  - Supine Shoulder External Rotation with Resistance  - 1 x daily - 5-7 x weekly - 2 sets - 10 reps        Goals:  Resolved       Neck Pain       Patient will report reduction of neck,occipital and jaw pain by 75% or greater to enhance ability to sleep and perform cervical motion.  (Met)       Start:  06/05/24    Expected End:  07/17/24    Resolved:  07/18/24         Patient will demonstrate an 8% Improvement on NDI, indicating clinically important difference in function from reduction of neck pain.  (Met)       Start:  06/05/24    Expected End:  07/17/24    Resolved:  07/30/24         Patient will be independent and adherent to HEP to enhance functional progress and long term management of condition.  (Adequate for Discharge)       Start:  06/05/24    Expected End:  07/17/24            Patient will demonstrate pain-free and full cervical ROM in all planes. (Adequate for Discharge)       Start:  06/05/24    Expected End:  07/17/24            Patient will demonstrate good working knowledge of proper posture in relation to symptom management/reduction.  (Adequate for  Discharge)       Start:  06/05/24    Expected End:  07/17/24            Patient will demonstrate resolution of tight tender myofascial restrictions in cervical musculature.  (Met)       Start:  06/05/24    Expected End:  07/17/24    Resolved:  07/30/24

## 2024-08-20 DIAGNOSIS — I10 PRIMARY HYPERTENSION: ICD-10-CM

## 2024-08-22 RX ORDER — LISINOPRIL 20 MG/1
20 TABLET ORAL DAILY
Qty: 90 TABLET | Refills: 0 | Status: SHIPPED | OUTPATIENT
Start: 2024-08-22

## 2024-09-13 NOTE — PROGRESS NOTES
"[facility]  Follow Up Office Visit Note 2024    Patient Information: Luz Castillo, MRN: [id number], : 1955   Primary Care/Referring Physician: Mark Higuera DO, [id number] [address] [address]     Chief Complaint: Right neck and jaw pain    Interval History:   Ms. Castillo presents today for follow up. At her last visit I made no changes    Today she reports doing better since last seen. She has continued her HEP that she learned at PT with benefit. She stopped taking the Meloxicam because she didn't feel it was providing much relief and was possibly causing her to gain weight. She does not feel any interventional therapy is needed at this time. Of note, she is planning to undergo right hip replacement on 10/17/24.    Brief History of Pain: Ms. Luz Castillo is a 69 y.o. female with a PMHx of prior HTN, HLD, GERD, cirrhosis, depression, EtOH use/polysubstance use disorder (3 years sober) who presents for right neck and jaw pain.    She reports issues with neck pain for many years, but it has generally been self limited and improves with conservative care. She states that on 24 this flared up again. She woke up in the morning and had significant pain and stiffness. The pain starts in her right occiput and radiates to her right jaw and occasionally over the top of her right head. She describes the pain as \"annoying\" and it occasionally is sharp. She reports worsening of pain with extension of her neck, and with lateral bending of her head in either direction. She denies any vision or hearing changes when she has headaches. She occasionally has pain radiating to the right shoulder but not further down the arm. She denies any jaw clicking but feels \"crackling\" in her neck    Current Pain Medications: OTC Ibuprofen  Previously Tried Pain Medications: PO steroids - cause her to be angry, Meloxicam - feels it was causing weight gain, Tylenol - no benefit, Tramadol. Thinks she " has tried muscle relaxants in the past, Ibuprofen    Relevant Surgeries: Denies cervical spine surgery. History of bilateral shoulder replacement  Injections: Denies  Physical/Occupational Therapy: She has completed PT for the neck pain    Medications:   Current Outpatient Medications   Medication Instructions    acetaminophen (TYLENOL) 500 mg, oral, Every 6 hours    amoxicillin (Amoxil) 500 mg capsule Take 4 tablets one time for dental procedure    aspirin 81 mg EC tablet 1 tablet, oral, Daily    atorvastatin (LIPITOR) 40 mg, oral, Nightly    b complex vitamins capsule 1 capsule, oral, Daily    calcium carbonate 1,200 mg, oral    cholecalciferol (VITAMIN D-3) 50 mcg, oral    folic acid (Folvite) 1 mg tablet 1 tablet, oral, Daily    levothyroxine (Synthroid, Levoxyl) 100 mcg tablet Take one tablet by mouth 6 days per week    lisinopril 20 mg, oral, Daily    magnesium glycinate 100 mg magnesium capsule oral    multivit-min/ferrous gluconate (CENTAMIN ORAL) oral    olopatadine (Pataday) 0.2 % ophthalmic solution ophthalmic (eye), Daily RT    omega 3-dha-epa-fish oil 360 mg-108 mg- 180 mg-1,200 mg capsule 1 capsule, oral, Daily    pantoprazole (ProtoNix) 40 mg EC tablet TAKE ONE TABLET BY MOUTH TWO TIMES A DAY BEFORE MEALS. TAKE 30 MINUTES BEFORE BREAKFAST AND DINNER.    TURMERIC ORAL 500 mg, oral      Allergies:   Allergies   Allergen Reactions    Celecoxib Unknown     Comments: heart races    Diphenhydramine Unknown    Iodine Itching and Swelling    Lipitor [Atorvastatin] Hives and Unknown     Pt. Is allergic to specifically Lipitor    Pramipexole Unknown    Rofecoxib Other     tacjycardia       Past Medical & Surgical History:  Past Medical History:   Diagnosis Date    Alcohol dependence, in remission (Multi) 12/26/2019    History of alcoholism    Alcohol dependence, in remission (Multi) 09/20/2021    History of alcoholism    Anxiety disorder, unspecified     Anxiety and depression    Chronic viral hepatitis C  (Multi) 11/29/2021    Chronic viral hepatitis C    Hepatitis C     Osteopenia     Other long term (current) drug therapy 05/06/2021    High risk medication use    Personal history of diseases of the blood and blood-forming organs and certain disorders involving the immune mechanism     History of autoimmune disorder    Personal history of other diseases of the circulatory system     History of hypertension    Personal history of other diseases of the digestive system 09/20/2021    History of gastroesophageal reflux (GERD)    Personal history of other diseases of the female genital tract     History of endometriosis    Personal history of other endocrine, nutritional and metabolic disease 03/10/2021    History of goiter    Personal history of other infectious and parasitic diseases 03/17/2022    History of hepatitis C virus infection      Past Surgical History:   Procedure Laterality Date    MR HEAD ANGIO WO IV CONTRAST  1/12/2023    MR HEAD ANGIO WO IV CONTRAST 1/12/2023 [facility]    MR HEAD ANGIO WO IV CONTRAST  4/19/2018    MR HEAD ANGIO WO IV CONTRAST [facility]    MR NECK ANGIO WO IV CONTRAST  1/12/2023    MR NECK ANGIO WO IV CONTRAST 1/12/2023 [facility]    OTHER SURGICAL HISTORY  11/11/2021    Appendectomy    OTHER SURGICAL HISTORY  11/11/2021    Hernia repair    OTHER SURGICAL HISTORY  11/11/2021    Laparoscopy    OTHER SURGICAL HISTORY  11/11/2021    Shoulder replacement    OTHER SURGICAL HISTORY  11/11/2021    Shoulder arthroscopy    OTHER SURGICAL HISTORY  11/11/2021    Tubal ligation    OTHER SURGICAL HISTORY  11/11/2021    Colonic polypectomy    OTHER SURGICAL HISTORY  11/11/2021    Colonoscopy       Family History   Problem Relation Name Age of Onset    Heart disease Father      Diabetes Father       Social History     Socioeconomic History    Marital status: Single     Spouse name: Not on file    Number of children: Not on file    Years of education: Not on file     "Highest education level: Not on file   Occupational History    Not on file   Tobacco Use    Smoking status: Every Day     Current packs/day: 1.00     Types: Cigarettes    Smokeless tobacco: Never   Substance and Sexual Activity    Alcohol use: Never    Drug use: Never    Sexual activity: Not on file   Other Topics Concern    Not on file   Social History Narrative    Not on file     Social Determinants of Health     Financial Resource Strain: Not on file   Food Insecurity: Not on file   Transportation Needs: Not on file   Physical Activity: Not on file   Stress: Not on file   Social Connections: Not on file   Intimate Partner Violence: Not on file   Housing Stability: Not on file       Problems, Past medical history, past surgical history, Medications, allergies, social and family history reviewed and as per the electronic medical record from today's encounter    Review of Systems:  CONST: No fever, chills, fatigue, weight changes  EYES: No loss of vision  ENT: No hearing loss, tinnitus  CV: No chest pain, palpitations  RESP: No dyspnea, shortness of breath, cough  GI: No stool incontinence, nausea, vomiting  : No urinary incontinence  MSK: No joint swelling  SKIN: No rash, no hives  NEURO: No dizziness, weakness, paresthesias  PSYCH: Hx of depression  HEM/LYMPH: No easy bruising or bleeding  All other systems reviewed are negative     Physical Exam:  Vitals: /64   Pulse 70   Resp 18   Ht 1.6 m (5' 3\")   Wt 67.1 kg (148 lb)   SpO2 97%   BMI 26.22 kg/m²   General: No apparent distress. Alert, appropriate, oriented x 3. Mood and affect normal. Speaking in full sentences.  HENT: Normocephalic, atraumatic. Hearing intact.  Eyes: Extraocular movements grossly intact. Pupils equal and round.   Neck: Supple, trachea midline.  Lungs: Symmetric respiratory excursion on visual exam, nonlabored breathing.  Extremities: No clubbing, cyanosis, or edema noted in arms or legs.  Skin: No rashes, lesions, alopecia noted " on back or extremities.   Neuro: Alert and appropriate. Gait within normal limits. Bulk and tone within normal limits.    Laboratory Data:  The following laboratory data were reviewed during this visit:   Lab Results   Component Value Date    WBC 5.0 03/13/2024    RBC 4.70 03/13/2024    HGB 15.0 03/13/2024    HCT 44.5 03/13/2024     03/13/2024      Lab Results   Component Value Date    INR 1.0 02/03/2023    INR 1.0 09/12/2022    INR 1.0 11/11/2021     Lab Results   Component Value Date    CREATININE 0.83 03/13/2024    HGBA1C 5.6 03/13/2024       Imaging:  The following imaging impressions were reviewed by me during this visit:    -4/20/23 cervical spine MRI shows C3-C4 uncovertebral joint hypertrophy, facet hypertrophy, and mild posterior disc protrusion along the ventral thecal sac with resulting mild canal stenosis and moderate bilateral neural foraminal stenosis. C4-C5 mild uncovertebral joint hypertrophy, facet hypertrophy, and mild posterior disc bulge/pseudo bulge. C5-C6 prominent disc osteophyte complex including disc bulge/pseudo bulge minimally flattening the ventral spinal cord with mild to moderate canal stenosis and mild-to-moderate left neural foraminal narrowing.     I also personally reviewed the images from the above studies myself. These images and my interpretation of them contributed to the management and decision making of the patient's medical plan.    ASSESSMENT:  Ms. Luz Castillo is a 69 y.o. female with right neck and jaw pain that is consistent with:    1. Occipital neuralgia of right side    2. Arthropathy of cervical facet joint    3. Cervicogenic headache          PLAN:    Diagnostics:   - I reviewed her most recent cervical spine MRI (see above for details). No further diagnostics are indicated at this time.    Physical Therapy and Rehabilitation:     - Continue your current HEP    Psychologically:  - No needs at this time    Medications  - Discontinue Meloxicam. No other  changes to medication today    Duration  - Multiple years, with significant worsening 1 month ago    Interventions:  - I suspect her neck pain and headaches are secondary to occipital neuralgia, with possible contributions from cervical facet arthropathy and cervicogenic headache. I did recommend an ultrasound guided right greater/lesser occipital nerve block for diagnostic and therapeutic purposes but she would like to think about this  - Would consider right cervical mbb's/RFA in the future        Sincerely,  Cesar Sawant MD  [facility] - [place]

## 2024-09-18 ENCOUNTER — OFFICE VISIT (OUTPATIENT)
Dept: PAIN MEDICINE | Facility: CLINIC | Age: 69
End: 2024-09-18
Payer: MEDICARE

## 2024-09-18 ENCOUNTER — TELEPHONE (OUTPATIENT)
Dept: PRIMARY CARE | Facility: CLINIC | Age: 69
End: 2024-09-18

## 2024-09-18 VITALS
HEIGHT: 63 IN | RESPIRATION RATE: 18 BRPM | DIASTOLIC BLOOD PRESSURE: 64 MMHG | OXYGEN SATURATION: 97 % | HEART RATE: 70 BPM | WEIGHT: 148 LBS | BODY MASS INDEX: 26.22 KG/M2 | SYSTOLIC BLOOD PRESSURE: 122 MMHG

## 2024-09-18 DIAGNOSIS — E03.9 HYPOTHYROIDISM, UNSPECIFIED TYPE: ICD-10-CM

## 2024-09-18 DIAGNOSIS — Z00.00 HEALTHCARE MAINTENANCE: ICD-10-CM

## 2024-09-18 DIAGNOSIS — G44.86 CERVICOGENIC HEADACHE: ICD-10-CM

## 2024-09-18 DIAGNOSIS — R79.89 ELEVATED LFTS: Primary | ICD-10-CM

## 2024-09-18 DIAGNOSIS — M47.812 ARTHROPATHY OF CERVICAL FACET JOINT: ICD-10-CM

## 2024-09-18 DIAGNOSIS — M54.81 OCCIPITAL NEURALGIA OF RIGHT SIDE: Primary | ICD-10-CM

## 2024-09-18 PROCEDURE — 1125F AMNT PAIN NOTED PAIN PRSNT: CPT | Performed by: STUDENT IN AN ORGANIZED HEALTH CARE EDUCATION/TRAINING PROGRAM

## 2024-09-18 PROCEDURE — 99213 OFFICE O/P EST LOW 20 MIN: CPT | Performed by: STUDENT IN AN ORGANIZED HEALTH CARE EDUCATION/TRAINING PROGRAM

## 2024-09-18 PROCEDURE — 3074F SYST BP LT 130 MM HG: CPT | Performed by: STUDENT IN AN ORGANIZED HEALTH CARE EDUCATION/TRAINING PROGRAM

## 2024-09-18 PROCEDURE — 1159F MED LIST DOCD IN RCRD: CPT | Performed by: STUDENT IN AN ORGANIZED HEALTH CARE EDUCATION/TRAINING PROGRAM

## 2024-09-18 PROCEDURE — 3008F BODY MASS INDEX DOCD: CPT | Performed by: STUDENT IN AN ORGANIZED HEALTH CARE EDUCATION/TRAINING PROGRAM

## 2024-09-18 PROCEDURE — 4004F PT TOBACCO SCREEN RCVD TLK: CPT | Performed by: STUDENT IN AN ORGANIZED HEALTH CARE EDUCATION/TRAINING PROGRAM

## 2024-09-18 PROCEDURE — 1160F RVW MEDS BY RX/DR IN RCRD: CPT | Performed by: STUDENT IN AN ORGANIZED HEALTH CARE EDUCATION/TRAINING PROGRAM

## 2024-09-18 PROCEDURE — 3078F DIAST BP <80 MM HG: CPT | Performed by: STUDENT IN AN ORGANIZED HEALTH CARE EDUCATION/TRAINING PROGRAM

## 2024-09-18 RX ORDER — AMOXICILLIN 500 MG/1
CAPSULE ORAL
Qty: 4 CAPSULE | Refills: 0 | Status: SHIPPED | OUTPATIENT
Start: 2024-09-18

## 2024-09-18 ASSESSMENT — PATIENT HEALTH QUESTIONNAIRE - PHQ9
2. FEELING DOWN, DEPRESSED OR HOPELESS: NOT AT ALL
SUM OF ALL RESPONSES TO PHQ9 QUESTIONS 1 AND 2: 0
1. LITTLE INTEREST OR PLEASURE IN DOING THINGS: NOT AT ALL

## 2024-09-18 ASSESSMENT — PAIN DESCRIPTION - DESCRIPTORS: DESCRIPTORS: ACHING

## 2024-09-18 ASSESSMENT — PAIN SCALES - GENERAL
PAINLEVEL_OUTOF10: 2
PAINLEVEL: 2

## 2024-09-18 ASSESSMENT — PAIN - FUNCTIONAL ASSESSMENT: PAIN_FUNCTIONAL_ASSESSMENT: 0-10

## 2024-09-18 NOTE — TELEPHONE ENCOUNTER
Would like antibiotics for dental appt for Monday and would like blood work as well.  She is having surgery in October and needs blood work for that if possible

## 2024-09-19 ENCOUNTER — LAB (OUTPATIENT)
Dept: LAB | Facility: LAB | Age: 69
End: 2024-09-19
Payer: MEDICARE

## 2024-09-19 DIAGNOSIS — E03.9 HYPOTHYROIDISM, UNSPECIFIED TYPE: ICD-10-CM

## 2024-09-19 DIAGNOSIS — R79.89 ELEVATED LFTS: ICD-10-CM

## 2024-09-19 LAB
ALBUMIN SERPL BCP-MCNC: 4.4 G/DL (ref 3.4–5)
ALP SERPL-CCNC: 58 U/L (ref 33–136)
ALT SERPL W P-5'-P-CCNC: 79 U/L (ref 7–45)
AST SERPL W P-5'-P-CCNC: 55 U/L (ref 9–39)
BILIRUB DIRECT SERPL-MCNC: 0.1 MG/DL (ref 0–0.3)
BILIRUB SERPL-MCNC: 0.4 MG/DL (ref 0–1.2)
PROT SERPL-MCNC: 7.1 G/DL (ref 6.4–8.2)
TSH SERPL-ACNC: 1.95 MIU/L (ref 0.44–3.98)

## 2024-09-19 PROCEDURE — 80076 HEPATIC FUNCTION PANEL: CPT

## 2024-09-19 PROCEDURE — 36415 COLL VENOUS BLD VENIPUNCTURE: CPT

## 2024-09-19 PROCEDURE — 84443 ASSAY THYROID STIM HORMONE: CPT

## 2024-09-20 ENCOUNTER — APPOINTMENT (OUTPATIENT)
Dept: PRIMARY CARE | Facility: CLINIC | Age: 69
End: 2024-09-20
Payer: MEDICARE

## 2024-09-20 VITALS
HEIGHT: 63 IN | HEART RATE: 70 BPM | DIASTOLIC BLOOD PRESSURE: 84 MMHG | BODY MASS INDEX: 26.22 KG/M2 | OXYGEN SATURATION: 98 % | SYSTOLIC BLOOD PRESSURE: 129 MMHG | WEIGHT: 148 LBS

## 2024-09-20 DIAGNOSIS — F17.210 CIGARETTE NICOTINE DEPENDENCE WITHOUT COMPLICATION: Primary | ICD-10-CM

## 2024-09-20 DIAGNOSIS — R79.89 ELEVATED LFTS: ICD-10-CM

## 2024-09-20 DIAGNOSIS — Z01.818 PREOPERATIVE CLEARANCE: ICD-10-CM

## 2024-09-20 DIAGNOSIS — E78.5 HYPERLIPIDEMIA, UNSPECIFIED HYPERLIPIDEMIA TYPE: ICD-10-CM

## 2024-09-20 DIAGNOSIS — E03.9 HYPOTHYROIDISM, UNSPECIFIED TYPE: ICD-10-CM

## 2024-09-20 PROCEDURE — 3074F SYST BP LT 130 MM HG: CPT | Performed by: INTERNAL MEDICINE

## 2024-09-20 PROCEDURE — 3008F BODY MASS INDEX DOCD: CPT | Performed by: INTERNAL MEDICINE

## 2024-09-20 PROCEDURE — 3079F DIAST BP 80-89 MM HG: CPT | Performed by: INTERNAL MEDICINE

## 2024-09-20 PROCEDURE — 99214 OFFICE O/P EST MOD 30 MIN: CPT | Performed by: INTERNAL MEDICINE

## 2024-09-20 PROCEDURE — 1159F MED LIST DOCD IN RCRD: CPT | Performed by: INTERNAL MEDICINE

## 2024-09-20 RX ORDER — VARENICLINE TARTRATE 1 MG/1
1 TABLET, FILM COATED ORAL 2 TIMES DAILY
Qty: 90 TABLET | Refills: 1 | Status: SHIPPED | OUTPATIENT
Start: 2024-09-20

## 2024-09-20 RX ORDER — VARENICLINE TARTRATE 0.5 MG/1
0.5 TABLET, FILM COATED ORAL SEE ADMIN INSTRUCTIONS
Qty: 11 TABLET | Refills: 0 | Status: SHIPPED | OUTPATIENT
Start: 2024-09-20

## 2024-09-20 RX ORDER — ATORVASTATIN CALCIUM 40 MG/1
20 TABLET, FILM COATED ORAL NIGHTLY
Qty: 15 TABLET | Refills: 0 | Status: SHIPPED | OUTPATIENT
Start: 2024-09-20 | End: 2024-10-20

## 2024-09-20 ASSESSMENT — ENCOUNTER SYMPTOMS
SHORTNESS OF BREATH: 0
FEVER: 0
NAUSEA: 0
PALPITATIONS: 0
CHILLS: 0

## 2024-09-20 ASSESSMENT — PATIENT HEALTH QUESTIONNAIRE - PHQ9
SUM OF ALL RESPONSES TO PHQ9 QUESTIONS 1 AND 2: 0
2. FEELING DOWN, DEPRESSED OR HOPELESS: NOT AT ALL
1. LITTLE INTEREST OR PLEASURE IN DOING THINGS: NOT AT ALL

## 2024-09-20 NOTE — PATIENT INSTRUCTIONS
-Start varenicline, titrate to 1mg twice daily. Recommended decreasing smoking by 50% every 4 weeks with goal total cessation by week 12, cont varenicline for 24 weeks.    Decrease atorvastatin to 20mg daily     6 months

## 2024-09-20 NOTE — PROGRESS NOTES
"Subjective   Patient ID: Luz Castillo is a 69 y.o. female who presents for Follow-up (6 month follow up) and Pre-op Exam.    HPI   Patient is here for a follow up and is planning for left hip replacement surgery October 17th by Dr. Patel and would like preop clearance. NO CP, SOB or MARIE. No reactions to previous anesthesia. Is able to go up two flights of stairs without stopping.     Patient found out she has precancerous cells in her mouth after a biopsy- so she would also like to quit smoking.    Denies MARIE/SOB, CP, MELLY, palpitations, orthopnea, fever, nausea, chills.    Review of Systems   Constitutional:  Negative for chills and fever.   Respiratory:  Negative for shortness of breath.    Cardiovascular:  Negative for chest pain and palpitations.   Gastrointestinal:  Negative for nausea.       Objective   /84   Pulse 70   Ht 1.6 m (5' 3\")   Wt 67.1 kg (148 lb)   SpO2 98%   BMI 26.22 kg/m²     Physical Exam  Constitutional:       General: She is not in acute distress.  Cardiovascular:      Rate and Rhythm: Normal rate and regular rhythm.      Heart sounds:      No friction rub. No gallop.   Pulmonary:      Effort: Pulmonary effort is normal. No respiratory distress.   Skin:     General: Skin is warm and dry.   Neurological:      Mental Status: She is alert and oriented to person, place, and time.   Psychiatric:         Mood and Affect: Mood normal.         Behavior: Behavior normal.         Assessment/Plan   #Preoperative Clearance for Left Hip  -Cleared for surgery for any futher testing     #HLD  #Hx of TIA  -No new neuro symptoms.   -Cont ASA daily.  -Reduce atorvastatin to 20mg daily - due to elevated transaminase     #Cervical radiculopathy and low back pain   #Cervicalgia, TMJ  -Follows with Dr. Issa and Dr. Logan Gee  -Pain management, Dr. Sawant started meloxicam but pt stopped due to no benefit  -Sees PT     #Tongue lesion  #Oral leukoplakia  -Sees Dr. Gallardo   -Recent biopsy " shows precancerous cells  -Patient would like seek a second opinion     #Hypothyroidism, multinodular goiter   -Levothyroxine 100mcg to 6 days weekly  -TSH 1.95 24  -Asymptomatic, well controlled     #Hepatitis C  #Elevated Transaminases   -s/p Mavyret, Following with hepatology   -Mild ALT/AST elevation  -Reduced statin dose as above  -Will see Dr. Isabel in October      #HTN  -Well controlled  -Cont lisinopril 20mg daily     #GERD  -Asymptomatic, well controlled  -cont Pantoprazole 40mg daily     #Constipation  -Following with GI  -Fiber and Miralax     #Polysubstance abuse, in remission   -Cont 12 step meetings and outpatient treatment     #Tobacco use disorder  -Patient is ready to quit.  -Start varenicline  -Rx varenicline, titrate to 1mg BID. Recommended decreasing smoking by 50% every 4 weeks with goal total cessation by week 12, cont varenicline for 24 weeks.         Influenza: Declines   RSV: Recommended  COVID: x2, recommended  Prevnar 13: Never  Pneumovax 23: 21  Shingrix: Never, recommended  Mamm: 24  DEXA: 24  Pap: Never  Colorectal ca: 10/5/22- 5 years   Lung ca screenin24    Sim Carlson DO, PGY-1  Internal Medicine   24 at 9:29 AM.    I saw and evaluated the patient. I personally obtained the key and critical portions of the history and physical exam or was physically present for key and critical portions performed by the resident/fellow. I reviewed the resident/fellow's documentation and discussed the patient with the resident/fellow. I agree with the resident/fellow's medical decision making as documented in the note.    Mark Higuera DO

## 2024-09-23 ENCOUNTER — LAB (OUTPATIENT)
Dept: LAB | Facility: LAB | Age: 69
End: 2024-09-23
Payer: MEDICARE

## 2024-09-23 DIAGNOSIS — Z01.818 PREOPERATIVE CLEARANCE: ICD-10-CM

## 2024-09-23 LAB
ANION GAP SERPL CALC-SCNC: 13 MMOL/L (ref 10–20)
BASOPHILS # BLD AUTO: 0.04 X10*3/UL (ref 0–0.1)
BASOPHILS NFR BLD AUTO: 0.8 %
BUN SERPL-MCNC: 10 MG/DL (ref 6–23)
CALCIUM SERPL-MCNC: 9.4 MG/DL (ref 8.6–10.3)
CHLORIDE SERPL-SCNC: 102 MMOL/L (ref 98–107)
CO2 SERPL-SCNC: 27 MMOL/L (ref 21–32)
CREAT SERPL-MCNC: 0.73 MG/DL (ref 0.5–1.05)
EGFRCR SERPLBLD CKD-EPI 2021: 89 ML/MIN/1.73M*2
EOSINOPHIL # BLD AUTO: 0.25 X10*3/UL (ref 0–0.7)
EOSINOPHIL NFR BLD AUTO: 5.1 %
ERYTHROCYTE [DISTWIDTH] IN BLOOD BY AUTOMATED COUNT: 12.6 % (ref 11.5–14.5)
GLUCOSE SERPL-MCNC: 89 MG/DL (ref 74–99)
HCT VFR BLD AUTO: 43.4 % (ref 36–46)
HGB BLD-MCNC: 14.2 G/DL (ref 12–16)
IMM GRANULOCYTES # BLD AUTO: 0.01 X10*3/UL (ref 0–0.7)
IMM GRANULOCYTES NFR BLD AUTO: 0.2 % (ref 0–0.9)
LYMPHOCYTES # BLD AUTO: 1.7 X10*3/UL (ref 1.2–4.8)
LYMPHOCYTES NFR BLD AUTO: 34.8 %
MCH RBC QN AUTO: 31.6 PG (ref 26–34)
MCHC RBC AUTO-ENTMCNC: 32.7 G/DL (ref 32–36)
MCV RBC AUTO: 97 FL (ref 80–100)
MONOCYTES # BLD AUTO: 0.49 X10*3/UL (ref 0.1–1)
MONOCYTES NFR BLD AUTO: 10 %
NEUTROPHILS # BLD AUTO: 2.4 X10*3/UL (ref 1.2–7.7)
NEUTROPHILS NFR BLD AUTO: 49.1 %
NRBC BLD-RTO: 0 /100 WBCS (ref 0–0)
PLATELET # BLD AUTO: 230 X10*3/UL (ref 150–450)
POTASSIUM SERPL-SCNC: 4.8 MMOL/L (ref 3.5–5.3)
RBC # BLD AUTO: 4.49 X10*6/UL (ref 4–5.2)
SODIUM SERPL-SCNC: 137 MMOL/L (ref 136–145)
WBC # BLD AUTO: 4.9 X10*3/UL (ref 4.4–11.3)

## 2024-09-23 PROCEDURE — 80048 BASIC METABOLIC PNL TOTAL CA: CPT

## 2024-09-23 PROCEDURE — 36415 COLL VENOUS BLD VENIPUNCTURE: CPT

## 2024-09-23 PROCEDURE — 85025 COMPLETE CBC W/AUTO DIFF WBC: CPT

## 2024-09-25 ENCOUNTER — TELEPHONE (OUTPATIENT)
Dept: PRIMARY CARE | Facility: CLINIC | Age: 69
End: 2024-09-25
Payer: MEDICARE

## 2024-09-25 NOTE — TELEPHONE ENCOUNTER
Called patient to get number,  than discuss problem with Brina and said there is nothing we can do. Will call patient tomorrow to inform

## 2024-09-25 NOTE — TELEPHONE ENCOUNTER
Luz called her insurance company to inform them that you are not a surgeon and she stated the only way for her to still be your patient is if you call [employer] to tell them you are an internal medicine provider. She's not sure what to do And she doesn't want this to affect her surgery as well

## 2024-10-03 DIAGNOSIS — R11.0 NAUSEA: Primary | ICD-10-CM

## 2024-10-03 RX ORDER — ONDANSETRON 4 MG/1
4 TABLET, FILM COATED ORAL EVERY 8 HOURS PRN
Qty: 40 TABLET | Refills: 1 | Status: SHIPPED | OUTPATIENT
Start: 2024-10-03

## 2024-10-04 ENCOUNTER — TELEPHONE (OUTPATIENT)
Dept: PRIMARY CARE | Facility: CLINIC | Age: 69
End: 2024-10-04
Payer: MEDICARE

## 2024-10-04 NOTE — TELEPHONE ENCOUNTER
----- Message from Mark Higuera sent at 10/3/2024  1:09 PM EDT -----  Done, also she can take 1/2 tab twice daily and see it that helps with her nasuea. Id rather her take half dose than no dose at all  ----- Message -----  From: Brina Grimm RN  Sent: 10/3/2024  11:58 AM EDT  To: Mark Higuera, DO    Chantex is causing nausea. Can we send in zofran?   At the full dose

## 2024-10-07 ENCOUNTER — HOSPITAL ENCOUNTER (OUTPATIENT)
Dept: RADIOLOGY | Facility: CLINIC | Age: 69
Discharge: HOME | End: 2024-10-07
Payer: MEDICARE

## 2024-10-07 ENCOUNTER — LAB (OUTPATIENT)
Dept: LAB | Facility: LAB | Age: 69
End: 2024-10-07
Payer: MEDICARE

## 2024-10-07 ENCOUNTER — CLINICAL SUPPORT (OUTPATIENT)
Dept: GASTROENTEROLOGY | Facility: CLINIC | Age: 69
End: 2024-10-07
Payer: MEDICARE

## 2024-10-07 DIAGNOSIS — Z86.19 HEPATITIS C VIRUS INFECTION CURED AFTER ANTIVIRAL DRUG THERAPY: ICD-10-CM

## 2024-10-07 DIAGNOSIS — K74.00 LIVER FIBROSIS: ICD-10-CM

## 2024-10-07 DIAGNOSIS — Z71.2 ENCOUNTER TO DISCUSS TEST RESULTS: ICD-10-CM

## 2024-10-07 LAB
AFP SERPL-MCNC: <4 NG/ML (ref 0–9)
ALBUMIN SERPL BCP-MCNC: 4.4 G/DL (ref 3.4–5)
ALP SERPL-CCNC: 52 U/L (ref 33–136)
ALT SERPL W P-5'-P-CCNC: 45 U/L (ref 7–45)
AST SERPL W P-5'-P-CCNC: 30 U/L (ref 9–39)
BASOPHILS # BLD AUTO: 0.06 X10*3/UL (ref 0–0.1)
BASOPHILS NFR BLD AUTO: 1.3 %
BILIRUB DIRECT SERPL-MCNC: 0.1 MG/DL (ref 0–0.3)
BILIRUB SERPL-MCNC: 0.4 MG/DL (ref 0–1.2)
EOSINOPHIL # BLD AUTO: 0.13 X10*3/UL (ref 0–0.7)
EOSINOPHIL NFR BLD AUTO: 2.7 %
ERYTHROCYTE [DISTWIDTH] IN BLOOD BY AUTOMATED COUNT: 12.8 % (ref 11.5–14.5)
HCT VFR BLD AUTO: 43.2 % (ref 36–46)
HGB BLD-MCNC: 14.5 G/DL (ref 12–16)
IMM GRANULOCYTES # BLD AUTO: 0.01 X10*3/UL (ref 0–0.7)
IMM GRANULOCYTES NFR BLD AUTO: 0.2 % (ref 0–0.9)
LYMPHOCYTES # BLD AUTO: 1.19 X10*3/UL (ref 1.2–4.8)
LYMPHOCYTES NFR BLD AUTO: 24.8 %
MCH RBC QN AUTO: 32.3 PG (ref 26–34)
MCHC RBC AUTO-ENTMCNC: 33.6 G/DL (ref 32–36)
MCV RBC AUTO: 96 FL (ref 80–100)
MONOCYTES # BLD AUTO: 0.59 X10*3/UL (ref 0.1–1)
MONOCYTES NFR BLD AUTO: 12.3 %
NEUTROPHILS # BLD AUTO: 2.81 X10*3/UL (ref 1.2–7.7)
NEUTROPHILS NFR BLD AUTO: 58.7 %
NRBC BLD-RTO: 0 /100 WBCS (ref 0–0)
PLATELET # BLD AUTO: 238 X10*3/UL (ref 150–450)
PROT SERPL-MCNC: 7.1 G/DL (ref 6.4–8.2)
RBC # BLD AUTO: 4.49 X10*6/UL (ref 4–5.2)
WBC # BLD AUTO: 4.8 X10*3/UL (ref 4.4–11.3)

## 2024-10-07 PROCEDURE — 91200 LIVER ELASTOGRAPHY: CPT | Performed by: INTERNAL MEDICINE

## 2024-10-07 PROCEDURE — 80076 HEPATIC FUNCTION PANEL: CPT

## 2024-10-07 PROCEDURE — 36415 COLL VENOUS BLD VENIPUNCTURE: CPT

## 2024-10-07 PROCEDURE — 76705 ECHO EXAM OF ABDOMEN: CPT | Performed by: RADIOLOGY

## 2024-10-07 PROCEDURE — 82105 ALPHA-FETOPROTEIN SERUM: CPT

## 2024-10-07 PROCEDURE — 76705 ECHO EXAM OF ABDOMEN: CPT

## 2024-10-07 PROCEDURE — 85025 COMPLETE CBC W/AUTO DIFF WBC: CPT

## 2024-10-09 DIAGNOSIS — E03.9 HYPOTHYROIDISM, UNSPECIFIED TYPE: ICD-10-CM

## 2024-10-12 DIAGNOSIS — E78.5 HYPERLIPIDEMIA, UNSPECIFIED HYPERLIPIDEMIA TYPE: ICD-10-CM

## 2024-10-13 RX ORDER — LEVOTHYROXINE SODIUM 100 UG/1
TABLET ORAL
Qty: 90 TABLET | Refills: 1 | Status: SHIPPED | OUTPATIENT
Start: 2024-10-13

## 2024-10-14 RX ORDER — ATORVASTATIN CALCIUM 40 MG/1
40 TABLET, FILM COATED ORAL NIGHTLY
Qty: 90 TABLET | Refills: 1 | Status: SHIPPED | OUTPATIENT
Start: 2024-10-14

## 2024-10-17 ENCOUNTER — DOCUMENTATION (OUTPATIENT)
Dept: HOME HEALTH SERVICES | Facility: HOME HEALTH | Age: 69
End: 2024-10-17
Payer: MEDICARE

## 2024-10-17 ENCOUNTER — HOME HEALTH ADMISSION (OUTPATIENT)
Dept: HOME HEALTH SERVICES | Facility: HOME HEALTH | Age: 69
End: 2024-10-17
Payer: MEDICARE

## 2024-10-17 NOTE — HH CARE COORDINATION
Home Care received a Referral for Physical Therapy. We have processed the referral for a Start of Care on 24 HOURS OR CONFIRMED DATE FROM REFERRING PROVIDER .     If you have any questions or concerns, please feel free to contact us at [phone]. Follow the prompts, enter your five digit zip code, and you will be directed to your care team on [facility].   Topical Sulfur Applications Counseling: Topical Sulfur Counseling: Patient counseled that this medication may cause skin irritation or allergic reactions.  In the event of skin irritation, the patient was advised to reduce the amount of the drug applied or use it less frequently.   The patient verbalized understanding of the proper use and possible adverse effects of topical sulfur application.  All of the patient's questions and concerns were addressed.

## 2024-10-18 ENCOUNTER — APPOINTMENT (OUTPATIENT)
Dept: GASTROENTEROLOGY | Facility: CLINIC | Age: 69
End: 2024-10-18
Payer: MEDICARE

## 2024-10-18 ENCOUNTER — APPOINTMENT (OUTPATIENT)
Dept: RADIOLOGY | Facility: CLINIC | Age: 69
End: 2024-10-18
Payer: MEDICARE

## 2024-10-22 ENCOUNTER — HOME CARE VISIT (OUTPATIENT)
Dept: HOME HEALTH SERVICES | Facility: HOME HEALTH | Age: 69
End: 2024-10-22
Payer: MEDICARE

## 2024-10-22 VITALS
SYSTOLIC BLOOD PRESSURE: 122 MMHG | DIASTOLIC BLOOD PRESSURE: 68 MMHG | RESPIRATION RATE: 18 BRPM | OXYGEN SATURATION: 97 % | TEMPERATURE: 98.8 F | HEART RATE: 70 BPM

## 2024-10-22 PROCEDURE — G0151 HHCP-SERV OF PT,EA 15 MIN: HCPCS

## 2024-10-22 SDOH — HEALTH STABILITY: PHYSICAL HEALTH
EXERCISE COMMENTS: R LE THA PROTOCOL EXERCISES IN SUPINE AND SITTING POS., 10 REPS X 1 SET EA EXER. WITH INSTR. FOR PROPER PACE AND FULL AVAILABLE ROM FOR EACH EXERCISE.

## 2024-10-22 ASSESSMENT — ACTIVITIES OF DAILY LIVING (ADL)
AMBULATION ASSISTANCE: 1
AMBULATION ASSISTANCE ON FLAT SURFACES: 1
PHYSICAL TRANSFERS ASSESSED: 1
AMBULATION_DISTANCE/DURATION_TOLERATED: 80 FEET X 1
CURRENT_FUNCTION: CONTACT GUARD ASSIST
ENTERING_EXITING_HOME: CONTACT GUARD ASSIST
OASIS_M1830: 05
PHYSICAL_TRANSFERS_DEVICES: STANDARD WALKER
AMBULATION ASSISTANCE: STAND BY ASSIST
CURRENT_FUNCTION: ONE PERSON
AMBULATION ASSISTANCE: ONE PERSON

## 2024-10-22 ASSESSMENT — ENCOUNTER SYMPTOMS
OCCASIONAL FEELINGS OF UNSTEADINESS: 0
SUBJECTIVE PAIN PROGRESSION: GRADUALLY IMPROVING
PAIN: 1
PAIN LOCATION: RIGHT HIP
LOWEST PAIN SEVERITY IN PAST 24 HOURS: 2/10
PERSON REPORTING PAIN: PATIENT
HIGHEST PAIN SEVERITY IN PAST 24 HOURS: 5/10
HYPERTENSION: 1
MUSCLE WEAKNESS: 1
PAIN SEVERITY GOAL: 3/10

## 2024-10-24 DIAGNOSIS — E78.5 HYPERLIPIDEMIA, UNSPECIFIED HYPERLIPIDEMIA TYPE: ICD-10-CM

## 2024-10-24 RX ORDER — ATORVASTATIN CALCIUM 20 MG/1
20 TABLET, FILM COATED ORAL DAILY
Qty: 90 TABLET | Refills: 1 | Status: SHIPPED | OUTPATIENT
Start: 2024-10-24

## 2024-10-25 ENCOUNTER — HOME CARE VISIT (OUTPATIENT)
Dept: HOME HEALTH SERVICES | Facility: HOME HEALTH | Age: 69
End: 2024-10-25
Payer: MEDICARE

## 2024-10-25 VITALS
SYSTOLIC BLOOD PRESSURE: 124 MMHG | TEMPERATURE: 97.8 F | HEART RATE: 74 BPM | OXYGEN SATURATION: 96 % | RESPIRATION RATE: 18 BRPM | DIASTOLIC BLOOD PRESSURE: 70 MMHG

## 2024-10-25 PROCEDURE — G0151 HHCP-SERV OF PT,EA 15 MIN: HCPCS

## 2024-10-25 SDOH — HEALTH STABILITY: PHYSICAL HEALTH
EXERCISE COMMENTS: R LE THR PROTOCOL EXERCISES IN SUPINE AND SITTING POS., 10 TO 15 REPS X 1 SET EA EXER. WITH INSTR. FOR PROPER PACE AND FULL AVAILABLE ROM FOR EACH EXERCISE.

## 2024-10-25 ASSESSMENT — ACTIVITIES OF DAILY LIVING (ADL)
CURRENT_FUNCTION: ONE PERSON
AMBULATION ASSISTANCE: 1
AMBULATION ASSISTANCE: ONE PERSON
AMBULATION_DISTANCE/DURATION_TOLERATED: 120 FEET X 2
PHYSICAL TRANSFERS ASSESSED: 1
AMBULATION ASSISTANCE ON FLAT SURFACES: 1
AMBULATION ASSISTANCE: STAND BY ASSIST
CURRENT_FUNCTION: STAND BY ASSIST
PHYSICAL_TRANSFERS_DEVICES: STANDARD WALKER

## 2024-10-25 ASSESSMENT — ENCOUNTER SYMPTOMS
LOWEST PAIN SEVERITY IN PAST 24 HOURS: 2/10
SUBJECTIVE PAIN PROGRESSION: GRADUALLY IMPROVING
OCCASIONAL FEELINGS OF UNSTEADINESS: 0
PERSON REPORTING PAIN: PATIENT
MUSCLE WEAKNESS: 1
PAIN: 1
HIGHEST PAIN SEVERITY IN PAST 24 HOURS: 5/10
PAIN SEVERITY GOAL: 3/10
PAIN LOCATION: RIGHT HIP

## 2024-10-29 ENCOUNTER — HOME CARE VISIT (OUTPATIENT)
Dept: HOME HEALTH SERVICES | Facility: HOME HEALTH | Age: 69
End: 2024-10-29
Payer: MEDICARE

## 2024-10-29 VITALS
TEMPERATURE: 98.4 F | DIASTOLIC BLOOD PRESSURE: 68 MMHG | HEART RATE: 74 BPM | SYSTOLIC BLOOD PRESSURE: 116 MMHG | OXYGEN SATURATION: 98 % | RESPIRATION RATE: 18 BRPM

## 2024-10-29 PROCEDURE — G0151 HHCP-SERV OF PT,EA 15 MIN: HCPCS

## 2024-10-29 SDOH — HEALTH STABILITY: PHYSICAL HEALTH
EXERCISE COMMENTS: R LE THA PROTOCOL EXER.S IN SUPINE, SITTING AND STANDING POS., 10 TO 15 REPS X 1 SET EA EXER. WITH INSTR. FOR PROPER PACE AND FULL AVAILABLE ROM FOR EACH EXERCISE.

## 2024-10-29 ASSESSMENT — ACTIVITIES OF DAILY LIVING (ADL)
AMBULATION ASSISTANCE ON FLAT SURFACES: 1
AMBULATION ASSISTANCE: SUPERVISION
AMBULATION ASSISTANCE: ONE PERSON
CURRENT_FUNCTION: ONE PERSON
PHYSICAL TRANSFERS ASSESSED: 1
PHYSICAL_TRANSFERS_DEVICES: WHEELED WALKER
CURRENT_FUNCTION: SUPERVISION
AMBULATION_DISTANCE/DURATION_TOLERATED: 175 FEET X 2
AMBULATION ASSISTANCE: 1

## 2024-10-29 ASSESSMENT — ENCOUNTER SYMPTOMS
PAIN: 1
PERSON REPORTING PAIN: PATIENT
PAIN SEVERITY GOAL: 3/10
MUSCLE WEAKNESS: 1
PAIN LOCATION: RIGHT HIP
SUBJECTIVE PAIN PROGRESSION: GRADUALLY IMPROVING
LOWEST PAIN SEVERITY IN PAST 24 HOURS: 4/10
HIGHEST PAIN SEVERITY IN PAST 24 HOURS: 7/10
OCCASIONAL FEELINGS OF UNSTEADINESS: 0

## 2024-10-31 ENCOUNTER — OFFICE VISIT (OUTPATIENT)
Dept: GASTROENTEROLOGY | Facility: CLINIC | Age: 69
End: 2024-10-31
Payer: MEDICARE

## 2024-10-31 VITALS
HEIGHT: 63 IN | TEMPERATURE: 97.3 F | HEART RATE: 56 BPM | DIASTOLIC BLOOD PRESSURE: 85 MMHG | WEIGHT: 153 LBS | SYSTOLIC BLOOD PRESSURE: 136 MMHG | BODY MASS INDEX: 27.11 KG/M2

## 2024-10-31 DIAGNOSIS — Z71.2 ENCOUNTER TO DISCUSS TEST RESULTS: ICD-10-CM

## 2024-10-31 DIAGNOSIS — Z86.19 HEPATITIS C VIRUS INFECTION CURED AFTER ANTIVIRAL DRUG THERAPY: Primary | ICD-10-CM

## 2024-10-31 DIAGNOSIS — Z71.6 ENCOUNTER FOR SMOKING CESSATION COUNSELING: ICD-10-CM

## 2024-10-31 DIAGNOSIS — K82.4 GALLBLADDER POLYP: ICD-10-CM

## 2024-10-31 DIAGNOSIS — K74.00 LIVER FIBROSIS: ICD-10-CM

## 2024-10-31 PROCEDURE — 99406 BEHAV CHNG SMOKING 3-10 MIN: CPT | Performed by: INTERNAL MEDICINE

## 2024-10-31 PROCEDURE — 99213 OFFICE O/P EST LOW 20 MIN: CPT | Performed by: INTERNAL MEDICINE

## 2024-10-31 RX ORDER — HYDROCODONE BITARTRATE AND ACETAMINOPHEN 5; 325 MG/1; MG/1
TABLET ORAL
COMMUNITY
Start: 2024-10-28

## 2024-10-31 RX ORDER — IBUPROFEN 800 MG/1
800 TABLET ORAL EVERY 8 HOURS PRN
COMMUNITY

## 2024-10-31 ASSESSMENT — ENCOUNTER SYMPTOMS
GASTROINTESTINAL NEGATIVE: 1
CARDIOVASCULAR NEGATIVE: 1
CONSTITUTIONAL NEGATIVE: 1
ENDOCRINE NEGATIVE: 1
EYES NEGATIVE: 1

## 2024-11-01 ENCOUNTER — HOME CARE VISIT (OUTPATIENT)
Dept: HOME HEALTH SERVICES | Facility: HOME HEALTH | Age: 69
End: 2024-11-01
Payer: MEDICARE

## 2024-11-01 VITALS
OXYGEN SATURATION: 98 % | RESPIRATION RATE: 18 BRPM | DIASTOLIC BLOOD PRESSURE: 68 MMHG | HEART RATE: 72 BPM | SYSTOLIC BLOOD PRESSURE: 118 MMHG | TEMPERATURE: 98.6 F

## 2024-11-01 PROCEDURE — G0151 HHCP-SERV OF PT,EA 15 MIN: HCPCS

## 2024-11-01 ASSESSMENT — ENCOUNTER SYMPTOMS
PAIN LOCATION: RIGHT HIP
PERSON REPORTING PAIN: PATIENT
LOWEST PAIN SEVERITY IN PAST 24 HOURS: 1/10
HIGHEST PAIN SEVERITY IN PAST 24 HOURS: 4/10
PAIN: 1
OCCASIONAL FEELINGS OF UNSTEADINESS: 0
SUBJECTIVE PAIN PROGRESSION: GRADUALLY IMPROVING
OCCASIONAL FEELINGS OF UNSTEADINESS: 0
PAIN SEVERITY GOAL: 3/10

## 2024-11-01 ASSESSMENT — ACTIVITIES OF DAILY LIVING (ADL)
OASIS_M1830: 01
HOME_HEALTH_OASIS: 00
ADLS_COMMENTS: PT. INDEP. WITH ADLS AND IADLS AT THIS TIME.

## 2024-11-12 ENCOUNTER — APPOINTMENT (OUTPATIENT)
Dept: OTOLARYNGOLOGY | Facility: CLINIC | Age: 69
End: 2024-11-12
Payer: MEDICARE

## 2024-11-12 ENCOUNTER — OFFICE VISIT (OUTPATIENT)
Dept: OTOLARYNGOLOGY | Facility: CLINIC | Age: 69
End: 2024-11-12
Payer: MEDICARE

## 2024-11-12 VITALS — BODY MASS INDEX: 26.22 KG/M2 | HEIGHT: 63 IN | TEMPERATURE: 97.1 F | WEIGHT: 148 LBS

## 2024-11-12 DIAGNOSIS — K13.21 LEUKOPLAKIA, TONGUE: ICD-10-CM

## 2024-11-12 DIAGNOSIS — K14.9 TONGUE DYSPLASIA: Primary | ICD-10-CM

## 2024-11-12 DIAGNOSIS — F17.200 SMOKER: ICD-10-CM

## 2024-11-12 PROCEDURE — 1159F MED LIST DOCD IN RCRD: CPT | Performed by: OTOLARYNGOLOGY

## 2024-11-12 PROCEDURE — 3008F BODY MASS INDEX DOCD: CPT | Performed by: OTOLARYNGOLOGY

## 2024-11-12 PROCEDURE — 4004F PT TOBACCO SCREEN RCVD TLK: CPT | Performed by: OTOLARYNGOLOGY

## 2024-11-12 PROCEDURE — 99214 OFFICE O/P EST MOD 30 MIN: CPT | Performed by: OTOLARYNGOLOGY

## 2024-11-12 NOTE — PROGRESS NOTES
Chief Complaint   Patient presents with    Follow-up     LOV 7/24 F/U THYROID DOING GOOD     HPI:  Luz Castillo is a 69 y.o. female who presents in follow-up today for continued evaluation and a 4-month check of chronic left lateral tongue lesion initially seen and biopsied in May 2022.   Also just recently biopsied July 10, 2024.  This showed severe dysplasia.  She is elected observation over surgical removal.  She is aware the risks of this approach.  She reports no change. No sore throat, hoarseness, dysphagia, neck pain, neck mass.  Continues to do well without any symptoms.  She is doing well without any pain. Only occasionally she will notice it when of some hard food touches it or pokes into it.    Bx 5/2022 showed some chronic inflammation and mild atypia. No obvious dysplasia or malignancy. Biopsy per report was adequate but superficial and malignancy cannot be entirely ruled out.        She is a smoker.  She has been on Chantix recently and is now down to half a pack a day.  Having some nausea related to this.  October 2024 had hip replacement surgery.  Recovering from that.  Was found to have abnormal EKG and is having a stress test as well as an echocardiogram coming up.    7/10/24 Path:  FINAL DIAGNOSIS   Tongue, left ventrolateral, biopsy:  - Severe dysplasia with verrucous hyperkeratosis, see note.     Note: Severe dysplasia involves both tissue fragments.       5/2022 Path:  TONGUE, LEFT LATERAL, EXCISION:        CHRONICALLY INFLAMED SQUAMOUS MUCOSA SHOWING PROLIFERATIVE CHANGES             WITH MILD ATYPIA,      HYPERKERATOSIS AND PARAKERATOSIS, SEE   COMMENT.     Comment: The excised tissue is relatively superficial and the lesion is   partially transected at the deep aspect and the base of the lesion is   not entirely represented. A neoplastic process is not excluded. Given   the clinical findings of mass, the current biopsy may not be   representative of the pathologic process. Clinical  correlation is   recommended.   PMH:  Past Medical History:   Diagnosis Date    Alcohol dependence, in remission 12/26/2019    History of alcoholism    Alcohol dependence, in remission 09/20/2021    History of alcoholism    Anxiety disorder, unspecified     Anxiety and depression    Chronic viral hepatitis C (Multi) 11/29/2021    Chronic viral hepatitis C    Hepatitis C     Osteopenia     Other long term (current) drug therapy 05/06/2021    High risk medication use    Personal history of diseases of the blood and blood-forming organs and certain disorders involving the immune mechanism     History of autoimmune disorder    Personal history of other diseases of the circulatory system     History of hypertension    Personal history of other diseases of the digestive system 09/20/2021    History of gastroesophageal reflux (GERD)    Personal history of other diseases of the female genital tract     History of endometriosis    Personal history of other endocrine, nutritional and metabolic disease 03/10/2021    History of goiter    Personal history of other infectious and parasitic diseases 03/17/2022    History of hepatitis C virus infection     Past Surgical History:   Procedure Laterality Date    MR HEAD ANGIO WO IV CONTRAST  1/12/2023    MR HEAD ANGIO WO IV CONTRAST 1/12/2023 [facility]    MR HEAD ANGIO WO IV CONTRAST  4/19/2018    MR HEAD ANGIO WO IV CONTRAST [facility]    MR NECK ANGIO WO IV CONTRAST  1/12/2023    MR NECK ANGIO WO IV CONTRAST 1/12/2023 [facility]    OTHER SURGICAL HISTORY  11/11/2021    Appendectomy    OTHER SURGICAL HISTORY  11/11/2021    Hernia repair    OTHER SURGICAL HISTORY  11/11/2021    Laparoscopy    OTHER SURGICAL HISTORY  11/11/2021    Shoulder replacement    OTHER SURGICAL HISTORY  11/11/2021    Shoulder arthroscopy    OTHER SURGICAL HISTORY  11/11/2021    Tubal ligation    OTHER SURGICAL HISTORY  11/11/2021    Colonic polypectomy    OTHER SURGICAL HISTORY   11/11/2021    Colonoscopy         Medications:     Current Outpatient Medications:     aspirin 81 mg EC tablet, Take 1 tablet (81 mg) by mouth once daily., Disp: , Rfl:     atorvastatin (Lipitor) 20 mg tablet, Take 1 tablet (20 mg) by mouth once daily., Disp: 90 tablet, Rfl: 1    b complex vitamins capsule, Take 1 capsule by mouth once daily., Disp: , Rfl:     calcium carbonate 600 mg calcium (1,500 mg) tablet, Take 1,200 mg by mouth once daily., Disp: , Rfl:     cholecalciferol (Vitamin D-3) 25 MCG (1000 UT) capsule, Take 2 capsules (50 mcg) by mouth once daily., Disp: , Rfl:     folic acid (Folvite) 1 mg tablet, Take 1 tablet (1 mg) by mouth once daily., Disp: , Rfl:     HYDROcodone-acetaminophen (Norco) 5-325 mg tablet, TAKE TWO TABLETS BY MOUTH EVERY 8 HOURS. AS NEEDED FOR PAIN, Disp: , Rfl:     ibuprofen 800 mg tablet, Take 1 tablet (800 mg) by mouth every 8 hours if needed for mild pain (1 - 3)., Disp: , Rfl:     levothyroxine (Synthroid, Levoxyl) 100 mcg tablet, take 1 tablet by mouth 6 days per week, Disp: 90 tablet, Rfl: 1    lisinopril 20 mg tablet, TAKE ONE TABLET BY MOUTH EVERY DAY, Disp: 90 tablet, Rfl: 0    magnesium glycinate 100 mg magnesium capsule, Take 1 capsule (100 mg) by mouth once daily., Disp: , Rfl:     multivit-min/ferrous gluconate (CENTAMIN ORAL), Take 1 capsule by mouth early in the morning.., Disp: , Rfl:     olopatadine (Pataday) 0.2 % ophthalmic solution, Administer 1 drop into both eyes once daily., Disp: , Rfl:     omega 3-dha-epa-fish oil 360 mg-108 mg- 180 mg-1,200 mg capsule, Take 1 capsule by mouth once daily., Disp: , Rfl:     ondansetron (Zofran) 4 mg tablet, Take 1 tablet (4 mg) by mouth every 8 hours if needed for nausea or vomiting., Disp: 40 tablet, Rfl: 1    pantoprazole (ProtoNix) 40 mg EC tablet, TAKE ONE TABLET BY MOUTH TWO TIMES A DAY BEFORE MEALS. TAKE 30 MINUTES BEFORE BREAKFAST AND DINNER., Disp: 180 tablet, Rfl: 1    TURMERIC ORAL, Take 500 mg by mouth once  "daily., Disp: , Rfl:     varenicline (Chantix) 0.5 mg tablet, Take 1 tablet (0.5 mg) by mouth see administration instructions. Take one tablet by mouth once daily for days 1-3 then take one tablet by mouth twice daily for days 4-7, Disp: 11 tablet, Rfl: 0    varenicline (Chantix) 1 mg tablet, Take 1 tablet (1 mg) by mouth 2 times a day., Disp: 90 tablet, Rfl: 1    acetaminophen (Tylenol) 500 mg tablet, Take 1 tablet by mouth every 6 hours. (Patient not taking: Reported on 11/12/2024), Disp: , Rfl:     amoxicillin (Amoxil) 500 mg capsule, Take 4 tablets one time for dental procedure (Patient not taking: Reported on 11/12/2024), Disp: 4 capsule, Rfl: 0     Allergies:  Allergies   Allergen Reactions    Celecoxib Unknown     Comments: heart races    Diphenhydramine Unknown    Iodine Itching and Swelling    Lipitor [Atorvastatin] Hives and Unknown     Pt. Is allergic to specifically Lipitor    Pramipexole Unknown    Rofecoxib Other     tacjycardia        ROS:  Review of systems normal unless stated otherwise in the HPI and/or PMH.    Physical Exam:  Temperature 36.2 °C (97.1 °F), height 1.6 m (5' 3\"), weight 67.1 kg (148 lb). Body mass index is 26.22 kg/m².     GENERAL APPEARANCE: Well developed and well nourished.  Alert and oriented in no acute distress.  Normal vocal quality.      HEAD/FACE: No erythema or edema or facial tenderness.  Normal facial nerve function bilaterally.    EAR:       EXTERNAL: Normal pinnas and external auditory canals without lesion or obstructing wax.       MIDDLE EAR: Tympanic membranes intact and mobile with normal landmarks.  Middle ear space appears well aerated.       TUBE STATUS: N/A       MASTOID CAVITY: N/A       HEARING: Gross hearing assessment is within normal limits.      NOSE:       VISUALIZED USING: Anterior rhinoscopy with headlight and nasal speculum.       DORSUM: Midline, nontraumatic appearance.       MUCOSA: Normal-appearing.       SECRETIONS: Normal.       SEPTUM: Midline " and nonobstructing.       INFERIOR TURBINATES: Normal.       MIDDLE TURBINATES/MEATUS: N/A       BLEEDING: N/A         ORAL CAVITY/PHARYNX:       TEETH: Adequate dentition.       TONGUE: No change in the approximate 2 x 4 cm left lateral tongue plaque-like area of leukoplakia.  Without any palpable depth.  No ulceration.  No tenderness.  Normal mobility.  No change.        FLOOR OF MOUTH: No mass or lesion.       PALATE: Normal hard palate, soft palate, and uvula.       OROPHARYNX: Normal without mass or lesion.       BUCCAL MUCOSA/GBS: Normal without mass or lesion.       LIPS: Normal.    LARYNX/HYPOPHARYNX/NASOPHARYNX: N/A    NECK: No palpable masses or abnormal adenopathy.  Trachea is midline.    THYROID: No thyromegaly or palpable nodule.    SALIVARY GLANDS: Normal bilateral parotid and submandibular glands by inspection and palpation.    TMJ's: Normal.    NEURO: Cranial nerve exam grossly normal bilaterally.       Assessment/Plan   Luz was seen today for follow-up.  Diagnoses and all orders for this visit:  Tongue dysplasia (Primary)  Leukoplakia, tongue  Smoker    Provide education support about the diagnosis of the dysplasia and that this is a precancerous lesion.  This certainly can develop into malignancy in the future and cause some significant problems.  We did go over options of watchful waiting with close clinical observation versus surgical removal versus second opinion from my head and neck colleagues at [facility] [place].  At this point knowing the pros and cons of each approach including the risk of delayed diagnosis and morbidity and mortality associated with that, she does wish to continue active observation and does not wish any invasive procedure or consultation at this time.  I will see her again in 4 months or sooner with any changes such as but not limited to pain, ulceration, neck mass.    Doing a great job quitting smoking.  Recovering from recent hip surgery and now having cardiac  evaluation.  States will need her gallbladder out next year as well.  Follow up in about 4 months (around 3/12/2025) for sooner with changes or worsening symptoms.     Toy Gallardo MD

## 2024-12-02 DIAGNOSIS — I10 PRIMARY HYPERTENSION: ICD-10-CM

## 2024-12-02 DIAGNOSIS — K21.9 GASTROESOPHAGEAL REFLUX DISEASE WITHOUT ESOPHAGITIS: ICD-10-CM

## 2024-12-02 DIAGNOSIS — R11.0 NAUSEA: ICD-10-CM

## 2024-12-02 DIAGNOSIS — E03.9 HYPOTHYROIDISM, UNSPECIFIED TYPE: ICD-10-CM

## 2024-12-02 RX ORDER — LEVOTHYROXINE SODIUM 100 UG/1
TABLET ORAL
Qty: 90 TABLET | Refills: 1 | Status: SHIPPED | OUTPATIENT
Start: 2024-12-02

## 2024-12-02 RX ORDER — LISINOPRIL 20 MG/1
20 TABLET ORAL DAILY
Qty: 90 TABLET | Refills: 0 | OUTPATIENT
Start: 2024-12-02

## 2024-12-02 RX ORDER — LISINOPRIL 20 MG/1
20 TABLET ORAL DAILY
Qty: 90 TABLET | Refills: 1 | Status: SHIPPED | OUTPATIENT
Start: 2024-12-02

## 2024-12-02 RX ORDER — PANTOPRAZOLE SODIUM 40 MG/1
40 TABLET, DELAYED RELEASE ORAL
Qty: 90 TABLET | Refills: 1 | Status: SHIPPED | OUTPATIENT
Start: 2024-12-02

## 2024-12-03 ENCOUNTER — APPOINTMENT (OUTPATIENT)
Dept: OTOLARYNGOLOGY | Facility: CLINIC | Age: 69
End: 2024-12-03
Payer: MEDICARE

## 2024-12-04 RX ORDER — ONDANSETRON 4 MG/1
TABLET, FILM COATED ORAL
Qty: 40 TABLET | Refills: 0 | Status: SHIPPED | OUTPATIENT
Start: 2024-12-04

## 2024-12-10 ENCOUNTER — APPOINTMENT (OUTPATIENT)
Dept: OTOLARYNGOLOGY | Facility: CLINIC | Age: 69
End: 2024-12-10
Payer: MEDICARE

## 2024-12-23 ENCOUNTER — TELEPHONE (OUTPATIENT)
Dept: PRIMARY CARE | Facility: CLINIC | Age: 69
End: 2024-12-23
Payer: MEDICARE

## 2024-12-23 DIAGNOSIS — F17.210 CIGARETTE NICOTINE DEPENDENCE WITHOUT COMPLICATION: ICD-10-CM

## 2024-12-23 RX ORDER — VARENICLINE TARTRATE 1 MG/1
1 TABLET, FILM COATED ORAL 2 TIMES DAILY
Qty: 90 TABLET | Refills: 1 | Status: SHIPPED | OUTPATIENT
Start: 2024-12-23

## 2024-12-23 NOTE — TELEPHONE ENCOUNTER
MEDICATION REFILL    Pharmacy Name:   [facility]  Medication requested               Varenicline   1 mg tablet  Dosage     2 x daily  Quantity   90 days  (180 tabs)  Allergies    none given  Date of last visit    09/20/2024 (Dr Higuera)  Date of Next Appointment    04/18/2025                                                  (VIKY, CNP)

## 2024-12-27 ENCOUNTER — APPOINTMENT (OUTPATIENT)
Dept: RADIOLOGY | Facility: HOSPITAL | Age: 69
End: 2024-12-27
Payer: MEDICARE

## 2024-12-27 ENCOUNTER — HOSPITAL ENCOUNTER (EMERGENCY)
Facility: HOSPITAL | Age: 69
Discharge: HOME | End: 2024-12-27
Attending: EMERGENCY MEDICINE
Payer: MEDICARE

## 2024-12-27 ENCOUNTER — APPOINTMENT (OUTPATIENT)
Dept: CARDIOLOGY | Facility: HOSPITAL | Age: 69
End: 2024-12-27
Payer: MEDICARE

## 2024-12-27 VITALS
OXYGEN SATURATION: 96 % | BODY MASS INDEX: 25.34 KG/M2 | HEART RATE: 62 BPM | RESPIRATION RATE: 18 BRPM | TEMPERATURE: 97.7 F | HEIGHT: 63 IN | SYSTOLIC BLOOD PRESSURE: 121 MMHG | DIASTOLIC BLOOD PRESSURE: 61 MMHG | WEIGHT: 143 LBS

## 2024-12-27 DIAGNOSIS — R42 DIZZINESS: Primary | ICD-10-CM

## 2024-12-27 LAB
ALBUMIN SERPL BCP-MCNC: 4 G/DL (ref 3.4–5)
ALP SERPL-CCNC: 43 U/L (ref 33–136)
ALT SERPL W P-5'-P-CCNC: 23 U/L (ref 7–45)
ANION GAP SERPL CALC-SCNC: 19 MMOL/L (ref 10–20)
APTT PPP: 30 SECONDS (ref 27–38)
AST SERPL W P-5'-P-CCNC: 20 U/L (ref 9–39)
BASOPHILS # BLD AUTO: 0.03 X10*3/UL (ref 0–0.1)
BASOPHILS NFR BLD AUTO: 0.7 %
BILIRUB SERPL-MCNC: 0.3 MG/DL (ref 0–1.2)
BUN SERPL-MCNC: 11 MG/DL (ref 6–23)
CALCIUM SERPL-MCNC: 8.8 MG/DL (ref 8.6–10.3)
CARDIAC TROPONIN I PNL SERPL HS: 3 NG/L (ref 0–13)
CHLORIDE SERPL-SCNC: 105 MMOL/L (ref 98–107)
CO2 SERPL-SCNC: 21 MMOL/L (ref 21–32)
CREAT SERPL-MCNC: 0.78 MG/DL (ref 0.5–1.05)
EGFRCR SERPLBLD CKD-EPI 2021: 82 ML/MIN/1.73M*2
EOSINOPHIL # BLD AUTO: 0.19 X10*3/UL (ref 0–0.7)
EOSINOPHIL NFR BLD AUTO: 4.4 %
ERYTHROCYTE [DISTWIDTH] IN BLOOD BY AUTOMATED COUNT: 12.5 % (ref 11.5–14.5)
FLUAV RNA RESP QL NAA+PROBE: NOT DETECTED
FLUBV RNA RESP QL NAA+PROBE: NOT DETECTED
GLUCOSE BLD MANUAL STRIP-MCNC: 102 MG/DL (ref 74–99)
GLUCOSE SERPL-MCNC: 106 MG/DL (ref 74–99)
HCT VFR BLD AUTO: 38.5 % (ref 36–46)
HGB BLD-MCNC: 13.2 G/DL (ref 12–16)
IMM GRANULOCYTES # BLD AUTO: 0.02 X10*3/UL (ref 0–0.7)
IMM GRANULOCYTES NFR BLD AUTO: 0.5 % (ref 0–0.9)
INR PPP: 1 (ref 0.9–1.1)
LYMPHOCYTES # BLD AUTO: 1.1 X10*3/UL (ref 1.2–4.8)
LYMPHOCYTES NFR BLD AUTO: 25.3 %
MCH RBC QN AUTO: 32.2 PG (ref 26–34)
MCHC RBC AUTO-ENTMCNC: 34.3 G/DL (ref 32–36)
MCV RBC AUTO: 94 FL (ref 80–100)
MONOCYTES # BLD AUTO: 0.38 X10*3/UL (ref 0.1–1)
MONOCYTES NFR BLD AUTO: 8.7 %
NEUTROPHILS # BLD AUTO: 2.63 X10*3/UL (ref 1.2–7.7)
NEUTROPHILS NFR BLD AUTO: 60.4 %
NRBC BLD-RTO: 0 /100 WBCS (ref 0–0)
PLATELET # BLD AUTO: 233 X10*3/UL (ref 150–450)
POCT GLUCOSE: 102 MG/DL (ref 74–99)
POTASSIUM SERPL-SCNC: 4.1 MMOL/L (ref 3.5–5.3)
PROT SERPL-MCNC: 6.5 G/DL (ref 6.4–8.2)
PROTHROMBIN TIME: 11 SECONDS (ref 9.8–12.8)
RBC # BLD AUTO: 4.1 X10*6/UL (ref 4–5.2)
SARS-COV-2 RNA RESP QL NAA+PROBE: NOT DETECTED
SODIUM SERPL-SCNC: 141 MMOL/L (ref 136–145)
WBC # BLD AUTO: 4.4 X10*3/UL (ref 4.4–11.3)

## 2024-12-27 PROCEDURE — 93005 ELECTROCARDIOGRAM TRACING: CPT

## 2024-12-27 PROCEDURE — 99285 EMERGENCY DEPT VISIT HI MDM: CPT | Mod: 25 | Performed by: EMERGENCY MEDICINE

## 2024-12-27 PROCEDURE — 85610 PROTHROMBIN TIME: CPT | Performed by: EMERGENCY MEDICINE

## 2024-12-27 PROCEDURE — 36415 COLL VENOUS BLD VENIPUNCTURE: CPT | Performed by: EMERGENCY MEDICINE

## 2024-12-27 PROCEDURE — 85025 COMPLETE CBC W/AUTO DIFF WBC: CPT | Performed by: EMERGENCY MEDICINE

## 2024-12-27 PROCEDURE — 70450 CT HEAD/BRAIN W/O DYE: CPT | Performed by: RADIOLOGY

## 2024-12-27 PROCEDURE — 80053 COMPREHEN METABOLIC PANEL: CPT | Performed by: EMERGENCY MEDICINE

## 2024-12-27 PROCEDURE — 2500000004 HC RX 250 GENERAL PHARMACY W/ HCPCS (ALT 636 FOR OP/ED): Performed by: EMERGENCY MEDICINE

## 2024-12-27 PROCEDURE — 96374 THER/PROPH/DIAG INJ IV PUSH: CPT

## 2024-12-27 PROCEDURE — 85730 THROMBOPLASTIN TIME PARTIAL: CPT | Performed by: EMERGENCY MEDICINE

## 2024-12-27 PROCEDURE — 2500000001 HC RX 250 WO HCPCS SELF ADMINISTERED DRUGS (ALT 637 FOR MEDICARE OP): Performed by: EMERGENCY MEDICINE

## 2024-12-27 PROCEDURE — 87636 SARSCOV2 & INF A&B AMP PRB: CPT | Performed by: EMERGENCY MEDICINE

## 2024-12-27 PROCEDURE — 82947 ASSAY GLUCOSE BLOOD QUANT: CPT | Mod: 59

## 2024-12-27 PROCEDURE — 70450 CT HEAD/BRAIN W/O DYE: CPT

## 2024-12-27 PROCEDURE — 84484 ASSAY OF TROPONIN QUANT: CPT | Performed by: EMERGENCY MEDICINE

## 2024-12-27 RX ORDER — PROCHLORPERAZINE EDISYLATE 5 MG/ML
5 INJECTION INTRAMUSCULAR; INTRAVENOUS ONCE
Status: COMPLETED | OUTPATIENT
Start: 2024-12-27 | End: 2024-12-27

## 2024-12-27 RX ORDER — ACETAMINOPHEN 325 MG/1
650 TABLET ORAL ONCE
Status: COMPLETED | OUTPATIENT
Start: 2024-12-27 | End: 2024-12-27

## 2024-12-27 RX ADMIN — PROCHLORPERAZINE EDISYLATE 5 MG: 5 INJECTION INTRAMUSCULAR; INTRAVENOUS at 06:48

## 2024-12-27 RX ADMIN — ACETAMINOPHEN 650 MG: 325 TABLET, FILM COATED ORAL at 09:17

## 2024-12-27 ASSESSMENT — COLUMBIA-SUICIDE SEVERITY RATING SCALE - C-SSRS
2. HAVE YOU ACTUALLY HAD ANY THOUGHTS OF KILLING YOURSELF?: NO
1. IN THE PAST MONTH, HAVE YOU WISHED YOU WERE DEAD OR WISHED YOU COULD GO TO SLEEP AND NOT WAKE UP?: NO
6. HAVE YOU EVER DONE ANYTHING, STARTED TO DO ANYTHING, OR PREPARED TO DO ANYTHING TO END YOUR LIFE?: NO

## 2024-12-27 ASSESSMENT — LIFESTYLE VARIABLES
EVER HAD A DRINK FIRST THING IN THE MORNING TO STEADY YOUR NERVES TO GET RID OF A HANGOVER: NO
HAVE PEOPLE ANNOYED YOU BY CRITICIZING YOUR DRINKING: NO
HAVE YOU EVER FELT YOU SHOULD CUT DOWN ON YOUR DRINKING: NO
EVER FELT BAD OR GUILTY ABOUT YOUR DRINKING: NO
TOTAL SCORE: 0

## 2024-12-27 ASSESSMENT — PAIN - FUNCTIONAL ASSESSMENT: PAIN_FUNCTIONAL_ASSESSMENT: 0-10

## 2024-12-27 ASSESSMENT — ACTIVITIES OF DAILY LIVING (ADL): LACK_OF_TRANSPORTATION: NO

## 2024-12-27 ASSESSMENT — PAIN SCALES - GENERAL
PAINLEVEL_OUTOF10: 0 - NO PAIN
PAINLEVEL_OUTOF10: 0 - NO PAIN

## 2024-12-27 NOTE — ED PROVIDER NOTES
Luz Castillo  69 y.o.    HPI  Presents to the ED with concern for dizziness.  Patient states last known well was around 1 AM when she woke up in the middle of the night.  She states she was herself at that point.  She woke up around 4 AM and noted that she was dizzy.  Associated nausea.  Dull somewhat more frontal headache.  No diarrhea.  No fevers.  No recent illness.  She states she is a little bit unsteady when she walks.  Her boyfriend drove her in.  She denies any slurred speech or drooling.  No unilateral weakness.  She reports a history of TIA.      Patient History   Past Medical History:   Diagnosis Date    Alcohol dependence, in remission 12/26/2019    History of alcoholism    Alcohol dependence, in remission 09/20/2021    History of alcoholism    Anxiety disorder, unspecified     Anxiety and depression    Chronic viral hepatitis C (Multi) 11/29/2021    Chronic viral hepatitis C    Hepatitis C     Osteopenia     Other long term (current) drug therapy 05/06/2021    High risk medication use    Personal history of diseases of the blood and blood-forming organs and certain disorders involving the immune mechanism     History of autoimmune disorder    Personal history of other diseases of the circulatory system     History of hypertension    Personal history of other diseases of the digestive system 09/20/2021    History of gastroesophageal reflux (GERD)    Personal history of other diseases of the female genital tract     History of endometriosis    Personal history of other endocrine, nutritional and metabolic disease 03/10/2021    History of goiter    Personal history of other infectious and parasitic diseases 03/17/2022    History of hepatitis C virus infection     Past Surgical History:   Procedure Laterality Date    MR HEAD ANGIO WO IV CONTRAST  1/12/2023    MR HEAD ANGIO WO IV CONTRAST 1/12/2023 [facility]    MR HEAD ANGIO WO IV CONTRAST  4/19/2018    MR HEAD ANGIO WO IV CONTRAST [facility]    MR NECK ANGIO WO IV CONTRAST  1/12/2023    MR NECK ANGIO WO IV CONTRAST 1/12/2023 [facility]    OTHER SURGICAL HISTORY  11/11/2021    Appendectomy    OTHER SURGICAL HISTORY  11/11/2021    Hernia repair    OTHER SURGICAL HISTORY  11/11/2021    Laparoscopy    OTHER SURGICAL HISTORY  11/11/2021    Shoulder replacement    OTHER SURGICAL HISTORY  11/11/2021    Shoulder arthroscopy    OTHER SURGICAL HISTORY  11/11/2021    Tubal ligation    OTHER SURGICAL HISTORY  11/11/2021    Colonic polypectomy    OTHER SURGICAL HISTORY  11/11/2021    Colonoscopy     Family History   Problem Relation Name Age of Onset    Heart disease Father      Diabetes Father       Social History     Tobacco Use    Smoking status: Every Day     Current packs/day: 1.00     Types: Cigarettes    Smokeless tobacco: Never   Vaping Use    Vaping status: Never Used   Substance Use Topics    Alcohol use: Never    Drug use: Never       Physical Exam   Vitals:    12/27/24 0616   BP: (!) 143/92   BP Location: Right arm   Patient Position: Sitting   Pulse: 59   Resp: 18   Temp: 36.3 °C (97.3 °F)   TempSrc: Temporal   SpO2: 95%   Weight: 64.9 kg (143 lb)   Height: 1.6 m (5' 3\")        Constitutional: NAD, non-toxic  Eyes: PERRL, Conjunctiva normal, No discharge  HEENT: Atraumatic. External ears appear normal, Nose is without drainage, MMM, no intraoral lesions  Neck: Normal ROM, No lymphadenopathy, No stridor  Cardiac: Regular rhythm and rate  Pulmonary/Chest: No respiratory distress, Normal breath sounds, No chest tenderness  Abdomen: BS present, Soft, Non-tender without rebound/guarding  Back: No tenderness, No contusions  Extremities: Normal ROM, No edema. No tenderness, intact distal pulses  Skin: Warm and dry, No rashes, No erythema  Neurologic: Alert and oriented x 3, Speech clear/fluent. Normal motor function, Normal sensation, No focal neurologic deficits.  Smile is symmetric.  Tongue protrudes midline.  Sensation to light " touch is intact across the face bilaterally.  Eyebrow raises symmetric.  Strong and equal shoulder shrug.  Strong and equal hand grasp bilaterally.  Strong and equal foot push pulls bilaterally.  Sensation to light touch is intact in the distal bilateral upper and lower extremities.  She does have mild bilateral horizontal nystagmus.  Psychiatric: Normal affect, Normal judgement, Normal mood      EKG interpreted by me: Sinus bradycardia at a rate of 56.  No ST segment elevation concerning for acute MI.  NJ is 144.  QTc is 386.    ED Course & MDM         This is a 69-year-old female with a history of hepatitis C and TIA who comes the emergency department with concern for dizziness that started about 4 AM.  Very minimal associated dull frontal headache.  No focal weakness slurred speech or drooling.  I saw the patient very shortly after her arrival due to concern for possible stroke.  She had no focal neurologic deficits and her NIH was 0.  Van negative.  Stroke workup was pursued.  Concern for posterior ischemia.  CT angio ordered as well-however patient reportedly has a contrast allergy.    I received a call from radiology that the head CT was nonacute.  Laboratory workup was unremarkable.  Monitor reassessed the patient and she is laying in bed and appears comfortable.  Breathing easily and conversant with a clear voice.  Moves all extremities spontaneously with good tone.  Reports that her symptoms are improved although not completely resolved.  At this point I am concerned for vertigo versus TIA/posterior cerebral ischemia.  I discussed this with the patient.  I recommended hospitalization for further neurologic monitoring care and evaluation.  I did explain that I cannot completely rule out stroke in the emergency department.  Patient declines hospitalization.  States that she has a significant other at home.  States she has antiemetics at home.  States that she really just does not want to stay in the hospital  and be more comfortable at home.  Patient was welcome back at any point for reevaluation should she become concerned about anything or develop any new symptoms.  She verbalizes understanding and agreement with this plan.  Nurse noted that the patient was up out of bed independently, steady.    Impression   Diagnoses as of 12/27/24 0911   Dizziness        Disposition  Discharge      IV Thrombolysis Given: No; Thrombolysis contraindication reason: Neurologic signs are mild and judged to be non-disabling NIH 0      The patient elected to leave the Emergency Department against medical advice. In my opinion, the patient has capacity to leave AMA. she is clinically sober, free from distracting injury, appears to have intact insight, judgment, and reason, and in my opinion has capacity to make decisions. I explained to her that these symptoms may represent a serious underlying medical condition and she verbalized understanding of my concerns and understands the consequences of leaving without complete evaluation.    I had a discussion with the patient about her workup and results, and informed her what the next step in diagnosis and treatment would be, and she verbalized understanding. I explained the risks of leaving without further workup or treatment, which included reasonably foreseeable complications such as death, serious injury, and permanent disability. I also offered alternatives to departing AMA.    I have asked her to return as soon as possible to complete her evaluation, and also explained that she is welcome to return to the ED for further evaluation whenever she chooses. I have asked her to follow up with her primary doctor as soon as possible. All of her questions were answered and she was given oral discharge instructions.         Christy Dsouza MD  12/27/24 0914

## 2024-12-27 NOTE — PROGRESS NOTES
12/27/24 0906   Discharge Planning   Living Arrangements Spouse/significant other  (Home with boyfriend Nii)   Support Systems Spouse/significant other   Assistance Needed A&OX4; independent with ADLs with no DME; drives; room air baseline and currently room air; PCP (new) Teresa Garcia CNP   Type of Residence Private residence   Number of Stairs to Enter Residence 14  (14 up and in)   Number of Stairs Within Residence 14  (14 down)   Do you have animals or pets at home? Yes   Type of Animals or Pets 1 dog   Who is requesting discharge planning? Provider   Expected Discharge Disposition Home  (Pt denies home going needs)   Does the patient need discharge transport arranged? No   Financial Resource Strain   How hard is it for you to pay for the very basics like food, housing, medical care, and heating? Not hard   Housing Stability   In the last 12 months, was there a time when you were not able to pay the mortgage or rent on time? N   In the past 12 months, how many times have you moved where you were living? 0   At any time in the past 12 months, were you homeless or living in a shelter (including now)? N   Transportation Needs   In the past 12 months, has lack of transportation kept you from medical appointments or from getting medications? no   In the past 12 months, has lack of transportation kept you from meetings, work, or from getting things needed for daily living? No   Intensity of Service   Intensity of Service 0-30 min     12/27/2024 0908am  Spoke with patient bedside in ED

## 2024-12-27 NOTE — Clinical Note
Luz Castillo was seen and treated in our emergency department on 12/27/2024.  She may return to work on 12/28/2024.       If you have any questions or concerns, please don't hesitate to call.      Christy Dsouza MD

## 2025-01-08 LAB
ATRIAL RATE: 56 BPM
P AXIS: -3 DEGREES
P OFFSET: 185 MS
P ONSET: 148 MS
PR INTERVAL: 144 MS
Q ONSET: 220 MS
QRS COUNT: 9 BEATS
QRS DURATION: 84 MS
QT INTERVAL: 400 MS
QTC CALCULATION(BAZETT): 386 MS
QTC FREDERICIA: 391 MS
R AXIS: 58 DEGREES
T AXIS: 31 DEGREES
T OFFSET: 420 MS
VENTRICULAR RATE: 56 BPM

## 2025-01-23 DIAGNOSIS — Z00.00 HEALTHCARE MAINTENANCE: ICD-10-CM

## 2025-01-23 DIAGNOSIS — K21.9 GASTROESOPHAGEAL REFLUX DISEASE WITHOUT ESOPHAGITIS: ICD-10-CM

## 2025-01-24 RX ORDER — PANTOPRAZOLE SODIUM 40 MG/1
40 TABLET, DELAYED RELEASE ORAL 2 TIMES DAILY
Qty: 90 TABLET | Refills: 1 | Status: SHIPPED | OUTPATIENT
Start: 2025-01-24

## 2025-01-24 RX ORDER — AMOXICILLIN 500 MG/1
CAPSULE ORAL
Qty: 4 CAPSULE | Refills: 0 | Status: SHIPPED | OUTPATIENT
Start: 2025-01-24

## 2025-03-11 ENCOUNTER — APPOINTMENT (OUTPATIENT)
Dept: OTOLARYNGOLOGY | Facility: CLINIC | Age: 70
End: 2025-03-11
Payer: MEDICARE

## 2025-03-11 VITALS — BODY MASS INDEX: 25.16 KG/M2 | WEIGHT: 142 LBS | HEIGHT: 63 IN

## 2025-03-11 DIAGNOSIS — K14.9 TONGUE DYSPLASIA: Primary | ICD-10-CM

## 2025-03-11 DIAGNOSIS — K13.21 LEUKOPLAKIA, TONGUE: ICD-10-CM

## 2025-03-11 DIAGNOSIS — F17.200 SMOKER: ICD-10-CM

## 2025-03-11 PROCEDURE — 1159F MED LIST DOCD IN RCRD: CPT | Performed by: OTOLARYNGOLOGY

## 2025-03-11 PROCEDURE — 99214 OFFICE O/P EST MOD 30 MIN: CPT | Performed by: OTOLARYNGOLOGY

## 2025-03-11 PROCEDURE — 3008F BODY MASS INDEX DOCD: CPT | Performed by: OTOLARYNGOLOGY

## 2025-03-11 NOTE — PROGRESS NOTES
Chief Complaint   Patient presents with    Follow-up     EP- 4 MO FOLLOW UP, DYSPHAGIA      HPI:  Luz Castillo is a 69 y.o. female who presents in follow-up today for continued evaluation and a 4-month check of chronic left lateral tongue lesion initially seen and biopsied in May 2022.   Also just recently biopsied July 10, 2024.  This showed severe dysplasia.  She is elected observation over surgical removal.  She is aware the risks of this approach.  She reports no change. No sore throat, hoarseness, dysphagia, neck pain, neck mass.  Continues to do well without any symptoms.  She is doing well without any pain. Only occasionally she will notice it when of some hard food touches it or pokes into it.    Having significant problems at home related to caring for her ill boyfriend and sister.  Bx 5/2022 showed some chronic inflammation and mild atypia. No obvious dysplasia or malignancy. Biopsy per report was adequate but superficial and malignancy cannot be entirely ruled out.        She is a smoker.  Down to half a pack.  October 2024 had hip replacement surgery.    7/10/24 Path:  FINAL DIAGNOSIS   Tongue, left ventrolateral, biopsy:  - Severe dysplasia with verrucous hyperkeratosis, see note.     Note: Severe dysplasia involves both tissue fragments.       5/2022 Path:  TONGUE, LEFT LATERAL, EXCISION:        CHRONICALLY INFLAMED SQUAMOUS MUCOSA SHOWING PROLIFERATIVE CHANGES             WITH MILD ATYPIA,      HYPERKERATOSIS AND PARAKERATOSIS, SEE   COMMENT.     Comment: The excised tissue is relatively superficial and the lesion is   partially transected at the deep aspect and the base of the lesion is   not entirely represented. A neoplastic process is not excluded. Given   the clinical findings of mass, the current biopsy may not be   representative of the pathologic process. Clinical correlation is   recommended.   PMH:  Past Medical History:   Diagnosis Date    Alcohol dependence, in remission 12/26/2019     History of alcoholism    Alcohol dependence, in remission 09/20/2021    History of alcoholism    Anxiety disorder, unspecified     Anxiety and depression    Chronic viral hepatitis C (Multi) 11/29/2021    Chronic viral hepatitis C    Hepatitis C     Osteopenia     Other long term (current) drug therapy 05/06/2021    High risk medication use    Personal history of diseases of the blood and blood-forming organs and certain disorders involving the immune mechanism     History of autoimmune disorder    Personal history of other diseases of the circulatory system     History of hypertension    Personal history of other diseases of the digestive system 09/20/2021    History of gastroesophageal reflux (GERD)    Personal history of other diseases of the female genital tract     History of endometriosis    Personal history of other endocrine, nutritional and metabolic disease 03/10/2021    History of goiter    Personal history of other infectious and parasitic diseases 03/17/2022    History of hepatitis C virus infection     Past Surgical History:   Procedure Laterality Date    MR HEAD ANGIO WO IV CONTRAST  1/12/2023    MR HEAD ANGIO WO IV CONTRAST 1/12/2023 [facility]    MR HEAD ANGIO WO IV CONTRAST  4/19/2018    MR HEAD ANGIO WO IV CONTRAST [facility]    MR NECK ANGIO WO IV CONTRAST  1/12/2023    MR NECK ANGIO WO IV CONTRAST 1/12/2023 [facility]    OTHER SURGICAL HISTORY  11/11/2021    Appendectomy    OTHER SURGICAL HISTORY  11/11/2021    Hernia repair    OTHER SURGICAL HISTORY  11/11/2021    Laparoscopy    OTHER SURGICAL HISTORY  11/11/2021    Shoulder replacement    OTHER SURGICAL HISTORY  11/11/2021    Shoulder arthroscopy    OTHER SURGICAL HISTORY  11/11/2021    Tubal ligation    OTHER SURGICAL HISTORY  11/11/2021    Colonic polypectomy    OTHER SURGICAL HISTORY  11/11/2021    Colonoscopy         Medications:     Current Outpatient Medications:     aspirin 81 mg EC tablet, Take 1 tablet  (81 mg) by mouth once daily., Disp: , Rfl:     atorvastatin (Lipitor) 20 mg tablet, Take 1 tablet (20 mg) by mouth once daily., Disp: 90 tablet, Rfl: 1    b complex vitamins capsule, Take 1 capsule by mouth once daily., Disp: , Rfl:     calcium carbonate 600 mg calcium (1,500 mg) tablet, Take 1,200 mg by mouth once daily., Disp: , Rfl:     cholecalciferol (Vitamin D-3) 25 MCG (1000 UT) capsule, Take 2 capsules (50 mcg) by mouth once daily., Disp: , Rfl:     folic acid (Folvite) 1 mg tablet, Take 1 tablet (1 mg) by mouth once daily., Disp: , Rfl:     ibuprofen 800 mg tablet, Take 1 tablet (800 mg) by mouth every 8 hours if needed for mild pain (1 - 3)., Disp: , Rfl:     levothyroxine (Synthroid, Levoxyl) 100 mcg tablet, take 1 tablet by mouth 6 days per week, Disp: 90 tablet, Rfl: 1    lisinopril 20 mg tablet, Take 1 tablet (20 mg) by mouth once daily., Disp: 90 tablet, Rfl: 1    magnesium glycinate 100 mg magnesium capsule, Take 1 capsule (100 mg) by mouth once daily., Disp: , Rfl:     multivit-min/ferrous gluconate (CENTAMIN ORAL), Take 1 capsule by mouth early in the morning.., Disp: , Rfl:     olopatadine (Pataday) 0.2 % ophthalmic solution, Administer 1 drop into both eyes once daily., Disp: , Rfl:     omega 3-dha-epa-fish oil 360 mg-108 mg- 180 mg-1,200 mg capsule, Take 1 capsule by mouth once daily., Disp: , Rfl:     ondansetron (Zofran) 4 mg tablet, TAKE ONE (1) TABLET BY MOUTH EVERY 8 HOURS IF NEEDED FOR NAUSEA OR VOMITING, Disp: 40 tablet, Rfl: 0    pantoprazole (ProtoNix) 40 mg EC tablet, Take 1 tablet (40 mg) by mouth 2 times a day. Take 1 tab once in the morning and once at night. Do not crush, chew, or split., Disp: 90 tablet, Rfl: 1    TURMERIC ORAL, Take 500 mg by mouth once daily., Disp: , Rfl:     varenicline (Chantix) 0.5 mg tablet, Take 1 tablet (0.5 mg) by mouth see administration instructions. Take one tablet by mouth once daily for days 1-3 then take one tablet by mouth twice daily for days  "4-7, Disp: 11 tablet, Rfl: 0    varenicline (Chantix) 1 mg tablet, Take 1 tablet (1 mg) by mouth 2 times a day., Disp: 90 tablet, Rfl: 1    acetaminophen (Tylenol) 500 mg tablet, Take 1 tablet by mouth every 6 hours. (Patient not taking: Reported on 3/11/2025), Disp: , Rfl:     amoxicillin (Amoxil) 500 mg capsule, Take 4 tablets one time for dental procedure (Patient not taking: Reported on 3/11/2025), Disp: 4 capsule, Rfl: 0    HYDROcodone-acetaminophen (Norco) 5-325 mg tablet, TAKE TWO TABLETS BY MOUTH EVERY 8 HOURS. AS NEEDED FOR PAIN (Patient not taking: Reported on 3/11/2025), Disp: , Rfl:      Allergies:  Allergies   Allergen Reactions    Celecoxib Unknown     Comments: heart races    Diphenhydramine Unknown    Iodine Itching and Swelling    Lipitor [Atorvastatin] Hives and Unknown     Pt. Is allergic to specifically Lipitor    Pramipexole Unknown    Rofecoxib Other     tacjycardia        ROS:  Review of systems normal unless stated otherwise in the HPI and/or PMH.    Physical Exam:  Height 1.6 m (5' 3\"), weight 64.4 kg (142 lb). Body mass index is 25.15 kg/m².     GENERAL APPEARANCE: Well developed and well nourished.  Alert and oriented in no acute distress.  Normal vocal quality.      HEAD/FACE: No erythema or edema or facial tenderness.  Normal facial nerve function bilaterally.    EAR:       EXTERNAL: Normal pinnas and external auditory canals without lesion or obstructing wax.       MIDDLE EAR: Tympanic membranes intact and mobile with normal landmarks.  Middle ear space appears well aerated.       TUBE STATUS: N/A       MASTOID CAVITY: N/A       HEARING: Gross hearing assessment is within normal limits.      NOSE:       VISUALIZED USING: Anterior rhinoscopy with headlight and nasal speculum.       DORSUM: Midline, nontraumatic appearance.       MUCOSA: Normal-appearing.       SECRETIONS: Normal.       SEPTUM: Midline and nonobstructing.       INFERIOR TURBINATES: Normal.       MIDDLE TURBINATES/MEATUS: " N/A       BLEEDING: N/A         ORAL CAVITY/PHARYNX:       TEETH: Adequate dentition.       TONGUE: No change in the approximate 2 x 4 cm left lateral tongue plaque-like area of leukoplakia.  Without any palpable depth.  No ulceration.  No tenderness.  Normal mobility.  No change.        FLOOR OF MOUTH: No mass or lesion.       PALATE: Normal hard palate, soft palate, and uvula.       OROPHARYNX: Normal without mass or lesion.       BUCCAL MUCOSA/GBS: Normal without mass or lesion.       LIPS: Normal.    LARYNX/HYPOPHARYNX/NASOPHARYNX: N/A    NECK: No palpable masses or abnormal adenopathy.  Trachea is midline.    THYROID: No thyromegaly or palpable nodule.    SALIVARY GLANDS: Normal bilateral parotid and submandibular glands by inspection and palpation.    TMJ's: Normal.    NEURO: Cranial nerve exam grossly normal bilaterally.       Assessment/Plan   Luz was seen today for follow-up.  Diagnoses and all orders for this visit:  Tongue dysplasia (Primary)  Leukoplakia, tongue  Smoker    Provide continue education support about the diagnosis of the dysplasia and that this is a precancerous lesion.  This certainly can develop into malignancy in the future and cause some significant problems.  We did go over options of watchful waiting with close clinical observation versus surgical removal versus second opinion from [place] [facility] [place] [facility]  At this point knowing the pros and cons of each approach including the risk of delayed diagnosis and morbidity and mortality associated with that, she does wish to continue active observation at this time and does not wish any invasive procedure or consultation.    I will see her again in 4 months or sooner with any changes such as but not limited to pain, ulceration, neck mass.    Doing a great job quitting smoking.  Follow up in about 4 months (around 7/11/2025) for Recheck, sooner with changes or worsening symptoms.     Toy Gallardo MD

## 2025-03-20 ENCOUNTER — APPOINTMENT (OUTPATIENT)
Dept: PRIMARY CARE | Facility: CLINIC | Age: 70
End: 2025-03-20
Payer: MEDICARE

## 2025-03-26 ENCOUNTER — APPOINTMENT (OUTPATIENT)
Dept: PRIMARY CARE | Facility: CLINIC | Age: 70
End: 2025-03-26
Payer: MEDICARE

## 2025-03-28 ENCOUNTER — TELEPHONE (OUTPATIENT)
Dept: PRIMARY CARE | Facility: CLINIC | Age: 70
End: 2025-03-28
Payer: MEDICARE

## 2025-03-28 NOTE — TELEPHONE ENCOUNTER
Pt is asking if [facility] can send in enough Chantix to the [facility] to cover when she sees her new pcp on 04/01.  I advised pt I can only send the message to see if [facility] will send the script in, since VIKY has never seen pt. Please advise pt. [phone]

## 2025-03-28 NOTE — TELEPHONE ENCOUNTER
Pt was informed to call Dr. Higuera's office due to Teresa never seeing her, she said that he will not refill because she is not his pt. I said maybe call her pharmacy to get 3 day hold over.

## 2025-04-01 ENCOUNTER — APPOINTMENT (OUTPATIENT)
Dept: PRIMARY CARE | Facility: CLINIC | Age: 70
End: 2025-04-01
Payer: MEDICARE

## 2025-04-01 VITALS
SYSTOLIC BLOOD PRESSURE: 116 MMHG | WEIGHT: 144 LBS | HEIGHT: 63 IN | BODY MASS INDEX: 25.52 KG/M2 | HEART RATE: 66 BPM | DIASTOLIC BLOOD PRESSURE: 64 MMHG

## 2025-04-01 DIAGNOSIS — F17.210 CIGARETTE NICOTINE DEPENDENCE WITHOUT COMPLICATION: ICD-10-CM

## 2025-04-01 DIAGNOSIS — E78.5 HYPERLIPIDEMIA, UNSPECIFIED HYPERLIPIDEMIA TYPE: ICD-10-CM

## 2025-04-01 DIAGNOSIS — K21.9 GASTROESOPHAGEAL REFLUX DISEASE WITHOUT ESOPHAGITIS: ICD-10-CM

## 2025-04-01 DIAGNOSIS — Z00.00 ENCOUNTER FOR ANNUAL HEALTH EXAMINATION: ICD-10-CM

## 2025-04-01 DIAGNOSIS — F17.200 SMOKER: ICD-10-CM

## 2025-04-01 DIAGNOSIS — Z91.89 STREPTOCOCCUS PNEUMONIAE VACCINATION INDICATED: ICD-10-CM

## 2025-04-01 DIAGNOSIS — I10 PRIMARY HYPERTENSION: ICD-10-CM

## 2025-04-01 DIAGNOSIS — Z12.31 BREAST CANCER SCREENING BY MAMMOGRAM: ICD-10-CM

## 2025-04-01 DIAGNOSIS — Z00.00 ROUTINE GENERAL MEDICAL EXAMINATION AT HEALTH CARE FACILITY: Primary | ICD-10-CM

## 2025-04-01 DIAGNOSIS — E03.9 HYPOTHYROIDISM, UNSPECIFIED TYPE: ICD-10-CM

## 2025-04-01 DIAGNOSIS — R53.83 OTHER FATIGUE: ICD-10-CM

## 2025-04-01 DIAGNOSIS — F17.210 SMOKES 1/2 PACK PER DAY: ICD-10-CM

## 2025-04-01 PROCEDURE — 1159F MED LIST DOCD IN RCRD: CPT

## 2025-04-01 PROCEDURE — G0439 PPPS, SUBSEQ VISIT: HCPCS

## 2025-04-01 PROCEDURE — 3074F SYST BP LT 130 MM HG: CPT

## 2025-04-01 PROCEDURE — 3078F DIAST BP <80 MM HG: CPT

## 2025-04-01 PROCEDURE — 3008F BODY MASS INDEX DOCD: CPT

## 2025-04-01 PROCEDURE — 1170F FXNL STATUS ASSESSED: CPT

## 2025-04-01 RX ORDER — IBUPROFEN 200 MG
1 TABLET ORAL EVERY 24 HOURS
Qty: 56 PATCH | Refills: 0 | Status: SHIPPED | OUTPATIENT
Start: 2025-04-01 | End: 2025-05-27

## 2025-04-01 RX ORDER — VARENICLINE TARTRATE 1 MG/1
1 TABLET, FILM COATED ORAL 2 TIMES DAILY
Qty: 90 TABLET | Refills: 1 | Status: SHIPPED | OUTPATIENT
Start: 2025-04-01

## 2025-04-01 RX ORDER — PANTOPRAZOLE SODIUM 40 MG/1
40 TABLET, DELAYED RELEASE ORAL 2 TIMES DAILY
Qty: 90 TABLET | Refills: 1 | Status: SHIPPED | OUTPATIENT
Start: 2025-04-01

## 2025-04-01 RX ORDER — LISINOPRIL 20 MG/1
20 TABLET ORAL DAILY
Qty: 90 TABLET | Refills: 1 | Status: SHIPPED | OUTPATIENT
Start: 2025-04-01

## 2025-04-01 ASSESSMENT — ENCOUNTER SYMPTOMS
OCCASIONAL FEELINGS OF UNSTEADINESS: 0
DEPRESSION: 0
LOSS OF SENSATION IN FEET: 0

## 2025-04-01 ASSESSMENT — ACTIVITIES OF DAILY LIVING (ADL)
GROCERY_SHOPPING: INDEPENDENT
DOING_HOUSEWORK: INDEPENDENT
MANAGING_FINANCES: INDEPENDENT
DRESSING: INDEPENDENT
TAKING_MEDICATION: INDEPENDENT
BATHING: INDEPENDENT

## 2025-04-01 NOTE — PROGRESS NOTES
"Chief Complaint  Patient ID: Luz Castillo is a 69 y.o. female who presents for annual physical.         Reviewed all medications by prescribing practitioner or clinical pharmacist (such as prescriptions, OTCs, herbal therapies and supplements) and documented in the medical record.    History of Present Illness  1.  Physical exam.  Pap: Discontinued  Mammogram: last done 5/6/2024  Colonoscopy: last done 10/2022  Immunizations: Due for Zoster, Prevnar 20 and RSV    2. Hyperlipidemia  - Lipid panel from 2024 WNL  - Atorvastatin maintaining lipid values at normal level    3. Hypertension  - BP today 116/64  - lisinopril 20 mg     4. Osteopenia  - takes calcium, D3 and folic acid    5. Hypothyroidism  -  TSH 9/19/24 was WNL 1.95    6. Smoker  - 53 pack year history  - down to half a pack a day. She has been trying to quit since 9/24/24. This is when she started taking chantix.  - Chantix 1mg BID was prescribed but pt has been taking less as she has been runnning out  - since decreasing her dose she feels unsteady and fatigue. She describes her unsteadiness as \"collywobbles\" but does not state she feels like the room is spinning and has no concerns of falling  - Reports not taking her zofran with chantix which may explain the above.  - Lost 8 lbs over 8 months due to stomach uneasiness    7. Night Sweats  -chronic, but has been creeping into the day time    8. Acid Reflux  - controlled by PPI    Review of Systems  All pertinent positive symptoms are included in the history of present illness.    All other systems have been reviewed and are negative and noncontributory to this patient's current ailments.    Past Medical History  She has a past medical history of Alcohol dependence, in remission (12/26/2019), Alcohol dependence, in remission (09/20/2021), Anxiety disorder, unspecified, Chronic viral hepatitis C (Multi) (11/29/2021), Hepatitis C, Osteopenia, Other long term (current) drug therapy (05/06/2021), Personal " history of diseases of the blood and blood-forming organs and certain disorders involving the immune mechanism, Personal history of other diseases of the circulatory system, Personal history of other diseases of the digestive system (09/20/2021), Personal history of other diseases of the female genital tract, Personal history of other endocrine, nutritional and metabolic disease (03/10/2021), and Personal history of other infectious and parasitic diseases (03/17/2022).    Surgical History  She has a past surgical history that includes Other surgical history (11/11/2021); Other surgical history (11/11/2021); Other surgical history (11/11/2021); Other surgical history (11/11/2021); Other surgical history (11/11/2021); Other surgical history (11/11/2021); Other surgical history (11/11/2021); Other surgical history (11/11/2021); MR angio head wo IV contrast (1/12/2023); MR angio neck wo IV contrast (1/12/2023); and MR angio head wo IV contrast (4/19/2018).     Social History  She reports that she has been smoking cigarettes. She has never used smokeless tobacco. She reports that she does not drink alcohol and does not use drugs.    Family History   Problem Relation Name Age of Onset    Heart disease Father      Diabetes Father       Outpatient Medications Prior to Visit   Medication Sig Dispense Refill    aspirin 81 mg EC tablet Take 1 tablet (81 mg) by mouth once daily.      atorvastatin (Lipitor) 20 mg tablet Take 1 tablet (20 mg) by mouth once daily. (Patient taking differently: Take 0.5 tablets (10 mg) by mouth once daily.) 90 tablet 1    b complex vitamins capsule Take 1 capsule by mouth once daily.      calcium carbonate 600 mg calcium (1,500 mg) tablet Take 1,200 mg by mouth once daily.      cholecalciferol (Vitamin D-3) 25 MCG (1000 UT) capsule Take 2 capsules (50 mcg) by mouth once daily.      folic acid (Folvite) 1 mg tablet Take 1 tablet (1 mg) by mouth once daily.      ibuprofen 800 mg tablet Take 1 tablet  (800 mg) by mouth every 8 hours if needed for mild pain (1 - 3).      levothyroxine (Synthroid, Levoxyl) 100 mcg tablet take 1 tablet by mouth 6 days per week 90 tablet 1    magnesium glycinate 100 mg magnesium capsule Take 1 capsule (100 mg) by mouth once daily.      multivit-min/ferrous gluconate (CENTAMIN ORAL) Take 1 capsule by mouth early in the morning..      olopatadine (Pataday) 0.2 % ophthalmic solution Administer 1 drop into both eyes once daily.      omega 3-dha-epa-fish oil 360 mg-108 mg- 180 mg-1,200 mg capsule Take 1 capsule by mouth once daily.      ondansetron (Zofran) 4 mg tablet TAKE ONE (1) TABLET BY MOUTH EVERY 8 HOURS IF NEEDED FOR NAUSEA OR VOMITING 40 tablet 0    TURMERIC ORAL Take 500 mg by mouth once daily.      varenicline (Chantix) 0.5 mg tablet Take 1 tablet (0.5 mg) by mouth see administration instructions. Take one tablet by mouth once daily for days 1-3 then take one tablet by mouth twice daily for days 4-7 11 tablet 0    varenicline (Chantix) 1 mg tablet Take 1 tablet (1 mg) by mouth 2 times a day. 90 tablet 1    lisinopril 20 mg tablet Take 1 tablet (20 mg) by mouth once daily. 90 tablet 1    pantoprazole (ProtoNix) 40 mg EC tablet Take 1 tablet (40 mg) by mouth 2 times a day. Take 1 tab once in the morning and once at night. Do not crush, chew, or split. 90 tablet 1    acetaminophen (Tylenol) 500 mg tablet Take 1 tablet by mouth every 6 hours. (Patient not taking: Reported on 11/12/2024)      amoxicillin (Amoxil) 500 mg capsule Take 4 tablets one time for dental procedure (Patient not taking: Reported on 4/1/2025) 4 capsule 0    HYDROcodone-acetaminophen (Norco) 5-325 mg tablet TAKE TWO TABLETS BY MOUTH EVERY 8 HOURS. AS NEEDED FOR PAIN (Patient not taking: Reported on 4/1/2025)       No facility-administered medications prior to visit.     Allergies  Celecoxib, Diphenhydramine, Iodine, Lipitor [atorvastatin], Pramipexole, and Rofecoxib    Immunization History   Administered Date(s)  "Administered    Hep A / Hep B 03/31/2010, 04/28/2010, 09/29/2010    Hepatitis B vaccine, adult *Check Product/Dose* 10/27/2022    Moderna COVID-19 vaccine, bivalent, blue cap/gray label *Check age/dose* 01/04/2023    Pneumococcal polysaccharide vaccine, 23-valent, age 2 years and older (PNEUMOVAX 23) 09/20/2021    Tdap vaccine, age 7 year and older (BOOSTRIX, ADACEL) 07/08/2012     Objective   Visit Vitals  /64   Pulse 66   Ht 1.6 m (5' 3\")   Wt 65.3 kg (144 lb)   BMI 25.51 kg/m²   OB Status Postmenopausal   Smoking Status Every Day   BSA 1.7 m²        BP Readings from Last 3 Encounters:   04/01/25 116/64   12/27/24 121/61   11/01/24 118/68      Wt Readings from Last 3 Encounters:   04/01/25 65.3 kg (144 lb)   03/11/25 64.4 kg (142 lb)   12/27/24 64.9 kg (143 lb)      Vision  No results found.    Relevant Results  No visits with results within 1 Month(s) from this visit.   Latest known visit with results is:   Admission on 12/27/2024, Discharged on 12/27/2024   Component Date Value    WBC 12/27/2024 4.4     nRBC 12/27/2024 0.0     RBC 12/27/2024 4.10     Hemoglobin 12/27/2024 13.2     Hematocrit 12/27/2024 38.5     MCV 12/27/2024 94     MCH 12/27/2024 32.2     MCHC 12/27/2024 34.3     RDW 12/27/2024 12.5     Platelets 12/27/2024 233     Neutrophils % 12/27/2024 60.4     Immature Granulocytes %,* 12/27/2024 0.5     Lymphocytes % 12/27/2024 25.3     Monocytes % 12/27/2024 8.7     Eosinophils % 12/27/2024 4.4     Basophils % 12/27/2024 0.7     Neutrophils Absolute 12/27/2024 2.63     Immature Granulocytes Ab* 12/27/2024 0.02     Lymphocytes Absolute 12/27/2024 1.10 (L)     Monocytes Absolute 12/27/2024 0.38     Eosinophils Absolute 12/27/2024 0.19     Basophils Absolute 12/27/2024 0.03     Glucose 12/27/2024 106 (H)     Sodium 12/27/2024 141     Potassium 12/27/2024 4.1     Chloride 12/27/2024 105     Bicarbonate 12/27/2024 21     Anion Gap 12/27/2024 19     Urea Nitrogen 12/27/2024 11     Creatinine 12/27/2024 " 0.78     eGFR 12/27/2024 82     Calcium 12/27/2024 8.8     Albumin 12/27/2024 4.0     Alkaline Phosphatase 12/27/2024 43     Total Protein 12/27/2024 6.5     AST 12/27/2024 20     Bilirubin, Total 12/27/2024 0.3     ALT 12/27/2024 23     Troponin I, High Sensiti* 12/27/2024 3     Protime 12/27/2024 11.0     INR 12/27/2024 1.0     aPTT 12/27/2024 30     POCT Glucose 12/27/2024 102 (A)     Ventricular Rate 12/27/2024 56     Atrial Rate 12/27/2024 56     CT Interval 12/27/2024 144     QRS Duration 12/27/2024 84     QT Interval 12/27/2024 400     QTC Calculation(Bazett) 12/27/2024 386     P Axis 12/27/2024 -3     R Axis 12/27/2024 58     T [place] 12/27/2024 31     QRS Count 12/27/2024 9     Q Onset 12/27/2024 220     P Onset 12/27/2024 148     P Offset 12/27/2024 185     T Offset 12/27/2024 420     QTC Fredericia 12/27/2024 391     Flu A Result 12/27/2024 Not Detected     Flu B Result 12/27/2024 Not Detected     Coronavirus 2019, PCR 12/27/2024 Not Detected     POCT Glucose 12/27/2024 102 (H)      The 10-year ASCVD risk score (Leena LANDIN, et al., 2019) is: 13.8%    Values used to calculate the score:      Age: 69 years      Sex: Female      Is Non- : No      Diabetic: No      Tobacco smoker: Yes      Systolic Blood Pressure: 116 mmHg      Is BP treated: Yes      HDL Cholesterol: 63.5 mg/dL      Total Cholesterol: 159 mg/dL    Physical Exam  CONSTITUTIONAL - well nourished, well developed, looks like stated age, in no acute distress, not ill-appearing, and not tired appearing  SKIN - normal skin color and pigmentation, normal skin turgor without rash, lesions, or nodules visualized  HEAD - no trauma, normocephalic  EYES - pupils are equal and reactive to light, extraocular muscles are intact, and normal external exam  ENT - TM's intact, no injection, no signs of infection, uvula midline, normal tongue movement and throat normal, no exudate, nasal passage without discharge and patent  NECK - supple  without rigidity, no neck mass was observed, no thyromegaly or thyroid nodules  CHEST - clear to auscultation, no wheezing, no crackles and no rales, good effort  CARDIAC - regular rate and regular rhythm, no skipped beats, no murmur  ABDOMEN - no organomegaly, soft, nontender, nondistended, normal bowel sounds, no guarding/rebound/rigidity, negative McBurney sign and negative Reveles sign  RECTAL - prostate is smooth, not enlarged, nontender, no masses or nodules palpable; normal sphincter tone, no rectal masses  EXTREMITIES - no obvious or evident edema, no obvious or evident deformities  NEUROLOGICAL - normal gait, normal balance, normal motor, no ataxia, DTRs equal and symmetrical; alert, oriented and no focal signs  PSYCHIATRIC - alert, pleasant and cordial, age-appropriate  IMMUNOLOGIC - no cervical lymphadenopathy    Assessment and Plan  Problem List Items Addressed This Visit          Cardiac and Vasculature    Hyperlipidemia    Relevant Orders    Lipid Panel    Hypertension    Relevant Medications    lisinopril 20 mg tablet       Endocrine/Metabolic    Hypothyroidism    Relevant Orders    TSH with reflex to Free T4 if abnormal       Gastrointestinal and Abdominal    Gastroesophageal reflux disease    Relevant Medications    pantoprazole (ProtoNix) 40 mg EC tablet     Other Visit Diagnoses       Routine general medical examination at health care facility    -  Primary    Relevant Orders    1 Year Follow Up In Primary Care - Wellness Exam    Encounter for annual health examination        Appopriate labs and imaging ordered. Appropriate medications refilled    Relevant Orders    CBC and Auto Differential    Comprehensive Metabolic Panel    Hemoglobin A1C    BI mammo bilateral screening tomosynthesis    Smoker        53 year pack history. Low dose CT chest ordered for screening    Relevant Orders    CT lung screening low dose    Other fatigue        Relevant Orders    CBC and Auto Differential    Smokes 1/2 pack  per day        Relevant Orders    CT lung screening low dose    Breast cancer screening by mammogram        Relevant Orders    BI mammo bilateral screening tomosynthesis    Streptococcus pneumoniae vaccination indicated        Prevnar 20 Administered        Health maintenance  Overall patient is doing well.    Complete history and physical examination was performed   Lab work was ordered and will notify of test results and make recommendations accordingly  Please attempt to eat a well-balanced diet and exercise regularly    Patient discussed with Dr. Chacho Nunez DO, PGY 1  [facility]

## 2025-04-04 LAB
ALBUMIN SERPL-MCNC: 4.5 G/DL (ref 3.6–5.1)
ALP SERPL-CCNC: 43 U/L (ref 37–153)
ALT SERPL-CCNC: 24 U/L (ref 6–29)
ANION GAP SERPL CALCULATED.4IONS-SCNC: 7 MMOL/L (CALC) (ref 7–17)
AST SERPL-CCNC: 23 U/L (ref 10–35)
BASOPHILS # BLD AUTO: 40 CELLS/UL (ref 0–200)
BASOPHILS NFR BLD AUTO: 1 %
BILIRUB SERPL-MCNC: 0.4 MG/DL (ref 0.2–1.2)
BUN SERPL-MCNC: 13 MG/DL (ref 7–25)
CALCIUM SERPL-MCNC: 9.7 MG/DL (ref 8.6–10.4)
CHLORIDE SERPL-SCNC: 105 MMOL/L (ref 98–110)
CHOLEST SERPL-MCNC: 191 MG/DL
CHOLEST/HDLC SERPL: 2.4 (CALC)
CO2 SERPL-SCNC: 28 MMOL/L (ref 20–32)
CREAT SERPL-MCNC: 0.88 MG/DL (ref 0.5–1.05)
EGFRCR SERPLBLD CKD-EPI 2021: 71 ML/MIN/1.73M2
EOSINOPHIL # BLD AUTO: 220 CELLS/UL (ref 15–500)
EOSINOPHIL NFR BLD AUTO: 5.5 %
ERYTHROCYTE [DISTWIDTH] IN BLOOD BY AUTOMATED COUNT: 12 % (ref 11–15)
EST. AVERAGE GLUCOSE BLD GHB EST-MCNC: 117 MG/DL
EST. AVERAGE GLUCOSE BLD GHB EST-SCNC: 6.5 MMOL/L
GLUCOSE SERPL-MCNC: 96 MG/DL (ref 65–99)
HBA1C MFR BLD: 5.7 % OF TOTAL HGB
HCT VFR BLD AUTO: 42.9 % (ref 35–45)
HDLC SERPL-MCNC: 79 MG/DL
HGB BLD-MCNC: 14.2 G/DL (ref 11.7–15.5)
LDLC SERPL CALC-MCNC: 94 MG/DL (CALC)
LYMPHOCYTES # BLD AUTO: 1588 CELLS/UL (ref 850–3900)
LYMPHOCYTES NFR BLD AUTO: 39.7 %
MCH RBC QN AUTO: 32.2 PG (ref 27–33)
MCHC RBC AUTO-ENTMCNC: 33.1 G/DL (ref 32–36)
MCV RBC AUTO: 97.3 FL (ref 80–100)
MONOCYTES # BLD AUTO: 392 CELLS/UL (ref 200–950)
MONOCYTES NFR BLD AUTO: 9.8 %
NEUTROPHILS # BLD AUTO: 1760 CELLS/UL (ref 1500–7800)
NEUTROPHILS NFR BLD AUTO: 44 %
NONHDLC SERPL-MCNC: 112 MG/DL (CALC)
PLATELET # BLD AUTO: 241 THOUSAND/UL (ref 140–400)
PMV BLD REES-ECKER: 10.5 FL (ref 7.5–12.5)
POTASSIUM SERPL-SCNC: 4.7 MMOL/L (ref 3.5–5.3)
PROT SERPL-MCNC: 7 G/DL (ref 6.1–8.1)
RBC # BLD AUTO: 4.41 MILLION/UL (ref 3.8–5.1)
SODIUM SERPL-SCNC: 140 MMOL/L (ref 135–146)
TRIGL SERPL-MCNC: 86 MG/DL
TSH SERPL-ACNC: 2.31 MIU/L (ref 0.4–4.5)
WBC # BLD AUTO: 4 THOUSAND/UL (ref 3.8–10.8)

## 2025-04-08 ENCOUNTER — TELEPHONE (OUTPATIENT)
Dept: RADIOLOGY | Facility: HOSPITAL | Age: 70
End: 2025-04-08
Payer: MEDICARE

## 2025-04-08 NOTE — TELEPHONE ENCOUNTER
Call placed to the patient to schedule her for her annual LDCT of the chest. Patient scheduled for her LDCT and her Mammogram.

## 2025-04-14 ENCOUNTER — OFFICE VISIT (OUTPATIENT)
Dept: OPHTHALMOLOGY | Facility: CLINIC | Age: 70
End: 2025-04-14
Payer: MEDICARE

## 2025-04-14 DIAGNOSIS — H35.071: Primary | ICD-10-CM

## 2025-04-14 DIAGNOSIS — H35.051: Primary | ICD-10-CM

## 2025-04-14 DIAGNOSIS — H52.7 REFRACTION ERROR: ICD-10-CM

## 2025-04-14 DIAGNOSIS — Z96.1 PSEUDOPHAKIA OF BOTH EYES: ICD-10-CM

## 2025-04-14 DIAGNOSIS — H40.153 RESIDUAL STAGE OF OPEN-ANGLE GLAUCOMA OF BOTH EYES: ICD-10-CM

## 2025-04-14 PROCEDURE — 92015 DETERMINE REFRACTIVE STATE: CPT | Performed by: OPHTHALMOLOGY

## 2025-04-14 PROCEDURE — 99204 OFFICE O/P NEW MOD 45 MIN: CPT | Performed by: OPHTHALMOLOGY

## 2025-04-14 PROCEDURE — 99214 OFFICE O/P EST MOD 30 MIN: CPT | Performed by: OPHTHALMOLOGY

## 2025-04-14 PROCEDURE — 92133 CPTRZD OPH DX IMG PST SGM ON: CPT | Performed by: OPHTHALMOLOGY

## 2025-04-14 ASSESSMENT — ENCOUNTER SYMPTOMS
ALLERGIC/IMMUNOLOGIC NEGATIVE: 0
MUSCULOSKELETAL NEGATIVE: 0
PSYCHIATRIC NEGATIVE: 0
CARDIOVASCULAR NEGATIVE: 0
NEUROLOGICAL NEGATIVE: 0
HEMATOLOGIC/LYMPHATIC NEGATIVE: 0
ENDOCRINE NEGATIVE: 0
GASTROINTESTINAL NEGATIVE: 0
EYES NEGATIVE: 0
CONSTITUTIONAL NEGATIVE: 0
RESPIRATORY NEGATIVE: 0

## 2025-04-14 ASSESSMENT — REFRACTION_WEARINGRX
OS_CYLINDER: -0.75
OD_AXIS: 092
OS_AXIS: 090
OD_CYLINDER: -1.75
OD_ADD: +2.75
OD_SPHERE: +1.25
SPECS_TYPE: PAL
OS_SPHERE: +0.25
OS_ADD: +2.75

## 2025-04-14 ASSESSMENT — REFRACTION_MANIFEST
OS_SPHERE: +1.75
OS_AXIS: 095
OD_ADD: +3.00
OD_CYLINDER: -2.00
OS_ADD: +3.00
METHOD_AUTOREFRACTION: 1
OD_SPHERE: +1.50
OS_AXIS: 095
OS_SPHERE: +0.75
OD_SPHERE: +1.25
OD_AXIS: 070
OD_CYLINDER: -1.75
OD_AXIS: 090
OS_CYLINDER: -1.25
OS_CYLINDER: -1.75

## 2025-04-14 ASSESSMENT — KERATOMETRY
OD_K2POWER_DIOPTERS: 46.25
OS_K2POWER_DIOPTERS: 46.25
OS_AXISANGLE2_DEGREES: 100
OD_AXISANGLE_DEGREES: 155
OD_K1POWER_DIOPTERS: 45.00
OD_AXISANGLE2_DEGREES: 65
OS_K1POWER_DIOPTERS: 45.50
OS_AXISANGLE_DEGREES: 10

## 2025-04-14 ASSESSMENT — EXTERNAL EXAM - RIGHT EYE: OD_EXAM: NORMAL

## 2025-04-14 ASSESSMENT — CUP TO DISC RATIO
OD_RATIO: 0.4
OS_RATIO: 0.4

## 2025-04-14 ASSESSMENT — VISUAL ACUITY
OS_CC: 20/25
METHOD: SNELLEN - LINEAR
OD_CC: 20/30
CORRECTION_TYPE: GLASSES
OD_CC+: -2

## 2025-04-14 ASSESSMENT — PAIN SCALES - GENERAL: PAINLEVEL_OUTOF10: 0-NO PAIN

## 2025-04-14 ASSESSMENT — EXTERNAL EXAM - LEFT EYE: OS_EXAM: NORMAL

## 2025-04-14 ASSESSMENT — SLIT LAMP EXAM - LIDS
COMMENTS: NORMAL
COMMENTS: NORMAL

## 2025-04-14 ASSESSMENT — TONOMETRY
OD_IOP_MMHG: 21
OS_IOP_MMHG: 22
IOP_METHOD: GOLDMANN APPLANATION

## 2025-04-14 NOTE — ASSESSMENT & PLAN NOTE
IOP borderline today and patent appearing PI both eyes (OU), is also post cataract extraction (CE). Will monitor IOP moving forward and baseline OCT nerve done. No obvious glaucomatous change both eyes (OU).

## 2025-04-14 NOTE — ASSESSMENT & PLAN NOTE
New Rx for glasses/SCL given per patient request. Patient's signature obtained to acknowledge and confirm that a paper copy of glasses/SCL Rx was given to patient in compliance with [facility] Eyeglass Rule. Electronic copy of Rx will also be available via [employer]/EPIC.

## 2025-04-14 NOTE — PROGRESS NOTES
Assessment/Plan   Problem List Items Addressed This Visit       Residual stage of open-angle glaucoma of both eyes     IOP borderline today and patent appearing PI both eyes (OU), is also post cataract extraction (CE). Will monitor IOP moving forward and baseline OCT nerve done. No obvious glaucomatous change both eyes (OU).          Relevant Orders    OCT, Optic Nerve - OU - Both Eyes (Completed)    Pseudophakia of both eyes     Stable appearing intraocular lens (IOL) both eyes (OU), will monitor with serial exam.            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 Following closely with outside retina.          Refraction error     New Rx for glasses/SCL given per patient request. Patient's signature obtained to acknowledge and confirm that a paper copy of glasses/SCL Rx was given to patient in compliance with [facility] Eyeglass Rule. Electronic copy of Rx will also be available via [employer].               Provided reassurance regarding above diagnoses and care received in the office visit today. Discussed outcomes and options along with the importance of treatment compliance. Understands the importance of any follow up visits. Patient instructed to call/communicate with our office if any new issues, questions, or concerns.     Will plan to see back in 1 year full OCT nerve or sooner PRN

## 2025-04-14 NOTE — ASSESSMENT & PLAN NOTE
Mac tel both eyes (OU) but has required injections right eye (OD) for choroidal neovascular membrane (CNVM). Following closely with outside retina.

## 2025-04-14 NOTE — PATIENT INSTRUCTIONS
Thank you so much for choosing me to provide your care today!    If you were dilated your vision may remain blurry   or light sensitive for several hours.    The nature of eye and vision problems can require frequent follow up, please make every effort to adhere to any future appointments.    If you have any issues, questions, or concerns,   please do not hesitate to reach out.    If you receive a survey in regards to your care today, please mention any exceptional care my office staff and/or technicians provided.    You can reach our office at this number:    [phone]    Please consider signing up for and utilizing [employer]!  This is the best way to directly reach me or other  providers    
03-Jan-2024

## 2025-04-18 ENCOUNTER — APPOINTMENT (OUTPATIENT)
Dept: PRIMARY CARE | Facility: CLINIC | Age: 70
End: 2025-04-18
Payer: MEDICARE

## 2025-04-18 DIAGNOSIS — E78.5 HYPERLIPIDEMIA, UNSPECIFIED HYPERLIPIDEMIA TYPE: ICD-10-CM

## 2025-04-18 PROBLEM — R73.03 PREDIABETES: Status: ACTIVE | Noted: 2025-04-18

## 2025-05-08 ENCOUNTER — HOSPITAL ENCOUNTER (OUTPATIENT)
Dept: RADIOLOGY | Facility: HOSPITAL | Age: 70
Discharge: HOME | End: 2025-05-08
Payer: MEDICARE

## 2025-05-08 VITALS — WEIGHT: 144 LBS | BODY MASS INDEX: 25.52 KG/M2 | HEIGHT: 63 IN

## 2025-05-08 DIAGNOSIS — F17.200 SMOKER: ICD-10-CM

## 2025-05-08 DIAGNOSIS — Z12.31 BREAST CANCER SCREENING BY MAMMOGRAM: ICD-10-CM

## 2025-05-08 DIAGNOSIS — F17.210 SMOKES 1/2 PACK PER DAY: ICD-10-CM

## 2025-05-08 DIAGNOSIS — Z00.00 ENCOUNTER FOR ANNUAL HEALTH EXAMINATION: ICD-10-CM

## 2025-05-08 PROCEDURE — 77067 SCR MAMMO BI INCL CAD: CPT

## 2025-05-08 PROCEDURE — 77063 BREAST TOMOSYNTHESIS BI: CPT | Performed by: STUDENT IN AN ORGANIZED HEALTH CARE EDUCATION/TRAINING PROGRAM

## 2025-05-08 PROCEDURE — 77067 SCR MAMMO BI INCL CAD: CPT | Performed by: STUDENT IN AN ORGANIZED HEALTH CARE EDUCATION/TRAINING PROGRAM

## 2025-05-08 PROCEDURE — 71271 CT THORAX LUNG CANCER SCR C-: CPT

## 2025-05-23 ENCOUNTER — HOSPITAL ENCOUNTER (OUTPATIENT)
Dept: RADIOLOGY | Facility: HOSPITAL | Age: 70
Discharge: HOME | End: 2025-05-23
Payer: MEDICARE

## 2025-05-23 DIAGNOSIS — R92.8 ABNORMAL MAMMOGRAM: ICD-10-CM

## 2025-05-23 PROCEDURE — 76982 USE 1ST TARGET LESION: CPT

## 2025-05-23 PROCEDURE — 76642 ULTRASOUND BREAST LIMITED: CPT

## 2025-05-23 PROCEDURE — 77062 BREAST TOMOSYNTHESIS BI: CPT

## 2025-06-10 ENCOUNTER — APPOINTMENT (OUTPATIENT)
Facility: CLINIC | Age: 70
End: 2025-06-10
Payer: MEDICARE

## 2025-06-10 VITALS
DIASTOLIC BLOOD PRESSURE: 76 MMHG | BODY MASS INDEX: 26.22 KG/M2 | OXYGEN SATURATION: 98 % | SYSTOLIC BLOOD PRESSURE: 110 MMHG | HEIGHT: 63 IN | HEART RATE: 60 BPM | WEIGHT: 148 LBS

## 2025-06-10 DIAGNOSIS — Z71.6 ENCOUNTER FOR SMOKING CESSATION COUNSELING: ICD-10-CM

## 2025-06-10 DIAGNOSIS — K21.9 GASTROESOPHAGEAL REFLUX DISEASE WITHOUT ESOPHAGITIS: ICD-10-CM

## 2025-06-10 DIAGNOSIS — E03.9 HYPOTHYROIDISM, UNSPECIFIED TYPE: ICD-10-CM

## 2025-06-10 DIAGNOSIS — I10 PRIMARY HYPERTENSION: ICD-10-CM

## 2025-06-10 DIAGNOSIS — F17.218 CIGARETTE NICOTINE DEPENDENCE WITH OTHER NICOTINE-INDUCED DISORDER: Primary | ICD-10-CM

## 2025-06-10 DIAGNOSIS — M89.8X9 ABNORMAL BONE FORMATION: ICD-10-CM

## 2025-06-10 PROBLEM — K74.60 CIRRHOSIS (MULTI): Status: RESOLVED | Noted: 2023-04-23 | Resolved: 2025-06-10

## 2025-06-10 PROBLEM — F10.20 ALCOHOLIC (MULTI): Status: RESOLVED | Noted: 2023-04-23 | Resolved: 2025-06-10

## 2025-06-10 PROCEDURE — 3078F DIAST BP <80 MM HG: CPT

## 2025-06-10 PROCEDURE — 3074F SYST BP LT 130 MM HG: CPT

## 2025-06-10 PROCEDURE — 99214 OFFICE O/P EST MOD 30 MIN: CPT

## 2025-06-10 PROCEDURE — 3008F BODY MASS INDEX DOCD: CPT

## 2025-06-10 RX ORDER — PANTOPRAZOLE SODIUM 40 MG/1
40 TABLET, DELAYED RELEASE ORAL 2 TIMES DAILY
Qty: 180 TABLET | Refills: 1 | Status: SHIPPED | OUTPATIENT
Start: 2025-06-10

## 2025-06-10 RX ORDER — LISINOPRIL 20 MG/1
20 TABLET ORAL DAILY
Qty: 90 TABLET | Refills: 1 | Status: SHIPPED | OUTPATIENT
Start: 2025-06-10

## 2025-06-10 RX ORDER — BUPROPION HYDROCHLORIDE 150 MG/1
TABLET, EXTENDED RELEASE ORAL
Qty: 57 TABLET | Refills: 0 | Status: SHIPPED | OUTPATIENT
Start: 2025-06-10 | End: 2025-07-10

## 2025-06-10 RX ORDER — LEVOTHYROXINE SODIUM 100 UG/1
TABLET ORAL
Qty: 90 TABLET | Refills: 2 | Status: SHIPPED | OUTPATIENT
Start: 2025-06-10

## 2025-06-10 NOTE — PROGRESS NOTES
Subjective   Patient ID: Luz Castillo is a 70 y.o. female who presents for Follow-up.  Today she is accompanied by alone.     HPI  Patient here for medical management two month follow up.    Discussing Chantix and smoking cessation.  Has been taking chantix for 8-9 months now per patient  Is taking one tablet a day currently, as two a day makes her feel off, angry at others  Has a 53 pack year history.  Down to half a day now.  Has used zofran previously to help with Chantix induced nausea  Has been told to have precancerous cells in tongue  Did not get nicoderm patch as well, 14mg/24hr, due to cost  Would consider Wellbutrin, as she is experiencing medical hardship with her partner as he is in-and-out of the hospital recently.    Night sweats  - may have been due to chantix withdrawal previously  - are not as bad anymore    Acid reflux  - controlled by pantoprazole 40mg     Right clavicle concern  - patient has concern for increased size of mid-clavicle  - has no pain at site or skin changes    Needs lisinopril, synthroid, pantoprazole refills    Medications Ordered Prior to Encounter[1]     Allergies[2]    Immunization History   Administered Date(s) Administered    Hep A / Hep B 03/31/2010, 04/28/2010, 09/29/2010    Hepatitis B vaccine, adult *Check Product/Dose* 10/27/2022    Moderna COVID-19 vaccine, 12 years and older (50mcg/0.5mL)(Spikevax) 01/24/2024    Moderna COVID-19 vaccine, bivalent, blue cap/gray label *Check age/dose* 01/04/2023    Moderna SARS-CoV-2 Vaccination 05/14/2021, 06/11/2021, 01/03/2022, 08/04/2022    Pneumococcal conjugate vaccine, 20-valent (PREVNAR 20) 04/01/2025    Pneumococcal polysaccharide vaccine, 23-valent, age 2 years and older (PNEUMOVAX 23) 09/20/2021    Tdap vaccine, age 7 year and older (BOOSTRIX, ADACEL) 07/08/2012         Review of Systems  All pertinent positive symptoms are included in the history of present illness.  All other systems have been reviewed and are  "negative and noncontributory to this patient's current ailments.     Objective   /76   Pulse 60   Ht 1.6 m (5' 3\")   Wt 67.1 kg (148 lb)   SpO2 98%   BMI 26.22 kg/m²   BSA: 1.73 meters squared  No visits with results within 1 Month(s) from this visit.   Latest known visit with results is:   Office Visit on 04/01/2025   Component Date Value Ref Range Status    WHITE BLOOD CELL COUNT 04/03/2025 4.0  3.8 - 10.8 Thousand/uL Final    RED BLOOD CELL COUNT 04/03/2025 4.41  3.80 - 5.10 Million/uL Final    HEMOGLOBIN 04/03/2025 14.2  11.7 - 15.5 g/dL Final    HEMATOCRIT 04/03/2025 42.9  35.0 - 45.0 % Final    MCV 04/03/2025 97.3  80.0 - 100.0 fL Final    MCH 04/03/2025 32.2  27.0 - 33.0 pg Final    MCHC 04/03/2025 33.1  32.0 - 36.0 g/dL Final    Comment: For adults, a slight decrease in the calculated MCHC  value (in the range of 30 to 32 g/dL) is most likely  not clinically significant; however, it should be  interpreted with caution in correlation with other  red cell parameters and the patient's clinical  condition.      RDW 04/03/2025 12.0  11.0 - 15.0 % Final    PLATELET COUNT 04/03/2025 241  140 - 400 Thousand/uL Final    MPV 04/03/2025 10.5  7.5 - 12.5 fL Final    ABSOLUTE NEUTROPHILS 04/03/2025 1,760  1,500 - 7,800 cells/uL Final    ABSOLUTE LYMPHOCYTES 04/03/2025 1,588  850 - 3,900 cells/uL Final    ABSOLUTE MONOCYTES 04/03/2025 392  200 - 950 cells/uL Final    ABSOLUTE EOSINOPHILS 04/03/2025 220  15 - 500 cells/uL Final    ABSOLUTE BASOPHILS 04/03/2025 40  0 - 200 cells/uL Final    NEUTROPHILS 04/03/2025 44  % Final    LYMPHOCYTES 04/03/2025 39.7  % Final    MONOCYTES 04/03/2025 9.8  % Final    EOSINOPHILS 04/03/2025 5.5  % Final    BASOPHILS 04/03/2025 1.0  % Final    GLUCOSE 04/03/2025 96  65 - 99 mg/dL Final    Comment:               Fasting reference interval         UREA NITROGEN (BUN) 04/03/2025 13  7 - 25 mg/dL Final    CREATININE 04/03/2025 0.88  0.50 - 1.05 mg/dL Final    EGFR 04/03/2025 71  > " OR = 60 mL/min/1.73m2 Final    SODIUM 04/03/2025 140  135 - 146 mmol/L Final    POTASSIUM 04/03/2025 4.7  3.5 - 5.3 mmol/L Final    CHLORIDE 04/03/2025 105  98 - 110 mmol/L Final    CARBON DIOXIDE 04/03/2025 28  20 - 32 mmol/L Final    ELECTROLYTE BALANCE 04/03/2025 7  7 - 17 mmol/L (calc) Final    CALCIUM 04/03/2025 9.7  8.6 - 10.4 mg/dL Final    PROTEIN, TOTAL 04/03/2025 7.0  6.1 - 8.1 g/dL Final    ALBUMIN 04/03/2025 4.5  3.6 - 5.1 g/dL Final    BILIRUBIN, TOTAL 04/03/2025 0.4  0.2 - 1.2 mg/dL Final    ALKALINE PHOSPHATASE 04/03/2025 43  37 - 153 U/L Final    AST 04/03/2025 23  10 - 35 U/L Final    ALT 04/03/2025 24  6 - 29 U/L Final    TSH W/REFLEX TO FT4 04/03/2025 2.31  0.40 - 4.50 mIU/L Final    CHOLESTEROL, TOTAL 04/03/2025 191  <200 mg/dL Final    HDL CHOLESTEROL 04/03/2025 79  > OR = 50 mg/dL Final    TRIGLYCERIDES 04/03/2025 86  <150 mg/dL Final    LDL-CHOLESTEROL 04/03/2025 94  mg/dL (calc) Final    Comment: Reference range: <100     Desirable range <100 mg/dL for primary prevention;    <70 mg/dL for patients with CHD or diabetic patients   with > or = 2 CHD risk factors.     LDL-C is now calculated using the Jose-Monica   calculation, which is a validated novel method providing   better accuracy than the Friedewald equation in the   estimation of LDL-C.   Jose FERNÁNDEZ et al. NESTOR. 2013;310(19): 0457-2635   (http://education.BigEvidence.Snap Fitness/faq/NEX066)      CHOL/HDLC RATIO 04/03/2025 2.4  <5.0 (calc) Final    NON HDL CHOLESTEROL 04/03/2025 112  <130 mg/dL (calc) Final    Comment: For patients with diabetes plus 1 major ASCVD risk   factor, treating to a non-HDL-C goal of <100 mg/dL   (LDL-C of <70 mg/dL) is considered a therapeutic   option.      HEMOGLOBIN A1c 04/03/2025 5.7 (H)  <5.7 % of total Hgb Final    Comment: For someone without known diabetes, a hemoglobin   A1c value between 5.7% and 6.4% is consistent with  prediabetes and should be confirmed with a   follow-up test.     For someone with  known diabetes, a value <7%  indicates that their diabetes is well controlled. A1c  targets should be individualized based on duration of  diabetes, age, comorbid conditions, and other  considerations.     This assay result is consistent with an increased risk  of diabetes.     Currently, no consensus exists regarding use of  hemoglobin A1c for diagnosis of diabetes for children.         eAG (mg/dL) 04/03/2025 117  mg/dL Final    eAG (mmol/L) 04/03/2025 6.5  mmol/L Final       Physical Exam  CONSTITUTIONAL - well nourished, well developed, looks like stated age, in no acute distress, not ill-appearing, and not tired appearing  SKIN - normal skin color and pigmentation, normal skin turgor without rash, lesions, or nodules visualized  HEAD - no trauma, normocephalic  EYES - extraocular muscles are intact, and normal external exam  CHEST - clear to auscultation, no wheezing, no crackles and no rales, good effort, +carole prominence of right Sternoclavicular junction and bumps along mid-clavicle  CARDIAC - regular rate and regular rhythm, no skipped beats, no murmur  EXTREMITIES - no edema, no deformities  NEUROLOGICAL - normal gait, normal balance, normal motor, no ataxia; alert, oriented and no focal signs  PSYCHIATRIC - alert, pleasant and cordial, age-appropriate      Assessment/Plan   Diagnoses and all orders for this visit:  Encounter for smoking cessation counseling  Comments:  Starting Wellbutrin. Stop Chantix  Orders:  -     buPROPion SR (Wellbutrin SR) 150 mg 12 hr tablet; Take 1 tablet (150 mg) by mouth once daily in the morning for 3 days, THEN 1 tablet (150 mg) 2 times a day for 27 days. Do not crush, chew, or split.  Abnormal bone formation  Comments:  X-ray ordered  Orders:  -     XR clavicle right; Future  Hypothyroidism, unspecified type  Comments:  Well controlled. Refill sent  Orders:  -     levothyroxine (Synthroid, Levoxyl) 100 mcg tablet; take 1 tablet by mouth 6 days per week  Primary  hypertension  Comments:  Well controlled. Lisinopril refilled  Orders:  -     lisinopril 20 mg tablet; Take 1 tablet (20 mg) by mouth once daily.  Gastroesophageal reflux disease without esophagitis  Comments:  Well controlled on PPI. Refill sent  Orders:  -     pantoprazole (ProtoNix) 40 mg EC tablet; Take 1 tablet (40 mg) by mouth 2 times a day. Take 1 tab once in the morning and once at night. Do not crush, chew, or split.      Will stop chantix.  Start taking wellbutrin 150 mg once daily for 3 days, then twice daily for 27 days. Will follow up in one month before end of Rx    Will get clavicle x-ray    Discussed the plan of care with the patient and they provided their understanding. All questions dicussed and answered during visit.    Follow up in 1 month         [1]   Current Outpatient Medications on File Prior to Visit   Medication Sig Dispense Refill    acetaminophen (Tylenol) 500 mg tablet Take 1 tablet (500 mg) by mouth every 6 hours.      amoxicillin (Amoxil) 500 mg capsule Take 4 tablets one time for dental procedure 4 capsule 0    aspirin 81 mg EC tablet Take 1 tablet (81 mg) by mouth once daily.      atorvastatin (Lipitor) 20 mg tablet Take 1 tablet (20 mg) by mouth once daily. (Patient taking differently: Take 1 tablet (20 mg) by mouth once daily. Cuts in half) 90 tablet 1    b complex vitamins capsule Take 1 capsule by mouth once daily.      calcium carbonate 600 mg calcium (1,500 mg) tablet Take 1,200 mg by mouth once daily.      cholecalciferol (Vitamin D-3) 25 MCG (1000 UT) capsule Take 2 capsules (50 mcg) by mouth once daily.      folic acid (Folvite) 1 mg tablet Take 1 tablet (1 mg) by mouth once daily.      ibuprofen 800 mg tablet Take 1 tablet (800 mg) by mouth every 8 hours if needed for mild pain (1 - 3).      magnesium glycinate 100 mg magnesium capsule Take 1 capsule (100 mg) by mouth once daily.      multivit-min/ferrous gluconate (CENTAMIN ORAL) Take 1 capsule by mouth early in the  morning..      olopatadine (Pataday) 0.2 % ophthalmic solution Administer 1 drop into both eyes once daily.      omega 3-dha-epa-fish oil 360 mg-108 mg- 180 mg-1,200 mg capsule Take 1 capsule by mouth once daily.      TURMERIC ORAL Take 500 mg by mouth once daily.      varenicline tartrate (Chantix) 1 mg tablet Take 1 tablet (1 mg) by mouth 2 times a day. (Patient taking differently: Take 1 tablet (1 mg) by mouth 2 times a day. Only takes one per day) 90 tablet 1    [DISCONTINUED] levothyroxine (Synthroid, Levoxyl) 100 mcg tablet take 1 tablet by mouth 6 days per week 90 tablet 1    [DISCONTINUED] lisinopril 20 mg tablet Take 1 tablet (20 mg) by mouth once daily. 90 tablet 1    [DISCONTINUED] pantoprazole (ProtoNix) 40 mg EC tablet Take 1 tablet (40 mg) by mouth 2 times a day. Take 1 tab once in the morning and once at night. Do not crush, chew, or split. 90 tablet 1    HYDROcodone-acetaminophen (Norco) 5-325 mg tablet  (Patient not taking: Reported on 6/10/2025)      nicotine (Nicoderm CQ) 14 mg/24 hr patch Place 1 patch over 24 hours on the skin once every 24 hours. (Patient not taking: Reported on 6/10/2025) 56 patch 0    ondansetron (Zofran) 4 mg tablet TAKE ONE (1) TABLET BY MOUTH EVERY 8 HOURS IF NEEDED FOR NAUSEA OR VOMITING (Patient not taking: Reported on 6/10/2025) 40 tablet 0     No current facility-administered medications on file prior to visit.   [2]   Allergies  Allergen Reactions    Celecoxib Unknown     Comments: heart races    Diphenhydramine Unknown    Iodine Itching and Swelling    Lipitor [Atorvastatin] Hives and Unknown     Pt. Is allergic to specifically Lipitor    Pramipexole Unknown    Rofecoxib Other     tacjycardia

## 2025-06-13 ENCOUNTER — HOSPITAL ENCOUNTER (OUTPATIENT)
Dept: RADIOLOGY | Facility: HOSPITAL | Age: 70
Discharge: HOME | End: 2025-06-13
Payer: MEDICARE

## 2025-06-13 ENCOUNTER — TELEPHONE (OUTPATIENT)
Facility: CLINIC | Age: 70
End: 2025-06-13
Payer: MEDICARE

## 2025-06-13 DIAGNOSIS — M89.8X9 ABNORMAL BONE FORMATION: ICD-10-CM

## 2025-06-13 PROCEDURE — 73000 X-RAY EXAM OF COLLAR BONE: CPT | Mod: RT

## 2025-06-13 NOTE — TELEPHONE ENCOUNTER
Patient called having diarrhea and upset stomach.  Spoke with Dr emery patient instructed to hydrate and try bland diet.   Told to follow up on Monday if no improvement.  If things worsens over the weekend to go to the ER.

## 2025-06-16 ENCOUNTER — TELEMEDICINE (OUTPATIENT)
Facility: CLINIC | Age: 70
End: 2025-06-16
Payer: MEDICARE

## 2025-06-16 DIAGNOSIS — K59.04 CHRONIC IDIOPATHIC CONSTIPATION: ICD-10-CM

## 2025-06-16 DIAGNOSIS — R19.7 DIARRHEA OF PRESUMED INFECTIOUS ORIGIN: Primary | ICD-10-CM

## 2025-06-16 PROCEDURE — 99214 OFFICE O/P EST MOD 30 MIN: CPT | Performed by: STUDENT IN AN ORGANIZED HEALTH CARE EDUCATION/TRAINING PROGRAM

## 2025-06-16 NOTE — PROGRESS NOTES
Subjective   Luz Castillo is a 70 y.o. female who presents for GI concerns      Virtual or Telephone Consent    While technically available, the patient was unable or unwilling to consent to connect via audio/video telehealth technology; therefore, I performed this visit using a real-time audio only connection between Luz Castillo & Ariana Flor DO.  Verbal consent was requested and obtained from Luz Castillo on this date, 06/16/25 for a telehealth visit and the patient's location was confirmed at the time of the visit.      HPI  Patient presents today due to concerns for diarrhea and stomach upset   Symptoms started 5 days ago   Reports intermittent abdominal pressure cramps and feels she has to run to the bathroom, sometimes loose stool or sometimes just gas   This happens 15+ times per day   Today she went 10 times between 5 am and 10 am  Has felt better the last few hours   Typically struggles with constipation, likely underlying IBS-C  Had been significantly constipated prior to symptoms onset, no BM for a couple days  Hx of hemorrhoids, has seen a few small blood spots on tissue paper and notes irritation       ROS:  All pertinent positive symptoms are included in the history of present illness.  All other systems have been reviewed and are negative and noncontributory to this patient's current ailments.    Objective     There were no vitals taken for this visit.  CONSTITUTIONAL - NAD.  RESP - No labored breathing.  PSYCH - Appropriate mood and behavior       Assessment/Plan   Diagnoses and all orders for this visit:  Diarrhea of presumed infectious origin  Comments:  Likely viral gastroenteritis vs overflow diarrhea from chronic constipation  Plan for [facility] for further evaluation   Stool studies order to be obtained if symptoms persist or worsen over the next couple days    Discussed use of fiber   Will monitor symptoms and proceed to the ED if develops persistent/severe pain, significant  bleeding, or signs of dehydration  Orders:  -     C. difficile, PCR; Future  -     Ova/Para + Giardia/Cryptosporidium Antigen; Future  -     Stool Pathogen Panel, PCR; Future  Chronic idiopathic constipation  -     XR abdomen 1 view; Future    Will notify of test results once available and make recommendations accordingly. Instructed to contact the office if symptoms fail to improve or worsen.       Ariana Arboleda, DO

## 2025-06-17 ENCOUNTER — HOSPITAL ENCOUNTER (OUTPATIENT)
Dept: RADIOLOGY | Facility: HOSPITAL | Age: 70
Discharge: HOME | End: 2025-06-17
Payer: MEDICARE

## 2025-06-17 DIAGNOSIS — K59.04 CHRONIC IDIOPATHIC CONSTIPATION: ICD-10-CM

## 2025-06-17 PROCEDURE — 74018 RADEX ABDOMEN 1 VIEW: CPT

## 2025-06-17 PROCEDURE — 74018 RADEX ABDOMEN 1 VIEW: CPT | Performed by: RADIOLOGY

## 2025-06-19 ENCOUNTER — TELEPHONE (OUTPATIENT)
Facility: CLINIC | Age: 70
End: 2025-06-19
Payer: MEDICARE

## 2025-06-19 NOTE — TELEPHONE ENCOUNTER
Patient wants to know if the bupropine 150 mg would cause the lightheadedness  started getting light headed then

## 2025-06-19 NOTE — TELEPHONE ENCOUNTER
Patient is calling because she has diarrhea shaky lightheaded headache and sweats.   Patient wants to know what she should do

## 2025-06-20 LAB
C COLI+JEJUNI+LARI FUSA STL QL NAA+PROBE: NOT DETECTED
C DIFF TOX GENS STL QL NAA+PROBE: NORMAL
CRYPTOSP AG STL QL IA: NORMAL
EC STX1 GENE STL QL NAA+PROBE: NOT DETECTED
EC STX2 GENE STL QL NAA+PROBE: NOT DETECTED
G LAMBLIA AG STL QL IA: NORMAL
NOROV GI+II ORF1-ORF2 JNC STL QL NAA+PR: NOT DETECTED
O+P STL CONC: NORMAL
O+P STL TRI STN: NORMAL
RVA NSP5 STL QL NAA+PROBE: NOT DETECTED
SALMONELLA SP RPOD STL QL NAA+PROBE: NOT DETECTED
SHIGELLA DNA SPEC QL NAA+PROBE: NOT DETECTED
V CHOL+PARA RFBL+TRKH+TNAA STL QL NAA+PR: NOT DETECTED
Y ENTERO RECN STL QL NAA+PROBE: NOT DETECTED

## 2025-06-23 ENCOUNTER — APPOINTMENT (OUTPATIENT)
Dept: RADIOLOGY | Facility: HOSPITAL | Age: 70
End: 2025-06-23
Payer: MEDICARE

## 2025-06-23 ENCOUNTER — HOSPITAL ENCOUNTER (EMERGENCY)
Facility: HOSPITAL | Age: 70
Discharge: HOME | End: 2025-06-23
Attending: EMERGENCY MEDICINE
Payer: MEDICARE

## 2025-06-23 ENCOUNTER — TELEPHONE (OUTPATIENT)
Facility: CLINIC | Age: 70
End: 2025-06-23
Payer: MEDICARE

## 2025-06-23 VITALS
HEART RATE: 78 BPM | RESPIRATION RATE: 16 BRPM | BODY MASS INDEX: 25.69 KG/M2 | OXYGEN SATURATION: 99 % | TEMPERATURE: 97.9 F | WEIGHT: 145 LBS | DIASTOLIC BLOOD PRESSURE: 78 MMHG | SYSTOLIC BLOOD PRESSURE: 139 MMHG | HEIGHT: 63 IN

## 2025-06-23 DIAGNOSIS — M25.559 HIP PAIN, UNSPECIFIED LATERALITY: Primary | ICD-10-CM

## 2025-06-23 LAB
ALBUMIN SERPL BCP-MCNC: 4 G/DL (ref 3.4–5)
ALP SERPL-CCNC: 39 U/L (ref 33–136)
ALT SERPL W P-5'-P-CCNC: 22 U/L (ref 7–45)
ANION GAP SERPL CALC-SCNC: 12 MMOL/L (ref 10–20)
APPEARANCE UR: CLEAR
AST SERPL W P-5'-P-CCNC: 19 U/L (ref 9–39)
BASOPHILS # BLD AUTO: 0.04 X10*3/UL (ref 0–0.1)
BASOPHILS NFR BLD AUTO: 0.7 %
BILIRUB SERPL-MCNC: 0.3 MG/DL (ref 0–1.2)
BILIRUB UR STRIP.AUTO-MCNC: NEGATIVE MG/DL
BUN SERPL-MCNC: 14 MG/DL (ref 6–23)
CALCIUM SERPL-MCNC: 9 MG/DL (ref 8.6–10.3)
CHLORIDE SERPL-SCNC: 107 MMOL/L (ref 98–107)
CO2 SERPL-SCNC: 24 MMOL/L (ref 21–32)
COLOR UR: COLORLESS
CREAT SERPL-MCNC: 0.87 MG/DL (ref 0.5–1.05)
EGFRCR SERPLBLD CKD-EPI 2021: 72 ML/MIN/1.73M*2
EOSINOPHIL # BLD AUTO: 0.28 X10*3/UL (ref 0–0.7)
EOSINOPHIL NFR BLD AUTO: 5.2 %
ERYTHROCYTE [DISTWIDTH] IN BLOOD BY AUTOMATED COUNT: 13.2 % (ref 11.5–14.5)
GLUCOSE SERPL-MCNC: 104 MG/DL (ref 74–99)
GLUCOSE UR STRIP.AUTO-MCNC: NORMAL MG/DL
HCT VFR BLD AUTO: 38.7 % (ref 36–46)
HGB BLD-MCNC: 13.1 G/DL (ref 12–16)
HOLD SPECIMEN: NORMAL
IMM GRANULOCYTES # BLD AUTO: 0.02 X10*3/UL (ref 0–0.7)
IMM GRANULOCYTES NFR BLD AUTO: 0.4 % (ref 0–0.9)
KETONES UR STRIP.AUTO-MCNC: NEGATIVE MG/DL
LACTATE SERPL-SCNC: 1.1 MMOL/L (ref 0.4–2)
LEUKOCYTE ESTERASE UR QL STRIP.AUTO: NEGATIVE
LYMPHOCYTES # BLD AUTO: 1.54 X10*3/UL (ref 1.2–4.8)
LYMPHOCYTES NFR BLD AUTO: 28.8 %
MAGNESIUM SERPL-MCNC: 2 MG/DL (ref 1.6–2.4)
MCH RBC QN AUTO: 32.3 PG (ref 26–34)
MCHC RBC AUTO-ENTMCNC: 33.9 G/DL (ref 32–36)
MCV RBC AUTO: 96 FL (ref 80–100)
MONOCYTES # BLD AUTO: 0.51 X10*3/UL (ref 0.1–1)
MONOCYTES NFR BLD AUTO: 9.6 %
NEUTROPHILS # BLD AUTO: 2.95 X10*3/UL (ref 1.2–7.7)
NEUTROPHILS NFR BLD AUTO: 55.3 %
NITRITE UR QL STRIP.AUTO: NEGATIVE
NRBC BLD-RTO: 0 /100 WBCS (ref 0–0)
PH UR STRIP.AUTO: 6 [PH]
PLATELET # BLD AUTO: 222 X10*3/UL (ref 150–450)
POTASSIUM SERPL-SCNC: 4 MMOL/L (ref 3.5–5.3)
PROT SERPL-MCNC: 6.9 G/DL (ref 6.4–8.2)
PROT UR STRIP.AUTO-MCNC: NEGATIVE MG/DL
RBC # BLD AUTO: 4.05 X10*6/UL (ref 4–5.2)
RBC # UR STRIP.AUTO: NEGATIVE MG/DL
SODIUM SERPL-SCNC: 139 MMOL/L (ref 136–145)
SP GR UR STRIP.AUTO: 1
UROBILINOGEN UR STRIP.AUTO-MCNC: NORMAL MG/DL
WBC # BLD AUTO: 5.3 X10*3/UL (ref 4.4–11.3)

## 2025-06-23 PROCEDURE — 80053 COMPREHEN METABOLIC PANEL: CPT | Performed by: EMERGENCY MEDICINE

## 2025-06-23 PROCEDURE — 96375 TX/PRO/DX INJ NEW DRUG ADDON: CPT

## 2025-06-23 PROCEDURE — 83605 ASSAY OF LACTIC ACID: CPT | Performed by: EMERGENCY MEDICINE

## 2025-06-23 PROCEDURE — 2500000004 HC RX 250 GENERAL PHARMACY W/ HCPCS (ALT 636 FOR OP/ED): Performed by: EMERGENCY MEDICINE

## 2025-06-23 PROCEDURE — 83735 ASSAY OF MAGNESIUM: CPT | Performed by: EMERGENCY MEDICINE

## 2025-06-23 PROCEDURE — 36415 COLL VENOUS BLD VENIPUNCTURE: CPT | Performed by: EMERGENCY MEDICINE

## 2025-06-23 PROCEDURE — 74176 CT ABD & PELVIS W/O CONTRAST: CPT | Performed by: STUDENT IN AN ORGANIZED HEALTH CARE EDUCATION/TRAINING PROGRAM

## 2025-06-23 PROCEDURE — 96361 HYDRATE IV INFUSION ADD-ON: CPT

## 2025-06-23 PROCEDURE — 72131 CT LUMBAR SPINE W/O DYE: CPT | Performed by: STUDENT IN AN ORGANIZED HEALTH CARE EDUCATION/TRAINING PROGRAM

## 2025-06-23 PROCEDURE — 85025 COMPLETE CBC W/AUTO DIFF WBC: CPT | Performed by: EMERGENCY MEDICINE

## 2025-06-23 PROCEDURE — 74176 CT ABD & PELVIS W/O CONTRAST: CPT

## 2025-06-23 PROCEDURE — 96374 THER/PROPH/DIAG INJ IV PUSH: CPT

## 2025-06-23 PROCEDURE — 81003 URINALYSIS AUTO W/O SCOPE: CPT | Performed by: EMERGENCY MEDICINE

## 2025-06-23 PROCEDURE — 99284 EMERGENCY DEPT VISIT MOD MDM: CPT | Mod: 25 | Performed by: EMERGENCY MEDICINE

## 2025-06-23 PROCEDURE — 2500000001 HC RX 250 WO HCPCS SELF ADMINISTERED DRUGS (ALT 637 FOR MEDICARE OP): Performed by: STUDENT IN AN ORGANIZED HEALTH CARE EDUCATION/TRAINING PROGRAM

## 2025-06-23 RX ORDER — IBUPROFEN 400 MG/1
800 TABLET, FILM COATED ORAL ONCE
Status: COMPLETED | OUTPATIENT
Start: 2025-06-23 | End: 2025-06-23

## 2025-06-23 RX ORDER — HYDROCODONE BITARTRATE AND ACETAMINOPHEN 5; 325 MG/1; MG/1
1 TABLET ORAL EVERY 6 HOURS PRN
Qty: 6 TABLET | Refills: 0 | Status: SHIPPED | OUTPATIENT
Start: 2025-06-23 | End: 2025-06-26

## 2025-06-23 RX ORDER — MORPHINE SULFATE 4 MG/ML
4 INJECTION INTRAVENOUS ONCE
Status: COMPLETED | OUTPATIENT
Start: 2025-06-23 | End: 2025-06-23

## 2025-06-23 RX ORDER — ONDANSETRON HYDROCHLORIDE 2 MG/ML
4 INJECTION, SOLUTION INTRAVENOUS ONCE
Status: COMPLETED | OUTPATIENT
Start: 2025-06-23 | End: 2025-06-23

## 2025-06-23 RX ADMIN — MORPHINE SULFATE 4 MG: 4 INJECTION INTRAVENOUS at 05:04

## 2025-06-23 RX ADMIN — IBUPROFEN 800 MG: 400 TABLET, FILM COATED ORAL at 06:52

## 2025-06-23 RX ADMIN — SODIUM CHLORIDE 1000 ML: 0.9 INJECTION, SOLUTION INTRAVENOUS at 05:07

## 2025-06-23 RX ADMIN — ONDANSETRON 4 MG: 2 INJECTION, SOLUTION INTRAMUSCULAR; INTRAVENOUS at 05:04

## 2025-06-23 ASSESSMENT — PAIN - FUNCTIONAL ASSESSMENT: PAIN_FUNCTIONAL_ASSESSMENT: 0-10

## 2025-06-23 ASSESSMENT — PAIN DESCRIPTION - ORIENTATION: ORIENTATION: RIGHT

## 2025-06-23 ASSESSMENT — PAIN DESCRIPTION - PROGRESSION: CLINICAL_PROGRESSION: NOT CHANGED

## 2025-06-23 ASSESSMENT — LIFESTYLE VARIABLES
EVER HAD A DRINK FIRST THING IN THE MORNING TO STEADY YOUR NERVES TO GET RID OF A HANGOVER: NO
HAVE PEOPLE ANNOYED YOU BY CRITICIZING YOUR DRINKING: NO
TOTAL SCORE: 0
HAVE YOU EVER FELT YOU SHOULD CUT DOWN ON YOUR DRINKING: NO
EVER FELT BAD OR GUILTY ABOUT YOUR DRINKING: NO

## 2025-06-23 ASSESSMENT — PAIN DESCRIPTION - PAIN TYPE: TYPE: ACUTE PAIN

## 2025-06-23 ASSESSMENT — PAIN SCALES - GENERAL
PAINLEVEL_OUTOF10: 7
PAINLEVEL_OUTOF10: 4

## 2025-06-23 ASSESSMENT — PAIN DESCRIPTION - LOCATION: LOCATION: HIP

## 2025-06-23 NOTE — ED PROVIDER NOTES
HPI   Chief Complaint   Patient presents with    Hip Pain     Right hip pain started last night before bed and woke her up out of sleep, had surgery on the right hip in the past        70-year-old female here for chief complaint of low back pain right hip pain and possible bowel obstruction.  Patient states her right hip has been hurting her for about a week now and it just keeps getting worse and worse.  Finally she woke up due to the pain.  She is also worried about a bowel obstruction because she has not had a normal bowel movement in 10 days and she does take stool softeners.  She is having some diarrhea so she is worried she has an obstruction in the stool is possibly going around the obstruction.  She states the pain starts in her low back area and radiates around into the right hip.  She denies any tingling numbness or weakness down the extremity.  She was able to ambulate up until this morning.  No trauma to the area.  No nausea or vomiting.              Patient History   Medical History[1]  Surgical History[2]  Family History[3]  Social History[4]    Physical Exam   ED Triage Vitals [06/23/25 0437]   Temperature Heart Rate Respirations BP   36.6 °C (97.9 °F) 58 18 146/65      Pulse Ox Temp Source Heart Rate Source Patient Position   99 % Temporal Monitor Lying      BP Location FiO2 (%)     Right arm --       Physical Exam  Vitals and nursing note reviewed.   Constitutional:       Appearance: Normal appearance.   HENT:      Head: Normocephalic and atraumatic.      Nose: Nose normal.      Mouth/Throat:      Mouth: Mucous membranes are moist.   Eyes:      Extraocular Movements: Extraocular movements intact.      Pupils: Pupils are equal, round, and reactive to light.   Cardiovascular:      Rate and Rhythm: Normal rate and regular rhythm.   Pulmonary:      Effort: Pulmonary effort is normal.      Breath sounds: Normal breath sounds.   Abdominal:      General: Abdomen is flat.      Palpations: Abdomen is soft.    Musculoskeletal:         General: Tenderness present.      Cervical back: Normal range of motion.      Comments: There is tenderness to palpations paraspinally in the right lumbar region proximately L4, positive straight leg raising test on the right at 45 degrees.  She is able to flex the left hip without difficulty.  Normal strength and sensation 2+ pulses bilaterally   Skin:     General: Skin is warm and dry.      Capillary Refill: Capillary refill takes less than 2 seconds.   Neurological:      General: No focal deficit present.      Mental Status: She is alert.   Psychiatric:         Mood and Affect: Mood normal.           ED Course & MDM   Diagnoses as of 06/26/25 0848   Hip pain, unspecified laterality                 No data recorded     [place] Coma Scale Score: 15 (06/23/25 0453 : Saman Demarco, RN)                           Medical Decision Making  Medical Decision Making: Patient is currently being worked up and will be signed out to the oncoming physician for further disposition.  Currently awaiting imaging studies and lab work.  @[email or web address] [place]@     Cat Covington D.O.  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    Chronic viral hepatitis C (Multi) 11/29/2021    Chronic viral hepatitis C    Hepatitis C     Osteopenia     Other long term (current) drug therapy 05/06/2021    High risk medication use    Personal history of diseases of the blood and blood-forming organs and certain disorders involving the immune mechanism     History of autoimmune disorder    Personal history of other diseases of the circulatory system     History  of hypertension    Personal history of other diseases of the digestive system 09/20/2021    History of gastroesophageal reflux (GERD)    Personal history of other diseases of the female genital tract     History of endometriosis    Personal history of other endocrine, nutritional and metabolic disease 03/10/2021    History of goiter    Personal history of other infectious and parasitic diseases 03/17/2022    History of hepatitis C virus infection   [2]   Past Surgical History:  Procedure Laterality Date    CATARACT EXTRACTION W/  INTRAOCULAR LENS IMPLANT Bilateral     OD 25.5 D, Dr Toussaint    MR HEAD ANGIO WO IV CONTRAST  01/12/2023    MR HEAD ANGIO WO IV CONTRAST 1/12/2023 [facility]    MR HEAD ANGIO WO IV CONTRAST  04/19/2018    MR HEAD ANGIO WO IV CONTRAST [facility]    MR NECK ANGIO WO IV CONTRAST  01/12/2023    MR NECK ANGIO WO IV CONTRAST 1/12/2023 [facility]    OTHER SURGICAL HISTORY  11/11/2021    Appendectomy    OTHER SURGICAL HISTORY  11/11/2021    Hernia repair    OTHER SURGICAL HISTORY  11/11/2021    Laparoscopy    OTHER SURGICAL HISTORY  11/11/2021    Shoulder replacement    OTHER SURGICAL HISTORY  11/11/2021    Shoulder arthroscopy    OTHER SURGICAL HISTORY  11/11/2021    Tubal ligation    OTHER SURGICAL HISTORY  11/11/2021    Colonic polypectomy    OTHER SURGICAL HISTORY  11/11/2021    Colonoscopy   [3]   Family History  Problem Relation Name Age of Onset    Heart disease Father      Diabetes Father     [4]   Social History  Tobacco Use    Smoking status: Every Day     Current packs/day: 1.00     Types: Cigarettes    Smokeless tobacco: Never   Vaping Use    Vaping status: Never Used   Substance Use Topics    Alcohol use: Never    Drug use: Never        Cat Covington DO  06/26/25 0848

## 2025-06-23 NOTE — TELEPHONE ENCOUNTER
PA started for gasto lab testing with [facility]. No further assistance needed from me at this time per [facility].

## 2025-06-24 ENCOUNTER — APPOINTMENT (OUTPATIENT)
Facility: CLINIC | Age: 70
End: 2025-06-24
Payer: MEDICARE

## 2025-06-24 VITALS
BODY MASS INDEX: 26.22 KG/M2 | DIASTOLIC BLOOD PRESSURE: 74 MMHG | WEIGHT: 148 LBS | HEART RATE: 59 BPM | HEIGHT: 63 IN | OXYGEN SATURATION: 97 % | SYSTOLIC BLOOD PRESSURE: 114 MMHG

## 2025-06-24 DIAGNOSIS — R19.7 DIARRHEA, UNSPECIFIED TYPE: ICD-10-CM

## 2025-06-24 DIAGNOSIS — F17.210 CIGARETTE NICOTINE DEPENDENCE WITHOUT COMPLICATION: ICD-10-CM

## 2025-06-24 DIAGNOSIS — K59.04 CHRONIC IDIOPATHIC CONSTIPATION: Primary | ICD-10-CM

## 2025-06-24 PROCEDURE — 3078F DIAST BP <80 MM HG: CPT

## 2025-06-24 PROCEDURE — 1159F MED LIST DOCD IN RCRD: CPT

## 2025-06-24 PROCEDURE — 99214 OFFICE O/P EST MOD 30 MIN: CPT

## 2025-06-24 PROCEDURE — 3008F BODY MASS INDEX DOCD: CPT

## 2025-06-24 PROCEDURE — 3074F SYST BP LT 130 MM HG: CPT

## 2025-06-24 NOTE — PROGRESS NOTES
Subjective   Patient ID: Luz Castillo is a 70 y.o. female who presents for Follow-up.  Today she is accompanied by alone.     HPI  Here for one month follow up and to review recent loose stools.    Stopped wellbutrin due to some shakiness around 6/19/25. Is at a half pack per day of smoking currently.  The shakiness and sweating has resolved after stopping the meds.    Currently stool symptoms are better. Does not feel like anything is blocking her up anymore. Did have a period prior which the stools were very loose and felt blocked.   Now has stools that have some shape but are very soft.     Morning - three colace  Lunch - if not gone yet, drinks prune juice with one cap of miralax, which she will stool soon after  Remainder of day - no more medications    Having a more formed stool every day for the past 7 days now. Having about 3-4 movements in the afternoons after the miralax. Doesn't feel backed up anymore. CT abdomen 6/23/25 showed moderate stool burden. No blood with stools.    Drinks at least 50 ounces of water daily    Is due for repeat colonoscopy in October because of liver fibrosis, GI recommendation, and polyp history.    Medications Ordered Prior to Encounter[1]     Allergies[2]    Immunization History   Administered Date(s) Administered    Hep A / Hep B 03/31/2010, 04/28/2010, 09/29/2010    Hepatitis B vaccine, adult *Check Product/Dose* 10/27/2022    Moderna COVID-19 vaccine, 12 years and older (50mcg/0.5mL)(Spikevax) 01/24/2024    Moderna COVID-19 vaccine, bivalent, blue cap/gray label *Check age/dose* 01/04/2023    Moderna SARS-CoV-2 Vaccination 05/14/2021, 06/11/2021, 01/03/2022, 08/04/2022    Pneumococcal conjugate vaccine, 20-valent (PREVNAR 20) 04/01/2025    Pneumococcal polysaccharide vaccine, 23-valent, age 2 years and older (PNEUMOVAX 23) 09/20/2021    Tdap vaccine, age 7 year and older (BOOSTRIX, ADACEL) 07/08/2012         Review of Systems  All pertinent positive symptoms are  "included in the history of present illness.  All other systems have been reviewed and are negative and noncontributory to this patient's current ailments.     Objective   /74   Pulse 59   Ht 1.6 m (5' 3\")   Wt 67.1 kg (148 lb)   SpO2 97%   BMI 26.22 kg/m²   BSA: 1.73 meters squared  Admission on 06/23/2025, Discharged on 06/23/2025   Component Date Value Ref Range Status    WBC 06/23/2025 5.3  4.4 - 11.3 x10*3/uL Final    nRBC 06/23/2025 0.0  0.0 - 0.0 /100 WBCs Final    RBC 06/23/2025 4.05  4.00 - 5.20 x10*6/uL Final    Hemoglobin 06/23/2025 13.1  12.0 - 16.0 g/dL Final    Hematocrit 06/23/2025 38.7  36.0 - 46.0 % Final    MCV 06/23/2025 96  80 - 100 fL Final    MCH 06/23/2025 32.3  26.0 - 34.0 pg Final    MCHC 06/23/2025 33.9  32.0 - 36.0 g/dL Final    RDW 06/23/2025 13.2  11.5 - 14.5 % Final    Platelets 06/23/2025 222  150 - 450 x10*3/uL Final    Neutrophils % 06/23/2025 55.3  40.0 - 80.0 % Final    Immature Granulocytes %, Automated 06/23/2025 0.4  0.0 - 0.9 % Final    Immature Granulocyte Count (IG) includes promyelocytes, myelocytes and metamyelocytes but does not include bands. Percent differential counts (%) should be interpreted in the context of the absolute cell counts (cells/UL).    Lymphocytes % 06/23/2025 28.8  13.0 - 44.0 % Final    Monocytes % 06/23/2025 9.6  2.0 - 10.0 % Final    Eosinophils % 06/23/2025 5.2  0.0 - 6.0 % Final    Basophils % 06/23/2025 0.7  0.0 - 2.0 % Final    Neutrophils Absolute 06/23/2025 2.95  1.20 - 7.70 x10*3/uL Final    Percent differential counts (%) should be interpreted in the context of the absolute cell counts (cells/uL).    Immature Granulocytes Absolute, Au* 06/23/2025 0.02  0.00 - 0.70 x10*3/uL Final    Lymphocytes Absolute 06/23/2025 1.54  1.20 - 4.80 x10*3/uL Final    Monocytes Absolute 06/23/2025 0.51  0.10 - 1.00 x10*3/uL Final    Eosinophils Absolute 06/23/2025 0.28  0.00 - 0.70 x10*3/uL Final    Basophils Absolute 06/23/2025 0.04  0.00 - 0.10 " x10*3/uL Final    Magnesium 06/23/2025 2.00  1.60 - 2.40 mg/dL Final    Glucose 06/23/2025 104 (H)  74 - 99 mg/dL Final    Sodium 06/23/2025 139  136 - 145 mmol/L Final    Potassium 06/23/2025 4.0  3.5 - 5.3 mmol/L Final    Chloride 06/23/2025 107  98 - 107 mmol/L Final    Bicarbonate 06/23/2025 24  21 - 32 mmol/L Final    Anion Gap 06/23/2025 12  10 - 20 mmol/L Final    Urea Nitrogen 06/23/2025 14  6 - 23 mg/dL Final    Creatinine 06/23/2025 0.87  0.50 - 1.05 mg/dL Final    eGFR 06/23/2025 72  >60 mL/min/1.73m*2 Final    Calculations of estimated GFR are performed using the 2021 CKD-EPI Study Refit equation without the race variable for the IDMS-Traceable creatinine methods.  https://jasn.asnjournals.org/content/early/2021/09/22/ASN.2835161153    Calcium 06/23/2025 9.0  8.6 - 10.3 mg/dL Final    Albumin 06/23/2025 4.0  3.4 - 5.0 g/dL Final    Alkaline Phosphatase 06/23/2025 39  33 - 136 U/L Final    Total Protein 06/23/2025 6.9  6.4 - 8.2 g/dL Final    AST 06/23/2025 19  9 - 39 U/L Final    Bilirubin, Total 06/23/2025 0.3  0.0 - 1.2 mg/dL Final    ALT 06/23/2025 22  7 - 45 U/L Final    Patients treated with Sulfasalazine may generate falsely decreased results for ALT.    Lactate 06/23/2025 1.1  0.4 - 2.0 mmol/L Final    Color, Urine 06/23/2025 Colorless (N)  Light-Yellow, Yellow, Dark-Yellow Final    Appearance, Urine 06/23/2025 Clear  Clear Final    Specific Gravity, Urine 06/23/2025 1.004 (N)  1.005 - 1.035 Final    pH, Urine 06/23/2025 6.0  5.0, 5.5, 6.0, 6.5, 7.0, 7.5, 8.0 Final    Protein, Urine 06/23/2025 NEGATIVE  NEGATIVE, 10 (TRACE), 20 (TRACE) mg/dL Final    Glucose, Urine 06/23/2025 Normal  Normal mg/dL Final    Blood, Urine 06/23/2025 NEGATIVE  NEGATIVE mg/dL Final    Ketones, Urine 06/23/2025 NEGATIVE  NEGATIVE mg/dL Final    Bilirubin, Urine 06/23/2025 NEGATIVE  NEGATIVE mg/dL Final    Urobilinogen, Urine 06/23/2025 Normal  Normal mg/dL Final    Nitrite, Urine 06/23/2025 NEGATIVE  NEGATIVE Final     Leukocyte Esterase, Urine 06/23/2025 NEGATIVE  NEGATIVE Final   Telemedicine on 06/16/2025   Component Date Value Ref Range Status    CAMPYLOBACTER GROUP 06/19/2025 NOT DETECTED  NOT DETECTED Final    SALMONELLA SPECIES 06/19/2025 NOT DETECTED  NOT DETECTED Final    SHIGELLA SPECIES 06/19/2025 NOT DETECTED  NOT DETECTED Final    VIBRIO GROUP 06/19/2025 NOT DETECTED  NOT DETECTED Final    YERSINIA ENTEROCOLITICA 06/19/2025 NOT DETECTED  NOT DETECTED Final    SHIGA TOXIN 1 06/19/2025 NOT DETECTED  NOT DETECTED Final    SHIGA TOXIN 2 06/19/2025 NOT DETECTED  NOT DETECTED Final    NOROVIRUS GI/GII 06/19/2025 NOT DETECTED  NOT DETECTED Final    ROTAVIRUS A 06/19/2025 NOT DETECTED  NOT DETECTED Final    Comment: Organisms included in the Campylobacter group include  C. coli, C. jejuni, and C. dewayne. Organisms included in  the Shigella species include S. dysenteriae, S. boydii,  S. sonnei, and S. flexneri. Organisms included in the  Vibrio group include V. cholerae and  V. parahaemolyticus.      CRYPTOSPORIDIUM ANTIGEN, EIA 06/19/2025 SEE NOTE   Final    Comment:     CRYPTOSPORIDIUM ANTIGEN, EIA         Micro Number:      [id number]    Test Status:       Final    Specimen Source:   Stool    Specimen Quality:  Adequate    Cryptosporidium:   Not Detected    Reference Range:   Not Detected                            NOTE: Due to intermittent shedding, one negative                       sample does not necessarily rule out the presence                       of a parasitic infection.      GIARDIA AG, EIA, STOOL 06/19/2025 SEE NOTE   Final    Comment:     GIARDIA AG, EIA, STOOL         Micro Number:      [id number]    Test Status:       Final    Specimen Source:   Stool    Specimen Quality:  Adequate    Giardia Result 1:  Not Detected    Reference Range:   Not Detected                            NOTE: Due to intermittent shedding, one negative                       sample does not necessarily rule out the presence                        of a parasitic infection.      OVA AND PARASITES, CONC AND PERM S* 06/19/2025 SEE NOTE   Preliminary    CONCENTRATE 06/19/2025 SEE NOTE   Preliminary       Physical Exam  CONSTITUTIONAL - well nourished, well developed, looks like stated age, in no acute distress, not ill-appearing, and not tired appearing  SKIN - normal skin color and pigmentation, normal skin turgor without rash, lesions, or nodules visualized  HEAD - no trauma, normocephalic  EYES - extraocular muscles are intact, and normal external exam  CHEST - clear to auscultation, no wheezing, no crackles and no rales, good effort  CARDIAC - regular rate and regular rhythm, no skipped beats, no murmur  ABDOMEN - no organomegaly, soft, nontender, nondistended, normal bowel sounds, no guarding/rebound/rigidity  EXTREMITIES - no edema, no deformities  NEUROLOGICAL - normal gait, normal balance, normal motor, no ataxia; alert, oriented and no focal signs  PSYCHIATRIC - alert, pleasant and cordial, age-appropriate      Assessment/Plan   Problem List Items Addressed This Visit           ICD-10-CM    Nicotine dependence F17.200     Other Visit Diagnoses         Codes      Chronic idiopathic constipation    -  Primary K59.04      Diarrhea, unspecified type     R19.7            Use one cap miralax with prune juice every morning.   Stop the colace daily usage.   Can use an additional half cap of miralax in the afternoon if no stooling by mid-day. Can also use colace as needed in the evenings if constipation arises.    Discussed further tobacco cessation methods such as lazenges, gum, and patches. Patient will continue with self-weaning at this time.    Discussed the plan of care with the patient and they provided their understanding. All questions dicussed and answered during visit.    Please return in 6 months to review chronic medical conditions.         [1]   Current Outpatient Medications on File Prior to Visit   Medication Sig Dispense Refill    acetaminophen  (Tylenol) 500 mg tablet Take 1 tablet (500 mg) by mouth every 6 hours.      amoxicillin (Amoxil) 500 mg capsule Take 4 tablets one time for dental procedure 4 capsule 0    aspirin 81 mg EC tablet Take 1 tablet (81 mg) by mouth once daily.      atorvastatin (Lipitor) 20 mg tablet Take 1 tablet (20 mg) by mouth once daily. 90 tablet 1    b complex vitamins capsule Take 1 capsule by mouth once daily.      calcium carbonate 600 mg calcium (1,500 mg) tablet Take 1,200 mg by mouth once daily.      cholecalciferol (Vitamin D-3) 25 MCG (1000 UT) capsule Take 2 capsules (50 mcg) by mouth once daily.      folic acid (Folvite) 1 mg tablet Take 1 tablet (1 mg) by mouth once daily.      HYDROcodone-acetaminophen (Norco) 5-325 mg tablet Take 1 tablet by mouth every 6 hours if needed for severe pain (7 - 10) for up to 3 days. 6 tablet 0    ibuprofen 800 mg tablet Take 1 tablet (800 mg) by mouth every 8 hours if needed for mild pain (1 - 3).      levothyroxine (Synthroid, Levoxyl) 100 mcg tablet take 1 tablet by mouth 6 days per week 90 tablet 2    lisinopril 20 mg tablet Take 1 tablet (20 mg) by mouth once daily. 90 tablet 1    magnesium glycinate 100 mg magnesium capsule Take 1 capsule (100 mg) by mouth once daily.      multivit-min/ferrous gluconate (CENTAMIN ORAL) Take 1 capsule by mouth early in the morning..      nicotine (Nicoderm CQ) 14 mg/24 hr patch Place 1 patch over 24 hours on the skin once every 24 hours. 56 patch 0    olopatadine (Pataday) 0.2 % ophthalmic solution Administer 1 drop into both eyes once daily.      omega 3-dha-epa-fish oil 360 mg-108 mg- 180 mg-1,200 mg capsule Take 1 capsule by mouth once daily.      pantoprazole (ProtoNix) 40 mg EC tablet Take 1 tablet (40 mg) by mouth 2 times a day. Take 1 tab once in the morning and once at night. Do not crush, chew, or split. 180 tablet 1    TURMERIC ORAL Take 500 mg by mouth once daily.      buPROPion SR (Wellbutrin SR) 150 mg 12 hr tablet Take 1 tablet (150 mg)  by mouth once daily in the morning for 3 days, THEN 1 tablet (150 mg) 2 times a day for 27 days. Do not crush, chew, or split. (Patient not taking: Reported on 6/24/2025) 57 tablet 0    HYDROcodone-acetaminophen (Norco) 5-325 mg tablet  (Patient not taking: Reported on 6/10/2025)      ondansetron (Zofran) 4 mg tablet TAKE ONE (1) TABLET BY MOUTH EVERY 8 HOURS IF NEEDED FOR NAUSEA OR VOMITING (Patient not taking: Reported on 6/10/2025) 40 tablet 0    varenicline tartrate (Chantix) 1 mg tablet Take 1 tablet (1 mg) by mouth 2 times a day. (Patient not taking: Reported on 6/24/2025) 90 tablet 1     No current facility-administered medications on file prior to visit.   [2]   Allergies  Allergen Reactions    Celecoxib Unknown     Comments: heart races    Diphenhydramine Unknown    Iodine Itching and Swelling    Lipitor [Atorvastatin] Hives and Unknown     Pt. Is allergic to specifically Lipitor    Pramipexole Unknown    Rofecoxib Other     tacjycardia

## 2025-06-28 LAB
C COLI+JEJUNI+LARI FUSA STL QL NAA+PROBE: NOT DETECTED
C DIFF TOX GENS STL QL NAA+PROBE: NOT DETECTED
CRYPTOSP AG STL QL IA: NORMAL
EC STX1 GENE STL QL NAA+PROBE: NOT DETECTED
EC STX2 GENE STL QL NAA+PROBE: NOT DETECTED
G LAMBLIA AG STL QL IA: NORMAL
NOROV GI+II ORF1-ORF2 JNC STL QL NAA+PR: NOT DETECTED
O+P STL TRI STN: NORMAL
RVA NSP5 STL QL NAA+PROBE: NOT DETECTED
SALMONELLA SP RPOD STL QL NAA+PROBE: NOT DETECTED
SHIGELLA DNA SPEC QL NAA+PROBE: NOT DETECTED
V CHOL+PARA RFBL+TRKH+TNAA STL QL NAA+PR: NOT DETECTED
Y ENTERO RECN STL QL NAA+PROBE: NOT DETECTED

## 2025-06-30 NOTE — PROGRESS NOTES
Patient was signed out to me at change of shift by the previous ED team.  Please see previous provider's note for complete history and physical exam.    Briefly, this is a 70-year-old female, presenting to the emergency department for worsening right hip pain which began 2 days ago and increased in severity last night.  Patient states that she is having significant amount of difficulty with ambulation secondary to right hip pain.  She does have a right hip replacement, and states that she mowed her entire lawn with a push mower yesterday, and may have overdone it.  She is also concerned about a possible bowel obstruction, as she has not had a normal bowel movement in 10 days.  Prior to signout, the patient had lab work which was overall grossly unremarkable.  Imaging was pending.  On reevaluation, the patient states that she is still having significant pain in the right hip, however was able to stand with help.  CT abdomen pelvis is not concerning for bowel obstruction.  CT lumbar spine is nonconcerning for any acute fractures or dislocations, and does show evidence of hip replacement on the right.  I do feel that the patient's pain is likely related to chronic pain in the hip exacerbated by all of her activity yesterday.  I do feel that she is safe and stable for discharge home at this time, however did offer admission for pain control and possible placement to a rehab facility for her increased difficulty with walking.  Patient declines at this time and prefers to follow-up with her primary care provider.  She was discharged home in stable condition.      Josette Bella, DO  Emergency Medicine

## 2025-07-10 ENCOUNTER — APPOINTMENT (OUTPATIENT)
Facility: CLINIC | Age: 70
End: 2025-07-10
Payer: MEDICARE

## 2025-07-10 ENCOUNTER — APPOINTMENT (OUTPATIENT)
Dept: OTOLARYNGOLOGY | Facility: CLINIC | Age: 70
End: 2025-07-10
Payer: MEDICARE

## 2025-07-10 VITALS — HEIGHT: 63 IN | BODY MASS INDEX: 26.05 KG/M2 | WEIGHT: 147 LBS

## 2025-07-10 DIAGNOSIS — K13.21 LEUKOPLAKIA, TONGUE: ICD-10-CM

## 2025-07-10 DIAGNOSIS — K14.9 TONGUE DYSPLASIA: Primary | ICD-10-CM

## 2025-07-10 DIAGNOSIS — F17.200 SMOKER: ICD-10-CM

## 2025-07-10 PROCEDURE — 1160F RVW MEDS BY RX/DR IN RCRD: CPT | Performed by: OTOLARYNGOLOGY

## 2025-07-10 PROCEDURE — 3008F BODY MASS INDEX DOCD: CPT | Performed by: OTOLARYNGOLOGY

## 2025-07-10 PROCEDURE — 99214 OFFICE O/P EST MOD 30 MIN: CPT | Performed by: OTOLARYNGOLOGY

## 2025-07-10 PROCEDURE — 1159F MED LIST DOCD IN RCRD: CPT | Performed by: OTOLARYNGOLOGY

## 2025-07-10 RX ORDER — GABAPENTIN 300 MG/1
300 CAPSULE ORAL NIGHTLY
COMMUNITY
Start: 2025-07-01

## 2025-07-10 NOTE — PROGRESS NOTES
Chief Complaint   Patient presents with    Follow-up     LOV: 3/2025 TONGUE DYSPLASIA & LEUKOPLAKIA      HPI:  Luz Castillo is a 70 y.o. female who presents in follow-up today for continued evaluation and a 4-month check of chronic left lateral tongue lesion initially seen and biopsied in May 2022 and  July 10, 2024.  This showed severe dysplasia.  She is elected observation over surgical removal.  She is aware the risks of this approach.  She reports no change. No sore throat, hoarseness, dysphagia, neck pain, neck mass.  Continues to do well without any symptoms.  She is doing well without any pain. Only occasionally she will notice it when of some hard food touches it or pokes into it.    Continues to smoke about a half a pack of cigarettes a day.  Unable to tolerate any medication assistance.  Having significant problems at home related to caring for her ill boyfriend who has prostate cancer and is in need of hyperbaric oxygen therapy after radiation therapy.  Bx 5/2022 showed some chronic inflammation and mild atypia. No obvious dysplasia or malignancy. Biopsy per report was adequate but superficial and malignancy cannot be entirely ruled out.        She is a smoker.  Down to half a pack.  October 2024 had hip replacement surgery.  --  7/10/24 Path:  FINAL DIAGNOSIS   Tongue, left ventrolateral, biopsy:  - Severe dysplasia with verrucous hyperkeratosis, see note.     Note: Severe dysplasia involves both tissue fragments.       5/2022 Path:  TONGUE, LEFT LATERAL, EXCISION:        CHRONICALLY INFLAMED SQUAMOUS MUCOSA SHOWING PROLIFERATIVE CHANGES             WITH MILD ATYPIA,      HYPERKERATOSIS AND PARAKERATOSIS, SEE   COMMENT.     Comment: The excised tissue is relatively superficial and the lesion is   partially transected at the deep aspect and the base of the lesion is   not entirely represented. A neoplastic process is not excluded. Given   the clinical findings of mass, the current biopsy may not be    representative of the pathologic process. Clinical correlation is   recommended.     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    Chronic viral hepatitis C (Multi) 11/29/2021    Chronic viral hepatitis C    Hepatitis C     Osteopenia     Other long term (current) drug therapy 05/06/2021    High risk medication use    Personal history of diseases of the blood and blood-forming organs and certain disorders involving the immune mechanism     History of autoimmune disorder    Personal history of other diseases of the circulatory system     History of hypertension    Personal history of other diseases of the digestive system 09/20/2021    History of gastroesophageal reflux (GERD)    Personal history of other diseases of the female genital tract     History of endometriosis    Personal history of other endocrine, nutritional and metabolic disease 03/10/2021    History of goiter    Personal history of other infectious and parasitic diseases 03/17/2022    History of hepatitis C virus infection     Past Surgical History:   Procedure Laterality Date    CATARACT EXTRACTION W/  INTRAOCULAR LENS IMPLANT Bilateral     OD 25.5 D, Dr Toussaint    MR HEAD ANGIO WO IV CONTRAST  01/12/2023    MR HEAD ANGIO WO IV CONTRAST 1/12/2023 [facility]    MR HEAD ANGIO WO IV CONTRAST  04/19/2018    MR HEAD ANGIO WO IV CONTRAST [facility]    MR NECK ANGIO WO IV CONTRAST  01/12/2023    MR NECK ANGIO WO IV CONTRAST 1/12/2023 [facility]    OTHER SURGICAL HISTORY  11/11/2021    Appendectomy    OTHER SURGICAL HISTORY  11/11/2021    Hernia repair    OTHER SURGICAL HISTORY  11/11/2021    Laparoscopy    OTHER  SURGICAL HISTORY  11/11/2021    Shoulder replacement    OTHER SURGICAL HISTORY  11/11/2021    Shoulder arthroscopy    OTHER SURGICAL HISTORY  11/11/2021    Tubal ligation    OTHER SURGICAL HISTORY  11/11/2021    Colonic polypectomy    OTHER SURGICAL HISTORY  11/11/2021    Colonoscopy         Medications:     Current Outpatient Medications:     gabapentin (Neurontin) 300 mg capsule, Take 1 capsule (300 mg) by mouth once daily at bedtime., Disp: , Rfl:     acetaminophen (Tylenol) 500 mg tablet, Take 1 tablet (500 mg) by mouth every 6 hours., Disp: , Rfl:     amoxicillin (Amoxil) 500 mg capsule, Take 4 tablets one time for dental procedure, Disp: 4 capsule, Rfl: 0    aspirin 81 mg EC tablet, Take 1 tablet (81 mg) by mouth once daily., Disp: , Rfl:     atorvastatin (Lipitor) 20 mg tablet, Take 1 tablet (20 mg) by mouth once daily., Disp: 90 tablet, Rfl: 1    b complex vitamins capsule, Take 1 capsule by mouth once daily., Disp: , Rfl:     buPROPion SR (Wellbutrin SR) 150 mg 12 hr tablet, Take 1 tablet (150 mg) by mouth once daily in the morning for 3 days, THEN 1 tablet (150 mg) 2 times a day for 27 days. Do not crush, chew, or split. (Patient not taking: Reported on 7/10/2025), Disp: 57 tablet, Rfl: 0    calcium carbonate 600 mg calcium (1,500 mg) tablet, Take 1,200 mg by mouth once daily., Disp: , Rfl:     cholecalciferol (Vitamin D-3) 25 MCG (1000 UT) capsule, Take 2 capsules (50 mcg) by mouth once daily., Disp: , Rfl:     folic acid (Folvite) 1 mg tablet, Take 1 tablet (1 mg) by mouth once daily., Disp: , Rfl:     HYDROcodone-acetaminophen (Norco) 5-325 mg tablet, , Disp: , Rfl:     ibuprofen 800 mg tablet, Take 1 tablet (800 mg) by mouth every 8 hours if needed for mild pain (1 - 3)., Disp: , Rfl:     levothyroxine (Synthroid, Levoxyl) 100 mcg tablet, take 1 tablet by mouth 6 days per week, Disp: 90 tablet, Rfl: 2    lisinopril 20 mg tablet, Take 1 tablet (20 mg) by mouth once daily., Disp: 90 tablet, Rfl: 1     "magnesium glycinate 100 mg magnesium capsule, Take 1 capsule (100 mg) by mouth once daily., Disp: , Rfl:     multivit-min/ferrous gluconate (CENTAMIN ORAL), Take 1 capsule by mouth early in the morning.., Disp: , Rfl:     nicotine (Nicoderm CQ) 14 mg/24 hr patch, Place 1 patch over 24 hours on the skin once every 24 hours., Disp: 56 patch, Rfl: 0    olopatadine (Pataday) 0.2 % ophthalmic solution, Administer 1 drop into both eyes once daily., Disp: , Rfl:     omega 3-dha-epa-fish oil 360 mg-108 mg- 180 mg-1,200 mg capsule, Take 1 capsule by mouth once daily., Disp: , Rfl:     ondansetron (Zofran) 4 mg tablet, TAKE ONE (1) TABLET BY MOUTH EVERY 8 HOURS IF NEEDED FOR NAUSEA OR VOMITING (Patient not taking: Reported on 6/10/2025), Disp: 40 tablet, Rfl: 0    pantoprazole (ProtoNix) 40 mg EC tablet, Take 1 tablet (40 mg) by mouth 2 times a day. Take 1 tab once in the morning and once at night. Do not crush, chew, or split., Disp: 180 tablet, Rfl: 1    TURMERIC ORAL, Take 500 mg by mouth once daily., Disp: , Rfl:     varenicline tartrate (Chantix) 1 mg tablet, Take 1 tablet (1 mg) by mouth 2 times a day. (Patient not taking: Reported on 7/10/2025), Disp: 90 tablet, Rfl: 1     Allergies:  Allergies   Allergen Reactions    Celecoxib Unknown     Comments: heart races    Diphenhydramine Unknown    Iodine Itching and Swelling    Lipitor [Atorvastatin] Hives and Unknown     Pt. Is allergic to specifically Lipitor    Pramipexole Unknown    Rofecoxib Other     tacjycardia        ROS:  Review of systems normal unless stated otherwise in the HPI and/or PMH.    Physical Exam:  Height 1.6 m (5' 3\"), weight 66.7 kg (147 lb). Body mass index is 26.04 kg/m².     GENERAL APPEARANCE: Well developed and well nourished.  Alert and oriented in no acute distress.  Normal vocal quality.      HEAD/FACE: No erythema or edema or facial tenderness.  Normal facial nerve function bilaterally.    EAR:       EXTERNAL: Normal pinnas and external " auditory canals without lesion or obstructing wax.       MIDDLE EAR: Tympanic membranes intact and mobile with normal landmarks.  Middle ear space appears well aerated.       TUBE STATUS: N/A       MASTOID CAVITY: N/A       HEARING: Gross hearing assessment is within normal limits.      NOSE:       VISUALIZED USING: Anterior rhinoscopy with headlight and nasal speculum.       DORSUM: Midline, nontraumatic appearance.       MUCOSA: Normal-appearing.       SECRETIONS: Normal.       SEPTUM: Midline and nonobstructing.       INFERIOR TURBINATES: Normal.       MIDDLE TURBINATES/MEATUS: N/A       BLEEDING: N/A         ORAL CAVITY/PHARYNX:       TEETH: Adequate dentition.       TONGUE: No change in the approximate 2 x 4 cm left lateral tongue plaque-like area of leukoplakia.  Without any palpable depth.  No ulceration.  No tenderness.  Normal mobility.  No change.        FLOOR OF MOUTH: No mass or lesion.       PALATE: Normal hard palate, soft palate, and uvula.       OROPHARYNX: Normal without mass or lesion.       BUCCAL MUCOSA/GBS: Normal without mass or lesion.       LIPS: Normal.    LARYNX/HYPOPHARYNX/NASOPHARYNX: N/A    NECK: No palpable masses or abnormal adenopathy.  Trachea is midline.    THYROID: No thyromegaly or palpable nodule.    SALIVARY GLANDS: Normal bilateral parotid and submandibular glands by inspection and palpation.    TMJ's: Normal.    NEURO: Cranial nerve exam grossly normal bilaterally.       Assessment/Plan   Luz was seen today for follow-up.  Diagnoses and all orders for this visit:  Tongue dysplasia (Primary)  -     Referral to ENT; Future  Leukoplakia, tongue  Smoker    Provide continue education support about the diagnosis of the dysplasia and that this is a precancerous lesion as have done in the past.  This certainly can develop into malignancy in the future and cause some significant problems.  We did go over options of watchful waiting with close clinical observation versus surgical  removal versus second opinion from my head and neck colleagues at [facility].    She is now with a mindset that she like to consider further intervention.  Still unsure if she would want anything surgical.  She is in agreement with second opinion and evaluation and treatment from my surgical oncology colleagues at [facility]  I will see her again in 4 months or sooner with any changes such as but not limited to pain, ulceration, neck mass.    Follow up in about 4 months (around 11/10/2025).     Toy Gallardo MD

## 2025-07-15 ENCOUNTER — OFFICE VISIT (OUTPATIENT)
Facility: CLINIC | Age: 70
End: 2025-07-15
Payer: MEDICARE

## 2025-07-15 VITALS
SYSTOLIC BLOOD PRESSURE: 118 MMHG | BODY MASS INDEX: 24.49 KG/M2 | OXYGEN SATURATION: 98 % | HEIGHT: 65 IN | DIASTOLIC BLOOD PRESSURE: 76 MMHG | WEIGHT: 147 LBS | HEART RATE: 73 BPM

## 2025-07-15 DIAGNOSIS — T14.8XXA FOREIGN BODY IN SKIN: Primary | ICD-10-CM

## 2025-07-15 NOTE — PROGRESS NOTES
Family Medicine Clinic Note    Today's Date: 7/18/2025       HPI: Luz Castillo is a 70 y.o. female presents today with chief complaint of possible tick bite.    -Pt noted a black dot on her right arm last night, which she believes may be a tick  -She has dogs, who she lets outside, and also sleeps with her dogs in bed.   -Pt states that she recently mowed the lawn and there has been long grass in the dog's pen. She was wearing long pants when mowing the lawn  -Denies pain to the right arm except when rubbing the area with the black dot. No erythema or warmth. No pruritus.   -Denies recent hiking, camping, trekking, outdoor recreation activities. No recent travel.   -No trauma, falls, MVA.   -Denies fevers, chills, lightheadedness, dizziness, headache, chest pain, SOB, myalgias, arthralgias, swelling, skin erythema or edema, cough, wheezing, confusion.     Review of Systems: 10 point review of systems negative and noncontributory unless listed in HPI above.     Laly Arreguin, OMS-3    Past Medical History:  Medical History[1]     Past Surgical History:   Surgical History[2]     Family History:  Family History[3]     Social History:  Social History     Socioeconomic History    Marital status: Single     Spouse name: Not on file    Number of children: Not on file    Years of education: Not on file    Highest education level: Not on file   Occupational History    Not on file   Tobacco Use    Smoking status: Every Day     Current packs/day: 1.00     Types: Cigarettes    Smokeless tobacco: Never   Vaping Use    Vaping status: Never Used   Substance and Sexual Activity    Alcohol use: Never    Drug use: Never    Sexual activity: Not on file   Other Topics Concern    Not on file   Social History Narrative    Not on file     Social Drivers of Health     Financial Resource Strain: Low Risk  (12/27/2024)    Overall Financial Resource Strain (CARDIA)     Difficulty of Paying Living Expenses: Not hard at all   Food  "Insecurity: Not on file   Transportation Needs: No Transportation Needs (12/27/2024)    PRAPARE - Transportation     Lack of Transportation (Medical): No     Lack of Transportation (Non-Medical): No   Physical Activity: Not on file   Stress: Not on file   Social Connections: Feeling Socially Integrated (11/1/2024)    OASIS : Social Isolation     Frequency of experiencing loneliness or isolation: Never   Intimate Partner Violence: Not on file   Housing Stability: Low Risk  (12/27/2024)    Housing Stability Vital Sign     Unable to Pay for Housing in the Last Year: No     Number of Times Moved in the Last Year: 0     Homeless in the Last Year: No       Current Medications:   Current Medications[4]    Allergies:   Allergies[5]     Objective:  Vitals: Blood pressure 118/76, pulse 73, height 1.651 m (5' 5\"), weight 66.7 kg (147 lb), SpO2 98%.  Body mass index is 24.46 kg/m².     Physical Exam:  GENERAL: Alert, oriented, well nourished, age-appropriate, pleasant, in no acute distress.   HEENT: Head normocephalic, atraumatic. EOMI. Mucous membranes moist.   CARDIOVASCULAR: Heart with regular rate and rhythm, normal S1/S2, no murmurs; normal peripheral pulses- radial and dorsalis pedis palpable.  LUNGS: Clear to auscultation bilaterally without wheezing, rhonchi or rales; good respiratory effort, no increased work of breathing.  EXTREMITIES: No deformities, cyanosis, or edema bilaterally in upper and lower extremities.   NEURO: Alert and oriented to person, place, and time. No focal deficits.   PSYCH: Appropriate mood and affect.  SKIN: Pinpoint black krish noted on the right upper extremity, with no surrounding warmth or erythema. No bleeding or drainage from the area. No ticks noted.     Assessment/Plan   Diagnoses and all orders for this visit:  Foreign body in skin  -No tick bites in right upper extremity on physical exam  -In clinic, we successfully removed the foreign body which was a piece of small gravel. Pt " tolerated well with no bleeding.   -No signs of infection on physical exam, so we will not prescribe antibiotics at this time.   -Precautions provided. If you develop skin color changes, warmth, erythema, fevers, chills, then please call our office for further management.     All questions answered. Patient understands and agrees to the plan. Patient discussed with attending, Dr. Arboleda.    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    Chronic viral hepatitis C (Multi) 11/29/2021    Chronic viral hepatitis C    Hepatitis C     Osteopenia     Other long term (current) drug therapy 05/06/2021    High risk medication use    Personal history of diseases of the blood and blood-forming organs and certain disorders involving the immune mechanism     History of autoimmune disorder    Personal history of other diseases of the circulatory system     History of hypertension    Personal history of other diseases of the digestive system 09/20/2021    History of gastroesophageal reflux (GERD)    Personal history of other diseases of the female genital tract     History of endometriosis    Personal history of other endocrine, nutritional and metabolic disease 03/10/2021    History of goiter    Personal history of other infectious and parasitic diseases 03/17/2022    History of hepatitis C virus infection   [2]   Past Surgical History:  Procedure Laterality Date    CATARACT EXTRACTION W/  INTRAOCULAR LENS IMPLANT Bilateral     OD 25.5 D, Dr Toussaint    MR HEAD ANGIO WO IV CONTRAST  01/12/2023    MR HEAD ANGIO WO IV CONTRAST  1/12/2023 [facility]    MR HEAD ANGIO WO IV CONTRAST  04/19/2018    MR HEAD ANGIO WO IV CONTRAST [facility]    MR NECK ANGIO WO IV CONTRAST  01/12/2023    MR NECK ANGIO WO IV CONTRAST 1/12/2023 [facility]    OTHER SURGICAL HISTORY  11/11/2021    Appendectomy    OTHER SURGICAL HISTORY  11/11/2021    Hernia repair    OTHER SURGICAL HISTORY  11/11/2021    Laparoscopy    OTHER SURGICAL HISTORY  11/11/2021    Shoulder replacement    OTHER SURGICAL HISTORY  11/11/2021    Shoulder arthroscopy    OTHER SURGICAL HISTORY  11/11/2021    Tubal ligation    OTHER SURGICAL HISTORY  11/11/2021    Colonic polypectomy    OTHER SURGICAL HISTORY  11/11/2021    Colonoscopy   [3]   Family History  Problem Relation Name Age of Onset    Heart disease Father      Diabetes Father     [4]   Current Outpatient Medications:     acetaminophen (Tylenol) 500 mg tablet, Take 1 tablet (500 mg) by mouth every 6 hours., Disp: , Rfl:     amoxicillin (Amoxil) 500 mg capsule, Take 4 tablets one time for dental procedure, Disp: 4 capsule, Rfl: 0    aspirin 81 mg EC tablet, Take 1 tablet (81 mg) by mouth once daily., Disp: , Rfl:     atorvastatin (Lipitor) 20 mg tablet, Take 1 tablet (20 mg) by mouth once daily., Disp: 90 tablet, Rfl: 1    b complex vitamins capsule, Take 1 capsule by mouth once daily., Disp: , Rfl:     calcium carbonate 600 mg calcium (1,500 mg) tablet, Take 1,200 mg by mouth once daily., Disp: , Rfl:     cholecalciferol (Vitamin D-3) 25 MCG (1000 UT) capsule, Take 2 capsules (50 mcg) by mouth once daily., Disp: , Rfl:     folic acid (Folvite) 1 mg tablet, Take 1 tablet (1 mg) by mouth once daily., Disp: , Rfl:     gabapentin (Neurontin) 300 mg capsule, Take 1 capsule (300 mg) by mouth once daily at bedtime., Disp: , Rfl:     ibuprofen 800 mg tablet, Take 1 tablet (800 mg) by mouth every 8 hours if needed for mild pain (1 - 3)., Disp: , Rfl:     levothyroxine (Synthroid, Levoxyl) 100 mcg tablet, take 1 tablet  by mouth 6 days per week, Disp: 90 tablet, Rfl: 2    lisinopril 20 mg tablet, Take 1 tablet (20 mg) by mouth once daily., Disp: 90 tablet, Rfl: 1    magnesium glycinate 100 mg magnesium capsule, Take 1 capsule (100 mg) by mouth once daily., Disp: , Rfl:     multivit-min/ferrous gluconate (CENTAMIN ORAL), Take 1 capsule by mouth early in the morning.., Disp: , Rfl:     olopatadine (Pataday) 0.2 % ophthalmic solution, Administer 1 drop into both eyes once daily., Disp: , Rfl:     omega 3-dha-epa-fish oil 360 mg-108 mg- 180 mg-1,200 mg capsule, Take 1 capsule by mouth once daily., Disp: , Rfl:     pantoprazole (ProtoNix) 40 mg EC tablet, Take 1 tablet (40 mg) by mouth 2 times a day. Take 1 tab once in the morning and once at night. Do not crush, chew, or split., Disp: 180 tablet, Rfl: 1    TURMERIC ORAL, Take 500 mg by mouth once daily., Disp: , Rfl:     buPROPion SR (Wellbutrin SR) 150 mg 12 hr tablet, Take 1 tablet (150 mg) by mouth once daily in the morning for 3 days, THEN 1 tablet (150 mg) 2 times a day for 27 days. Do not crush, chew, or split. (Patient not taking: Reported on 7/10/2025), Disp: 57 tablet, Rfl: 0    HYDROcodone-acetaminophen (Norco) 5-325 mg tablet, , Disp: , Rfl:     nicotine (Nicoderm CQ) 14 mg/24 hr patch, Place 1 patch over 24 hours on the skin once every 24 hours. (Patient not taking: Reported on 7/15/2025), Disp: 56 patch, Rfl: 0    ondansetron (Zofran) 4 mg tablet, TAKE ONE (1) TABLET BY MOUTH EVERY 8 HOURS IF NEEDED FOR NAUSEA OR VOMITING (Patient not taking: Reported on 6/10/2025), Disp: 40 tablet, Rfl: 0    varenicline tartrate (Chantix) 1 mg tablet, Take 1 tablet (1 mg) by mouth 2 times a day. (Patient not taking: Reported on 7/10/2025), Disp: 90 tablet, Rfl: 1  [5]   Allergies  Allergen Reactions    Celecoxib Unknown     Comments: heart races    Diphenhydramine Unknown    Iodine Itching and Swelling    Lipitor [Atorvastatin] Hives and Unknown     Pt. Is allergic to specifically Lipitor     Pramipexole Unknown    Rofecoxib Other     tacjycardia

## 2025-08-04 ENCOUNTER — TELEPHONE (OUTPATIENT)
Dept: GASTROENTEROLOGY | Facility: HOSPITAL | Age: 70
End: 2025-08-04
Payer: MEDICARE

## 2025-08-04 NOTE — TELEPHONE ENCOUNTER
Patient called stating her gasteroenterologist advised that Dr. Isabel will need to put in a request for a follow-up colonoscopy to be performed 3 years prior to previous procedure versus 5 years.    Dr. Humberto Swift MD    [phone] [address]

## 2025-09-03 ENCOUNTER — APPOINTMENT (OUTPATIENT)
Dept: PRIMARY CARE | Facility: CLINIC | Age: 70
End: 2025-09-03
Payer: MEDICARE

## 2025-09-15 ENCOUNTER — APPOINTMENT (OUTPATIENT)
Dept: OTOLARYNGOLOGY | Facility: CLINIC | Age: 70
End: 2025-09-15
Payer: MEDICARE

## 2025-10-30 ENCOUNTER — APPOINTMENT (OUTPATIENT)
Dept: RADIOLOGY | Facility: HOSPITAL | Age: 70
End: 2025-10-30
Payer: MEDICARE

## 2025-12-02 ENCOUNTER — APPOINTMENT (OUTPATIENT)
Facility: CLINIC | Age: 70
End: 2025-12-02
Payer: MEDICARE

## 2026-01-05 ENCOUNTER — APPOINTMENT (OUTPATIENT)
Dept: PRIMARY CARE | Facility: CLINIC | Age: 71
End: 2026-01-05
Payer: MEDICARE

## 2026-04-22 ENCOUNTER — APPOINTMENT (OUTPATIENT)
Dept: OPHTHALMOLOGY | Facility: CLINIC | Age: 71
End: 2026-04-22
Payer: MEDICARE